# Patient Record
Sex: FEMALE | Race: WHITE | Employment: FULL TIME | URBAN - METROPOLITAN AREA
[De-identification: names, ages, dates, MRNs, and addresses within clinical notes are randomized per-mention and may not be internally consistent; named-entity substitution may affect disease eponyms.]

---

## 2017-02-02 PROBLEM — O99.112 THROMBOPHILIA AFFECTING PREGNANCY IN SECOND TRIMESTER, ANTEPARTUM (HCC): Status: ACTIVE | Noted: 2017-02-02

## 2017-02-02 PROBLEM — D68.59 THROMBOPHILIA AFFECTING PREGNANCY IN SECOND TRIMESTER, ANTEPARTUM (HCC): Status: ACTIVE | Noted: 2017-02-02

## 2017-03-14 PROBLEM — O12.13 PROTEINURIA AFFECTING PREGNANCY IN THIRD TRIMESTER: Status: ACTIVE | Noted: 2017-03-14

## 2017-03-28 PROBLEM — O36.8390 VARIABLE FETAL HEART RATE DECELERATIONS, ANTEPARTUM: Status: ACTIVE | Noted: 2017-03-28

## 2017-03-28 PROBLEM — O28.8 NON-REACTIVE NST (NON-STRESS TEST): Status: ACTIVE | Noted: 2017-03-28

## 2020-05-21 ENCOUNTER — INITIAL PRENATAL (OUTPATIENT)
Dept: OBGYN | Age: 30
End: 2020-05-21
Payer: COMMERCIAL

## 2020-05-21 ENCOUNTER — HOSPITAL ENCOUNTER (OUTPATIENT)
Age: 30
Discharge: HOME | End: 2020-05-23
Payer: COMMERCIAL

## 2020-05-21 VITALS
BODY MASS INDEX: 33.99 KG/M2 | HEART RATE: 121 BPM | DIASTOLIC BLOOD PRESSURE: 74 MMHG | WEIGHT: 198 LBS | SYSTOLIC BLOOD PRESSURE: 130 MMHG

## 2020-05-21 LAB
AMPHETAMINE SCREEN, URINE: NOT DETECTED
BARBITURATE SCREEN URINE: NOT DETECTED
BENZODIAZEPINE SCREEN, URINE: NOT DETECTED
CANNABINOID SCREEN URINE: NOT DETECTED
COCAINE METABOLITE SCREEN URINE: NOT DETECTED
CONTROL: NORMAL
FENTANYL SCREEN, URINE: NOT DETECTED
GLUCOSE TOLERANCE SCREEN 50G: 149 MG/DL (ref 70–140)
HCT VFR BLD CALC: 38.6 % (ref 34–48)
HEMOGLOBIN: 12.5 G/DL (ref 11.5–15.5)
Lab: NORMAL
MCH RBC QN AUTO: 28.3 PG (ref 26–35)
MCHC RBC AUTO-ENTMCNC: 32.4 % (ref 32–34.5)
MCV RBC AUTO: 87.5 FL (ref 80–99.9)
METHADONE SCREEN, URINE: NOT DETECTED
OPIATE SCREEN URINE: NOT DETECTED
OXYCODONE URINE: NOT DETECTED
PDW BLD-RTO: 13.1 FL (ref 11.5–15)
PHENCYCLIDINE SCREEN URINE: NOT DETECTED
PLATELET # BLD: 279 E9/L (ref 130–450)
PMV BLD AUTO: 10.3 FL (ref 7–12)
PREGNANCY TEST URINE, POC: POSITIVE
RBC # BLD: 4.41 E12/L (ref 3.5–5.5)
WBC # BLD: 6.2 E9/L (ref 4.5–11.5)

## 2020-05-21 PROCEDURE — 86850 RBC ANTIBODY SCREEN: CPT

## 2020-05-21 PROCEDURE — 86901 BLOOD TYPING SEROLOGIC RH(D): CPT

## 2020-05-21 PROCEDURE — 87088 URINE BACTERIA CULTURE: CPT

## 2020-05-21 PROCEDURE — 86592 SYPHILIS TEST NON-TREP QUAL: CPT

## 2020-05-21 PROCEDURE — 86787 VARICELLA-ZOSTER ANTIBODY: CPT

## 2020-05-21 PROCEDURE — 99203 OFFICE O/P NEW LOW 30 MIN: CPT | Performed by: OBSTETRICS & GYNECOLOGY

## 2020-05-21 PROCEDURE — 86803 HEPATITIS C AB TEST: CPT

## 2020-05-21 PROCEDURE — 85027 COMPLETE CBC AUTOMATED: CPT

## 2020-05-21 PROCEDURE — 87591 N.GONORRHOEAE DNA AMP PROB: CPT

## 2020-05-21 PROCEDURE — 87624 HPV HI-RISK TYP POOLED RSLT: CPT

## 2020-05-21 PROCEDURE — 83021 HEMOGLOBIN CHROMOTOGRAPHY: CPT

## 2020-05-21 PROCEDURE — 87340 HEPATITIS B SURFACE AG IA: CPT

## 2020-05-21 PROCEDURE — 36415 COLL VENOUS BLD VENIPUNCTURE: CPT

## 2020-05-21 PROCEDURE — 83020 HEMOGLOBIN ELECTROPHORESIS: CPT

## 2020-05-21 PROCEDURE — 80307 DRUG TEST PRSMV CHEM ANLYZR: CPT

## 2020-05-21 PROCEDURE — 82950 GLUCOSE TEST: CPT

## 2020-05-21 PROCEDURE — 86900 BLOOD TYPING SEROLOGIC ABO: CPT

## 2020-05-21 PROCEDURE — 86762 RUBELLA ANTIBODY: CPT

## 2020-05-21 PROCEDURE — 36415 COLL VENOUS BLD VENIPUNCTURE: CPT | Performed by: OBSTETRICS & GYNECOLOGY

## 2020-05-21 PROCEDURE — G0123 SCREEN CERV/VAG THIN LAYER: HCPCS

## 2020-05-21 PROCEDURE — 86703 HIV-1/HIV-2 1 RESULT ANTBDY: CPT

## 2020-05-21 PROCEDURE — 87491 CHLMYD TRACH DNA AMP PROBE: CPT

## 2020-05-21 PROCEDURE — 0500F INITIAL PRENATAL CARE VISIT: CPT | Performed by: OBSTETRICS & GYNECOLOGY

## 2020-05-21 PROCEDURE — 81025 URINE PREGNANCY TEST: CPT | Performed by: OBSTETRICS & GYNECOLOGY

## 2020-05-21 RX ORDER — ASPIRIN 81 MG/1
81 TABLET, CHEWABLE ORAL DAILY
COMMUNITY

## 2020-05-21 NOTE — PROGRESS NOTES
Mitzy Arboleda     Patient presents for new OB. LMP 3/26/20, placing her at 8 weeks. Patient with some nausea/ vomiting. Taking unisom, advised to add B6. Discussed foods, toxins, vaccines. Patient has a history of unexplained IUFD at 37 weeks with her first pregnancy. She went on to have a full term  after induction at [facility]. She then went on to have a fetus with cystic hygromas, [facility] showed Trisomy 13. (care was done at [facility]). Patient terminated and was told final karyotype was normal. She then had a MAB at 6weeks, followed by a chemical pregnancy. Patient would like cell free DNA. Referral to [facility] sent. Past Medical History:   Diagnosis Date    Bronchitis     Cholesteatoma     benign tumor behind ear    Pneumonia         Past Surgical History:   Procedure Laterality Date    MASTOIDECTOMY      TYMPANOSTOMY TUBE PLACEMENT          Family History   Problem Relation Age of Onset    Depression Maternal Grandmother     Cancer Maternal Grandmother 48        breast    Depression Mother     Cancer Mother         melanoma    Depression Maternal Aunt           Current Outpatient Medications:     aspirin 81 MG chewable tablet, Take 81 mg by mouth daily, Disp: , Rfl:     diphenhydrAMINE HCl, Sleep, (UNISOM SLEEPMELTS PO), Take by mouth, Disp: , Rfl:     Prenatal Vit-Fe Fumarate-FA (PRENATAL PO), Take by mouth, Disp: , Rfl:     ibuprofen (ADVIL;MOTRIN) 800 MG tablet, Take 1 tablet by mouth every 8 hours as needed for Pain (Patient not taking: Reported on 2020), Disp: 120 tablet, Rfl: 3    ibuprofen (IBU) 800 MG tablet, Take 1 tablet by mouth every 8 hours as needed for Pain for 7 days. , Disp: 21 tablet, Rfl: 0     No Known Allergies     Social History     Tobacco History     Smoking Status  Former Smoker Quit date  2014 Smoking Frequency  0.2 packs/day Smoking Tobacco Type  Cigarettes    Smokeless Tobacco Use  Never Used          Alcohol History     Alcohol Use Status  No

## 2020-05-21 NOTE — PROGRESS NOTES
New patient alert and pleasant with no complaints   Here today for initial prenatal visit with her  who sat in waiting area during visit. Pelvic exam, pap smear obtained, labeled Chiki Canales and hand delivered to lab. Urine for pregnancy obtained with positive results  Urine for culture obtained, labeled and sent to lab  Blood work obtained, labeled and sent to lab  Discharge instructions have been discussed with the patient. Patient advised to call our office with any questions or concerns. Voiced understanding.

## 2020-05-22 LAB
ABO/RH: NORMAL
ANTIBODY SCREEN: NORMAL
HEPATITIS B SURFACE ANTIGEN INTERPRETATION: NORMAL
HEPATITIS C ANTIBODY INTERPRETATION: NORMAL
HIV-1 AND HIV-2 ANTIBODIES: NORMAL
RPR: NORMAL

## 2020-05-23 LAB — URINE CULTURE, ROUTINE: NORMAL

## 2020-05-24 LAB
CHLAMYDIA BY THIN PREP: NEGATIVE
N. GONORRHOEAE DNA, THIN PREP: NEGATIVE
SOURCE: NORMAL

## 2020-05-26 ENCOUNTER — TELEPHONE (OUTPATIENT)
Dept: OBGYN | Age: 30
End: 2020-05-26

## 2020-05-26 LAB
RUBELLA ANTIBODY IGG: NORMAL
VARICELLA-ZOSTER VIRUS AB, IGG: NORMAL

## 2020-05-28 ENCOUNTER — HOSPITAL ENCOUNTER (OUTPATIENT)
Dept: ULTRASOUND IMAGING | Age: 30
Discharge: HOME | End: 2020-05-28
Payer: COMMERCIAL

## 2020-05-28 PROCEDURE — 76801 OB US < 14 WKS SINGLE FETUS: CPT

## 2020-06-01 LAB
Lab: NORMAL
REPORT: NORMAL
THIS TEST SENT TO: NORMAL

## 2020-06-02 LAB
HPV SAMPLE: NORMAL
HPV TYPE 16: NOT DETECTED
HPV TYPE 18: NOT DETECTED
HPV, HIGH RISK OTHER: NOT DETECTED
INTERPRETATION: NORMAL
SOURCE: NORMAL

## 2020-06-04 ENCOUNTER — HOSPITAL ENCOUNTER (OUTPATIENT)
Age: 30
Discharge: HOME | End: 2020-06-04
Payer: COMMERCIAL

## 2020-06-04 LAB
GLUCOSE TOLERANCE TEST 1 HOUR: 178 MG/DL
GLUCOSE TOLERANCE TEST 2 HOUR: 138 MG/DL
GLUCOSE TOLERANCE TEST FASTING: 103 MG/DL

## 2020-06-04 PROCEDURE — 36415 COLL VENOUS BLD VENIPUNCTURE: CPT

## 2020-06-04 PROCEDURE — 82951 GLUCOSE TOLERANCE TEST (GTT): CPT

## 2020-06-18 ENCOUNTER — ROUTINE PRENATAL (OUTPATIENT)
Dept: OBGYN CLINIC | Age: 30
End: 2020-06-18
Payer: COMMERCIAL

## 2020-06-18 ENCOUNTER — ROUTINE PRENATAL (OUTPATIENT)
Dept: OBGYN | Age: 30
End: 2020-06-18
Payer: COMMERCIAL

## 2020-06-18 VITALS
BODY MASS INDEX: 33.46 KG/M2 | HEART RATE: 87 BPM | SYSTOLIC BLOOD PRESSURE: 112 MMHG | HEIGHT: 64 IN | DIASTOLIC BLOOD PRESSURE: 74 MMHG | TEMPERATURE: 97.5 F | WEIGHT: 196 LBS

## 2020-06-18 VITALS
WEIGHT: 197 LBS | SYSTOLIC BLOOD PRESSURE: 122 MMHG | DIASTOLIC BLOOD PRESSURE: 83 MMHG | BODY MASS INDEX: 33.81 KG/M2 | HEART RATE: 101 BPM

## 2020-06-18 PROBLEM — O24.419 GESTATIONAL DIABETES MELLITUS (GDM) AFFECTING PREGNANCY, ANTEPARTUM: Status: ACTIVE | Noted: 2020-06-18

## 2020-06-18 PROBLEM — O26.21 RECURRENT PREGNANCY LOSS IN PREGNANT PATIENT IN FIRST TRIMESTER, ANTEPARTUM: Status: ACTIVE | Noted: 2020-06-18

## 2020-06-18 LAB
GLUCOSE URINE, POC: NEGATIVE
GLUCOSE URINE, POC: NORMAL
PROTEIN UA: NEGATIVE
PROTEIN UA: NEGATIVE

## 2020-06-18 PROCEDURE — 81002 URINALYSIS NONAUTO W/O SCOPE: CPT | Performed by: OBSTETRICS & GYNECOLOGY

## 2020-06-18 PROCEDURE — 36415 COLL VENOUS BLD VENIPUNCTURE: CPT | Performed by: OBSTETRICS & GYNECOLOGY

## 2020-06-18 PROCEDURE — 76801 OB US < 14 WKS SINGLE FETUS: CPT | Performed by: OBSTETRICS & GYNECOLOGY

## 2020-06-18 PROCEDURE — 99213 OFFICE O/P EST LOW 20 MIN: CPT | Performed by: OBSTETRICS & GYNECOLOGY

## 2020-06-18 PROCEDURE — 99203 OFFICE O/P NEW LOW 30 MIN: CPT | Performed by: OBSTETRICS & GYNECOLOGY

## 2020-06-18 PROCEDURE — 0502F SUBSEQUENT PRENATAL CARE: CPT | Performed by: OBSTETRICS & GYNECOLOGY

## 2020-06-18 RX ORDER — GLUCOSAMINE HCL/CHONDROITIN SU 500-400 MG
CAPSULE ORAL
Qty: 100 STRIP | Refills: 3 | Status: ON HOLD
Start: 2020-06-18 | End: 2020-12-19 | Stop reason: HOSPADM

## 2020-06-18 RX ORDER — LANOLIN ALCOHOL/MO/W.PET/CERES
50 CREAM (GRAM) TOPICAL DAILY
Status: ON HOLD | COMMUNITY
End: 2020-12-19 | Stop reason: HOSPADM

## 2020-06-18 RX ORDER — LANCETS 30 GAUGE
EACH MISCELLANEOUS
Qty: 100 EACH | Refills: 3 | Status: ON HOLD
Start: 2020-06-18 | End: 2020-12-19 | Stop reason: HOSPADM

## 2020-06-18 RX ORDER — BLOOD-GLUCOSE METER
KIT MISCELLANEOUS
Qty: 1 KIT | Refills: 0 | Status: ON HOLD
Start: 2020-06-18 | End: 2020-12-19 | Stop reason: HOSPADM

## 2020-06-18 NOTE — PATIENT INSTRUCTIONS
Please arrive for your scheduled appointment at least 15 minutes early with your actual insurance card+ a photo ID. Also if you need any refills ordered or have questions, it may take up 48 hours to reply. Please allow ample time for your refills. Call me when you use last refill. Thank you for your cooperation. Any questions contact Julisa at [phone]. If you are experiencing an emergency and need immediate help, call 911 or go to go emergency room or labor and delivery. if you are sick, not feeling well or have an infectious process going on please reschedule your appointment by calling [phone]. Also if any family members are not feeling well, please do not bring them to your appointment. We appreciate your cooperation. We are doing this in order to protect our pregnant mothers+ their babies. Call your primary obstetrician with bleeding, leaking of fluid, abdominal tenderness, headache, blurry vision, epigastric pain and increased urinary frequency. Patient Education        Weeks 10 to 14 of Your Pregnancy: Care Instructions  Your Care Instructions     By weeks 10 to 14 of your pregnancy, the placenta has formed inside your uterus. It is possible to hear your baby's heartbeat with a special ultrasound device. Your baby's eyes can and do move. The arms and legs can bend. This is a good time to think about testing for birth defects. There are two types of tests: screening and diagnostic. Screening tests show the chance that a baby has a certain birth defect. They can't tell you for sure that your baby has a problem. Diagnostic tests show if a baby has a certain birth defect. It's your choice whether to have these tests. You and your partner can talk to your doctor or midwife about birth defects tests. Follow-up care is a key part of your treatment and safety. Be sure to make and go to all appointments, and call your doctor if you are having problems.  It's also a good idea to know your test results and about what might be a problem during pregnancy. Although most pregnant women don't have any serious problems, it's important to know when to call your doctor if you have certain symptoms. These are general suggestions. Your doctor may give you some more information about when to call. When to call your doctor (up to 20 weeks)  [id number] anytime you think you may need emergency care. For example, call if:  · You passed out (lost consciousness). Call your doctor now or seek immediate medical care if:  · You have a fever. · You have vaginal bleeding. · You are dizzy or lightheaded, or you feel like you may faint. · You have symptoms of a urinary tract infection. These may include:  ? Pain or burning when you urinate. ? A frequent need to urinate without being able to pass much urine. ? Pain in the flank, which is just below the rib cage and above the waist on either side of the back. ? Blood in your urine. · You have belly pain. · You think you are having contractions. · You have a sudden release of fluid from your vagina. Watch closely for changes in your health, and be sure to contact your doctor if:  · You have vaginal discharge that smells bad. · You have other concerns about your pregnancy. Follow-up care is a key part of your treatment and safety. Be sure to make and go to all appointments, and call your doctor if you are having problems. It's also a good idea to know your test results and keep a list of the medicines you take. Where can you learn more? Go to [email or web address] org and sign in to your [employer] account. Enter L759 in the [facility] box to learn more about \"Learning About When to Call Your Doctor During Pregnancy (Up to 20 Weeks). \"     If you do not have an account, please click on the \"Sign Up Now\" link. Current as of: February 11, 2020               Content Version: 12.5  © 6378-8628 Healthwise, Incorporated.    Care instructions adapted under license other site, but make sure your hands are dry before the test.  · Insert a clean lancet into the lancet device. · Remove a test strip from the test strip bottle. Replace the lid right away to keep moisture away from the other strips. · Follow the instructions that came with your meter to get it ready. · Use the lancet device to stick the side of your fingertip with the lancet. Do not stick the tip of your finger. Some blood sugar meters use lancet devices that take the blood sample from other sites, such as the palm of the hand or the forearm. But the finger is usually the most accurate place to test blood sugar. · Put a drop of blood on the correct spot on the test strip. · Apply pressure with a clean cotton ball to stop the bleeding. · Follow the directions that came with the meter to get the results. · Write down the results and the time that you tested your blood. Some meters will store the results for you. How long does the test take? The blood glucose meter will show the results of the test in a minute or less. What are the possible results for the test?  The American Diabetes Association (ADA) recommends that you stay within the following blood glucose level ranges. But depending on your health, you and your doctor may set a different range for you. For nonpregnant adults with diabetes  · 80 milligrams per deciliter (mg/dL) to 130 mg/dL before a meal  · Less than 180 mg/dL 1 to 2 hours after a meal  For women who have diabetes related to pregnancy (gestational diabetes)  · 95 mg/dL or less before breakfast  · 120 to 140 mg/dL (or lower) 1 to 2 hours after a meal  Where can you learn more? Go to [email or web address] [email or web address] org and sign in to your [employer] account. Enter S609 in the [employer] box to learn more about \"Home Blood Glucose Test: About This Test.\"     If you do not have an account, please click on the \"Sign Up Now\" link.   Current as of: December 20, blood sugar target range before a meal is ___________________. Your blood sugar target range after a meal is _______________________. · Do this--___________________________________________________--to get your blood sugar back within your safe range if your blood sugar results are _________________________________________. (For example: Less than 70 or above 250 mg/dL.)  Call your doctor when your blood sugar results are ___________________________________. (For example: Less than 70 or above 250 mg/dL.)  What are the symptoms of low and high blood sugar? Common symptoms of low blood sugar are sweating and feeling shaky, weak, hungry, or confused. Symptoms can start quickly. Common symptoms of high blood sugar are feeling very thirsty or very hungry. You may also pass urine more often than usual. You may have blurry vision and may lose weight without trying. But some people may have high or low blood sugar without having any symptoms. That's a good reason to check your blood sugar on a regular schedule. What should you do if you have symptoms? Work with your doctor to fill in the blank spaces below that apply to you. Low blood sugar  If you have symptoms of low blood sugar, check your blood sugar. If it's below _____ ( for example, below 70), eat or drink a quick-sugar food that has about 15 grams of carbohydrate. Your goal is to get your level back to your safe range. Check your blood sugar again 15 minutes later. If it's still not in your target range, take another 15 grams of carbohydrate and check your blood sugar again in 15 minutes. Repeat this until you reach your target. Then go back to your regular testing schedule. Children usually need less than 15 grams of carbohydrate. Check with your doctor or diabetes educator for the amount that is right for your child.   When you have low blood sugar, it's best to stop or reduce any physical activity until your blood sugar is back in your target range and

## 2020-06-18 NOTE — LETTER
20    MD Trista [address], [address]     RE:  Bernadine Saldana  : 1990   AGE: 26 y.o. This report has been created using voice recognition software. It may contain errors which are inherent in voice recognition technology. Dear Dr. 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 female, who is G6(2,0,3,1). She has an Estimated Date of Delivery: 20 based on her LMP and previous ultrasound assessment. She is currently 12 weeks 0 days gestation based on that assessment. The patient had a 2-hour glucose tolerance test performed on 2020. For fasting blood sugar was 103 which is elevated. She had no prior history of diabetes. She has not yet started to monitor her blood sugars. She was advised not to consume foods containing free sugars, including but not limited too; candy, cake, pie cookies, pop and adding sugars to her foods and beverages. I advised the patient that optimizing her blood sugar management during her pregnancy can help to reduce her risk of  morbidity and mortality. I advised her to increase her activity after eating in order to her to help to normalize her post prandial blood sugars. I advised the patient that her fasting blood sugars should be less than 92. Her post prandial blood sugars should be less than 120. She is to call if her blood sugars are outside of these parameters. She is to fax her blood sugars to my office on a weekly basis. Neural Tube Defect NO   Blu-Sachs NO   Sickle Cell Disease NO   Sickle Cell Trait NO   Sickle C Disease or Trait NO   Hemophilia NO   Muscular Dystrophy NO   Cystic Fibrosis NO   Cr Disease NO   Autism NO   Mental Retardation NO   History of Fragile X NO   Maternal Diabetes NO   Other Genetic Disease or Syndrome NO   Previous Child With Congenital Abnormality Not Listed NO   Recreational Drugs NO                                               INFECTION HISTORY     HEPATITIS IMMUNIZED:  YES   HEPATITIS INFECTION:  NO   EXPOSURE TO TB NO   GENITAL HERPES    NO   PARVOVIRUS B-19 NO   CHICKEN POX  YES   MEASLES NO   STD NO   HIV NO   OTHER RASH OR VIRAL ILLNESS SINCE LMP NO   UTI RECURRENT NO   HPV NO     OB History    Para Term  AB Living   6 2 2   3 1   SAB TAB Ectopic Molar Multiple Live Births   2 1       1      # Outcome Date GA Lbr Herson/2nd Weight Sex Delivery Anes PTL Lv   6 Current            5 TAB 2019           4  2019           3  2019           2 Term 17 37w2d  6 lb 9 oz (2.977 kg) F Vag-Spont EPI N DARIUS   1 Term 16 38w5d  6 lb 10 oz (3.005 kg) F Vag-Spont EPI N FD     PAST GYNECOLOGICAL  HISTORY:  Positive for abnormal pap smears. Doesn't know the grade  Negative for cervical LEEP / conization /cryosurgery. Negative for uterine surgery. Negative for ovarian or tubal surgery.      Past Medical History:   Diagnosis Date    Abnormal Pap smear of cervix     will repeat after delivery    Gestational diabetes mellitus (GDM) affecting pregnancy, antepartum 2020       Past Surgical History:   Procedure Laterality Date    DILATION AND CURETTAGE  2019    X 2    MASTOIDECTOMY      TYMPANOSTOMY TUBE PLACEMENT         No Known Allergies    Current Outpatient Medications:     vitamin B-6 (PYRIDOXINE) 50 MG tablet, Take 50 mg by mouth daily    aspirin 81 MG chewable tablet, Take 81 mg by mouth daily 3.  History of a prior stillbirth at 37 weeks 5 days gestational age  3. History of 2 spontaneous first trimester pregnancy losses  5. 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 6.  First trimester NT measurement was at the 65th growth percentile  7. First trimester nasal bone visualization  8. Requesting NIPT understanding the limitations of the testing     PLAN:  The patient has elected to have a [place] screen. She is to call for the results in 2 weeks if she does not receive the information prior to that time. I advised the patient to do home glucose monitoring. She is to check her blood sugars fasting and 2 hours after each meal. Her fasting blood sugars should be less than 92 mg/dl and her 2 hour post prandial blood sugars less than 120 mg/dl. She is to call if her blood sugars are outside of these parameters. The patient was advised to call if she has any increased vaginal discharge, vaginal bleeding, contractions, abdominal pain, back pain or any new significant symptomatology prior to her next visit. I advised her that these are signs and symptoms of cervical change and require follow-up assessment when they occur. I requested the patient return for a follow-up assessment in 4 weeks unless there is a clinical reason for her to return prior to that time. She is to call if she has any problems or questions prior to her next visit. Further evaluation and management will be dependent on her clinical presentation and the results of her testing. The patient is to continue to follow with you in your office for ongoing obstetric care. I spent 31 minutes of direct contact time with the patient of which greater than 50% of the time was to discuss complications and problems related to her pregnancy.   I discussed the results of the 2-hour glucose tolerance test and my recommendations for management of her blood sugars during pregnancy. The frequency of testing will be dependent on the results of her initial evaluation. I reviewed with the patient noninvasive  testing discussing the limitations of the testing compared to diagnostic genetic testing. The patient understands that noninvasive  testing does not replace a diagnostic test.  We reviewed the results of her ultrasound assessment performed today. A complete medical, genetic, family and obstetric history was obtained. I discussed my recommendations for ongoing evaluation and management of her pregnancy. I answered all of her questions to her satisfaction. I asked her to call if she had any additional questions prior to her next visit. If you have any questions regarding her management, please contact me at your convenience and thank you for allowing me to participate in her care.     Sincerely,        Phu Pierce MD, MS, Jessica Najera, SHEREECS, RDMS, RVT  Director [address] [place] [address]  [phone]

## 2020-06-24 ENCOUNTER — TELEPHONE (OUTPATIENT)
Dept: OBGYN CLINIC | Age: 30
End: 2020-06-24

## 2020-06-25 ENCOUNTER — TELEPHONE (OUTPATIENT)
Dept: OBGYN CLINIC | Age: 30
End: 2020-06-25

## 2020-07-06 ENCOUNTER — TELEPHONE (OUTPATIENT)
Dept: OBGYN CLINIC | Age: 30
End: 2020-07-06

## 2020-07-16 ENCOUNTER — ROUTINE PRENATAL (OUTPATIENT)
Dept: OBGYN | Age: 30
End: 2020-07-16
Payer: COMMERCIAL

## 2020-07-16 VITALS
DIASTOLIC BLOOD PRESSURE: 75 MMHG | HEART RATE: 92 BPM | BODY MASS INDEX: 33.47 KG/M2 | WEIGHT: 195 LBS | SYSTOLIC BLOOD PRESSURE: 122 MMHG

## 2020-07-16 LAB
GLUCOSE URINE, POC: NORMAL
PROTEIN UA: NEGATIVE

## 2020-07-16 PROCEDURE — 99213 OFFICE O/P EST LOW 20 MIN: CPT | Performed by: OBSTETRICS & GYNECOLOGY

## 2020-07-16 PROCEDURE — 81002 URINALYSIS NONAUTO W/O SCOPE: CPT | Performed by: OBSTETRICS & GYNECOLOGY

## 2020-07-16 PROCEDURE — 0502F SUBSEQUENT PRENATAL CARE: CPT | Performed by: OBSTETRICS & GYNECOLOGY

## 2020-07-16 NOTE — PROGRESS NOTES
Patient alert and pleasant with no complaints  Here today for prenatal visit  Urine for glucose obtained with trace results  Urine for protein obtained with negative results  Fetal heart tones obtained without difficulty  Discharge instructions have been discussed with the patient. Patient advised to call our office with any questions or concerns. Voiced understanding.

## 2020-07-16 NOTE — PROGRESS NOTES
Chaz Moctezuma    Patient present for routine prenatal visit today at 16w0d. Reviewed genetic testing, low probability of trisomy, female gender. Saw [facility] 6/18/20. Patient is GDM, checking FS. Fastings have been elevated. Did not do finger stick this morning as she ate at 4:30am. Next appointment is next Thursday, 7/23/20. She has faxed her finger stick log to [facility]. Denies complaints  Denies VB, or cramping  Feeling good movement at this time. Reviewed above. Fetal Heart Rate: 155    All questions and concerns addressed at this time    Carl Starks was seen today for routine prenatal visit. 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     Robina Leyden, MD

## 2020-07-23 ENCOUNTER — ROUTINE PRENATAL (OUTPATIENT)
Dept: OBGYN CLINIC | Age: 30
End: 2020-07-23
Payer: COMMERCIAL

## 2020-07-23 VITALS
BODY MASS INDEX: 33.3 KG/M2 | WEIGHT: 194 LBS | HEART RATE: 83 BPM | DIASTOLIC BLOOD PRESSURE: 74 MMHG | TEMPERATURE: 97.8 F | SYSTOLIC BLOOD PRESSURE: 105 MMHG

## 2020-07-23 PROBLEM — O44.42 LOW LYING PLACENTA NOS OR WITHOUT HEMORRHAGE, SECOND TRIMESTER: Status: ACTIVE | Noted: 2020-07-23

## 2020-07-23 PROBLEM — O26.22 RECURRENT PREGNANCY LOSS IN PREGNANT PATIENT IN SECOND TRIMESTER, ANTEPARTUM: Status: ACTIVE | Noted: 2020-06-18

## 2020-07-23 PROCEDURE — 81002 URINALYSIS NONAUTO W/O SCOPE: CPT | Performed by: OBSTETRICS & GYNECOLOGY

## 2020-07-23 PROCEDURE — 76811 OB US DETAILED SNGL FETUS: CPT | Performed by: OBSTETRICS & GYNECOLOGY

## 2020-07-23 PROCEDURE — 99214 OFFICE O/P EST MOD 30 MIN: CPT | Performed by: OBSTETRICS & GYNECOLOGY

## 2020-07-23 PROCEDURE — 76817 TRANSVAGINAL US OBSTETRIC: CPT | Performed by: OBSTETRICS & GYNECOLOGY

## 2020-07-23 PROCEDURE — 99213 OFFICE O/P EST LOW 20 MIN: CPT | Performed by: OBSTETRICS & GYNECOLOGY

## 2020-07-23 NOTE — LETTER
20    MD Tr[address], [address]                RE:  Carlos Geiger  : 1990   AGE: 26 y.o.     This report has been created using voice recognition software. It may contain errors which are inherent in voice recognition technology.     Dear Dr. 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 female, who is G6(2,0,3,1). She has an Estimated Date of Delivery: 20 based on her LMP and previous ultrasound assessment. She is currently 17 weeks 0 days gestation based on that assessment. The patient denies having any new problems since her last visit. Her blood sugar control has generally been good, with intermittent elevations related to deviations from her carbohydrate controlled diet. She currently managed her as her gestational diabetes with dietary control.     The patient had a 2-hour glucose tolerance test performed on 2020. Her fasting blood sugar was 103 which is elevated. She had no prior history of diabetes.       I previously advised the patient that she has an increased risk for developing diabetes in her lifetime secondary to having gestational diabetes. This risk has been reported to be approximately 60%. Weight loss, exercise and appropriate dietary intake following delivery can be of help in modifying this risk. If the patient's blood sugars normalize without treatment following delivery a GTT should be performed at 6 weeks post partum.  She should be referred to her primary care physician for chromosomal changes in the mother. The test is validated for single and twin pregnancies with a gestational age of at least 9 weeks. Chromosomal mosaicism cannot be distinguished, and in some cases, not detected by this method. A negative test does not eliminate the possibility of fetal chromosome abnormalities in regions tested or and other areas of the genome. The tests cannot rule out other genetic diseases or birth defects, including open neural tube defects.     A fetal ultrasound evaluation was performed today and a detailed report included under the media tab from today's date. A living bae intrauterine fetus was identified in the breech presentation, with normal fetal heart motion and normal fetal motion noted. The placenta was on the posterior and right lateral aspects of the uterus. The amniotic fluid volume was within normal limits. The biometric measurements were consistent with the patient's established dating parameters, within the limits of error the test at the current gestational age. Visualization of the fetal anatomy was somewhat limited secondary to her early gestational age. No apparent gross fetal anatomic abnormalities were identified in the areas which were visualized.     The placenta was noted to be low-lying.                             GENETIC SCREENING/TERATOLOGY COUNSELING                          (Includes patient, FTB, and any affected family members)     Patient Age > 35 Years NO   Thalassemia ( MVC<80) NO   Congential Heart Defect NO   Neural Tube Defect NO   Blu-Sachs NO   Sickle Cell Disease NO   Sickle Cell Trait NO   Sickle C Disease or Trait NO   Hemophilia NO   Muscular Dystrophy NO   Cystic Fibrosis NO   [place] Disease NO   Autism NO   Mental Retardation NO   History of Fragile X NO   Maternal Diabetes NO   Other Genetic Disease or Syndrome NO   Previous Child With Congenital Abnormality Not Listed NO   Recreational Drugs NO     INFECTION HISTORY          HEPATITIS IMMUNIZED:  YES   HEPATITIS INFECTION:  NO   EXPOSURE TO TB NO   GENITAL HERPES    NO   PARVOVIRUS B-19 NO   CHICKEN POX  YES   MEASLES NO   STD NO   HIV NO   OTHER RASH OR VIRAL ILLNESS SINCE LMP NO   UTI RECURRENT NO   HPV NO      OB History    Para Term  AB Living   6 2 2   3 1   SAB TAB Ectopic Molar Multiple Live Births    2 1       1       # Outcome Date GA Lbr Herson/2nd Weight Sex Delivery Anes PTL Lv   6 Current                     5 TAB 2019                   4 2019                   3 2019                   2 Term 17 37w2d   6 lb 9 oz (2.977 kg) F Vag-Spont EPI N DARIUS   1 Term 16 38w5d   6 lb 10 oz (3.005 kg) F Vag-Spont EPI N FD      PAST GYNECOLOGICAL  HISTORY:  Positive for abnormal pap smears. Doesn't know the grade  Negative for cervical LEEP / conization /cryosurgery. Negative for uterine surgery. Negative for ovarian or tubal surgery.      Past Medical History:   Diagnosis Date    Abnormal Pap smear of cervix      will repeat after delivery    Gestational diabetes mellitus (GDM) affecting pregnancy, antepartum 2020         Past Surgical History:   Procedure Laterality Date    DILATION AND CURETTAGE        X 2    MASTOIDECTOMY        TYMPANOSTOMY TUBE PLACEMENT          No Known Allergies     Current Outpatient Medications:     vitamin B-6 (PYRIDOXINE) 50 MG tablet, Take 50 mg by mouth daily    aspirin 81 MG chewable tablet, Take 81 mg by mouth daily     diphenhydrAMINE HCl, Sleep, (UNISOM SLEEPMELTS PO), Take by mouth    Prenatal Vit-Fe Fumarate-FA (PRENATAL PO), Take by mouth     Social History      Tobacco Use    Smoking status: Former Smoker       Packs/day: 0.20       Types: Cigarettes       Last attempt to quit: 2014       Years since quittin.4    Smokeless tobacco: Never Used   Substance Use Topics    Alcohol use:  No      FAMILY MEDICAL HISTORY: Negative for congenital abnormalities, autism, genetic disease and mental retardation, not listed above.      Review of Systems :   CONSTITUTIONAL : No fever, no chills   HEENT : No headache, no visual changes, no rhinorrhea, no sore throat   CARDIOVASCULAR : No pain, no palpitations, no edema   RESPIRATORY : No pain, no shortness of breath   GASTROINTESTINAL : No N/V, no D/C, no abdominal pain   GENITOURINARY : No dysuria, hematuria and no incontinence   MUSCULOSKELETAL : No myalgia, No back pain  NEUROLOGICAL : No numbness, no tingling, no tremors. No history of seizures  ALL OTHER SYSTEMS WERE REPORTED AS NEGATIVE.     PERTINENT PHYSICAL EXAMINATION:   /74   Pulse 83   Temp 97.8 °F (36.6 °C)   Wt 194 lb (88 kg)   LMP 03/26/2020   BMI 33.30 kg/m²   Urine dipstick:   Negative for Glucose    Negative for Albumin     GENERAL:   The patient is a well developed, female who is alert cooperative and oriented times three in no acute distress.     HEENT:  Normo cephalic and atraumatic. No facial edema.      ABDOMEN:   Her uterus is gravid. She had no complaint of abdominal pain or tenderness. The fetal heart rate is 150 bpm.      EXTREMITIES:  No peripheral edema is noted.      A fetal ultrasound assessment was performed today. A report is enclosed for your review.     IMPRESSION:  1.  IUP at 17 weeks 0 days gestation based on her Estimated Date of Delivery: 12/31/20  2. Gestational diabetes versus type 2 diabetes. 3.  History of a prior stillbirth at 37 weeks 5 days gestational age  3. History of 2 spontaneous first trimester pregnancy losses  5. 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 6.  First trimester NT measurement was at the 65th growth percentile  7. First trimester nasal bone visualization  8. [place] screen showed no increased risk for fetal aneuploidy for the chromosomes assessed.   9.  Low-lying placenta July 23, 2020     PLAN: your convenience and thank you for allowing me to participate in her care.     Sincerely,           Ericka Meyers MD, [address], Stacie Jeter, [facility], [address], [facility]  Director [address] [place] [address]  [phone]

## 2020-07-23 NOTE — PATIENT INSTRUCTIONS
Please arrive for your scheduled appointment at least 15 minutes early with your actual insurance card+ a photo ID. Also if you need any refills ordered or have questions, it may take up 48 hours to reply. Please allow ample time for your refills. Call me when you use last refill. Thank you for your cooperation. Any questions contact Julisa at [phone]. If you are experiencing an emergency and need immediate help, call 911 or go to go emergency room or labor and delivery. if you are sick, not feeling well or have an infectious process going on please reschedule your appointment by calling [phone]. Also if any family members are not feeling well, please do not bring them to your appointment. We appreciate your cooperation. We are doing this in order to protect our pregnant mothers+ their babies. Call your primary obstetrician with bleeding, leaking of fluid, abdominal tenderness, headache, blurry vision, epigastric pain and increased urinary frequency. Patient Education        Weeks 14 to 25 of Your Pregnancy: Care Instructions  Your Care Instructions     During this time, you may start to \"show,\" so that you look pregnant to people around you. You may also notice some changes in your skin, such as itchy spots on your palms or acne on your face. Your baby is now able to pass urine, and your baby's first stool (meconium) is starting to collect in his or her intestines. Hair is also beginning to grow on your baby's head. At your next visit, between weeks 18 and 20, your doctor may do an ultrasound test. The test allows your doctor to check for certain problems. Your doctor can also tell the sex of your baby. This is a good time to think about whether you want to know whether your baby is a boy or a girl. Talk to your doctor about getting a flu shot to help keep you healthy during your pregnancy. As your pregnancy moves along, it is common to worry or feel anxious. Your body is changing a lot.  And you are thinking about giving birth, the health of your baby, and becoming a parent. You can learn to cope with any anxiety and stress you feel. Follow-up care is a key part of your treatment and safety. Be sure to make and go to all appointments, and call your doctor if you are having problems. It's also a good idea to know your test results and keep a list of the medicines you take. How can you care for yourself at home? Reduce stress  · Ask for help with cooking and housekeeping. · Figure out who or what causes your stress. Avoid these people or situations as much as possible. · Relax every day. Taking 10- to 15-minute breaks can make a big difference. Take a walk, listen to music, or take a warm bath. · Learn relaxation techniques at prenatal or yoga class. Or buy a relaxation tape. · List your fears about having a baby and becoming a parent. Share the list with someone you trust. Decide which worries are really small, and try to let them go. Exercise  · If you did not exercise much before pregnancy, start slowly. Walking is best. Laurey Clam yourself, and do a little more every day. · Brisk walking, easy jogging, low-impact aerobics, water aerobics, and yoga are good choices. Some sports, such as scuba diving, horseback riding, downhill skiing, gymnastics, and water skiing, are not a good idea. · Try to do at least 2½ hours a week of moderate exercise, such as a fast walk. One way to do this is to be active 30 minutes a day, at least 5 days a week. It's fine to be active in blocks of 10 minutes or more throughout your day and week. · Wear loose clothing. And wear shoes and a bra that provide good support. · Warm up and cool down to start and finish your exercise. · If you want to use weights, be sure to use light weights. They reduce stress on your joints. Stay at the best weight for you  · Experts recommend that you gain about 1 pound a month during the first 3 months of your pregnancy.   · Experts recommend This care sheet will teach you about the signs of high and low blood sugar. It will help you make an action plan with your doctor for when these signs occur. Low blood sugar is more likely to happen if you take certain medicines for diabetes. It can also happen if you skip a meal, drink alcohol, or exercise more than usual.  You may get high blood sugar if you eat differently than you normally do. One example is eating more carbohydrate than usual. Having a cold, the flu, or other sudden illness can also cause high blood sugar levels. Levels can also rise if you miss a dose of medicine. Any change in how you take your medicine may affect your blood sugar level. So it's important to work with your doctor before you make any changes. Check your blood sugar  Work with your doctor to fill in the blank spaces below that apply to you. Track your levels, know your target range, and write down ways you can get your blood sugar back in your target range. A log book can help you track your levels. Take the book to all of your medical appointments. · Check your blood sugar _____ times a day, at these times:________________________________________________. (For example: Before meals, at bedtime, before exercise, during exercise, other.)  · Your blood sugar target range before a meal is ___________________. Your blood sugar target range after a meal is _______________________. · Do this--___________________________________________________--to get your blood sugar back within your safe range if your blood sugar results are _________________________________________. (For example: Less than 70 or above 250 mg/dL.)  Call your doctor when your blood sugar results are ___________________________________. (For example: Less than 70 or above 250 mg/dL.)  What are the symptoms of low and high blood sugar? Common symptoms of low blood sugar are sweating and feeling shaky, weak, hungry, or confused.  Symptoms can start

## 2020-07-24 LAB
GLUCOSE URINE, POC: NEGATIVE
PROTEIN UA: NEGATIVE

## 2020-07-31 ENCOUNTER — TELEPHONE (OUTPATIENT)
Dept: OBGYN | Age: 30
End: 2020-07-31

## 2020-07-31 NOTE — TELEPHONE ENCOUNTER
I reached out to patient by way of text to enroll her into [employer]. Patient declined at this time due to not being interested in the [employer] or [employer] for herself and baby.  I thanked patient and will remove her from our [employer].

## 2020-08-13 ENCOUNTER — ROUTINE PRENATAL (OUTPATIENT)
Dept: OBGYN CLINIC | Age: 30
End: 2020-08-13
Payer: COMMERCIAL

## 2020-08-13 VITALS
TEMPERATURE: 97.5 F | DIASTOLIC BLOOD PRESSURE: 78 MMHG | SYSTOLIC BLOOD PRESSURE: 111 MMHG | WEIGHT: 195 LBS | HEART RATE: 109 BPM | BODY MASS INDEX: 33.47 KG/M2

## 2020-08-13 LAB
GLUCOSE URINE, POC: NEGATIVE
PROTEIN UA: NEGATIVE

## 2020-08-13 PROCEDURE — 81002 URINALYSIS NONAUTO W/O SCOPE: CPT | Performed by: OBSTETRICS & GYNECOLOGY

## 2020-08-13 PROCEDURE — 99213 OFFICE O/P EST LOW 20 MIN: CPT | Performed by: OBSTETRICS & GYNECOLOGY

## 2020-08-13 PROCEDURE — 99213 OFFICE O/P EST LOW 20 MIN: CPT

## 2020-08-13 PROCEDURE — 76817 TRANSVAGINAL US OBSTETRIC: CPT | Performed by: OBSTETRICS & GYNECOLOGY

## 2020-08-13 PROCEDURE — 76811 OB US DETAILED SNGL FETUS: CPT | Performed by: OBSTETRICS & GYNECOLOGY

## 2020-08-13 NOTE — LETTER
20    Sendy Louis, [address] [facility]  [place], [address]                RE: Teodoro Ivory  : 1990   AGE: 26 y.o.     This report has been created using voice recognition software. It may contain errors which are inherent in voice recognition technology.     Dear Dr. Lizet Crowley:  Milton Selvin an appointment today for the following indications:     Patient Active Problem List   Diagnosis    History of stillbirth in currently pregnant patient    [facility]ozygous MTHFR mutation [id number] ([facility]    Thrombophilia affecting pregnancy in second trimester, antepartum ([facility]    Recurrent pregnancy loss in pregnant patient in first trimester, antepartum    Gestational diabetes mellitus (GDM) affecting pregnancy, antepartum      Joe Lopez is a 26 y. o. female, who is G6(2,0,3,1). She has an Estimated Date of Delivery: 20 based on her LMP and previous ultrasound assessment. Rick Leroy is currently 20 weeks 0 days gestation based on that assessment.      The patient denies having any new problems since her last visit. Her blood sugar control has generally been good, with intermittent elevations related to deviations from her carbohydrate controlled diet. She currently managed her as her gestational diabetes with dietary control.     The patient had a 2-hour glucose tolerance test performed on 2020.  Her fasting blood sugar was 103 which is elevated.  She had no prior history of diabetes.       I previously advised the patient that she has an increased risk for developing diabetes in her lifetime secondary to having gestational diabetes. This risk has been reported to be approximately 60%. Weight loss, exercise and appropriate dietary intake following delivery can be of help in modifying this risk. If the patient's blood sugars normalize without treatment following delivery a GTT should be performed at 6 weeks post partum.  She should be referred to her primary care physician for chromosomal changes in the mother. The test is validated for single and twin pregnancies with a gestational age of at least 9 weeks. Chromosomal mosaicism cannot be distinguished, and in some cases, not detected by this method. A negative test does not eliminate the possibility of fetal chromosome abnormalities in regions tested or and other areas of the genome. The tests cannot rule out other genetic diseases or birth defects, including open neural tube defects.     A fetal ultrasound evaluation was performed today and a detailed report included under the media tab from today's date. A living bae intrauterine fetus was identified in the cephalic presentation, with normal fetal heart motion and normal fetal motion noted. The placenta was on the posterior and right lateral aspects of the uterus. The amniotic fluid volume was within normal limits. The biometric measurements were consistent with the patient's established dating parameters, within the limits of error the test at the current gestational age. Visualization of the fetal anatomy was somewhat limited secondary to poor acoustic transmission to the patient's anterior abdominal wall in the fetal position.   No apparent gross fetal anatomic abnormalities were identified in the areas which were visualized.     The placenta was noted to be low-lying.                             GENETIC SCREENING/TERATOLOGY COUNSELING                          (Includes patient, FTB, and any affected family members)     Patient Age > 34 Years NO   Thalassemia ( MVC<80) NO   Congential Heart Defect NO   Neural Tube Defect NO   Blu-Sachs NO   Sickle Cell Disease NO   Sickle Cell Trait NO   Sickle C Disease or Trait NO   Hemophilia NO   Muscular Dystrophy NO   Cystic Fibrosis NO   [place] Disease NO   Autism NO   Mental Retardation NO   History of Fragile X NO   Maternal Diabetes NO   Other Genetic Disease or Syndrome NO Previous Child With Congenital Abnormality Not Listed NO   Recreational Drugs NO                                                INFECTION HISTORY          HEPATITIS IMMUNIZED:  YES   HEPATITIS INFECTION:  NO   EXPOSURE TO TB NO   GENITAL HERPES    NO   PARVOVIRUS B-19 NO   CHICKEN POX  YES   MEASLES NO   STD NO   HIV NO   OTHER RASH OR VIRAL ILLNESS SINCE LMP NO   UTI RECURRENT NO   HPV NO      OB History    Para Term  AB Living   6 2 2   3 1   SAB TAB Ectopic Molar Multiple Live Births     2 1       1       # Outcome Date GA Lbr Herson/2nd Weight Sex Delivery Anes PTL Lv   6 Current                     5 TAB 2019                   4  2019                   3  2019                   2 Term 17 37w2d   6 lb 9 oz (2.977 kg) F Vag-Spont EPI N DARIUS   1 Term 16 38w5d   6 lb 10 oz (3.005 kg) F Vag-Spont EPI N FD      PAST GYNECOLOGICAL  HISTORY:  Positive for abnormal pap smears. Doesn't know the grade  Negative for cervical LEEP / conization /cryosurgery.    Negative for uterine surgery.    Negative for ovarian or tubal surgery.      Past Medical History:   Diagnosis Date    Abnormal Pap smear of cervix      will repeat after delivery    Gestational diabetes mellitus (GDM) affecting pregnancy, antepartum 2020         Past Surgical History:   Procedure Laterality Date    DILATION AND CURETTAGE   2019     X 2    MASTOIDECTOMY        TYMPANOSTOMY TUBE PLACEMENT          No Known Allergies     Current Outpatient Medications:     vitamin B-6 (PYRIDOXINE) 50 MG tablet, Take 50 mg by mouth daily    aspirin 81 MG chewable tablet, Take 81 mg by mouth daily     diphenhydrAMINE HCl, Sleep, (UNISOM SLEEPMELTS PO), Take by mouth    Prenatal Vit-Fe Fumarate-FA (PRENATAL PO), Take by mouth     Social History      Tobacco Use    Smoking status: Former Smoker       Packs/day: 0.20       Types: Cigarettes       Last attempt to quit:        Years since quittin.4 4.  History of 2 spontaneous first trimester pregnancy losses    5.  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 6.  First trimester NT measurement was at the 65th growth percentile    7.  First trimester nasal bone visualization    8. [place] screen showed no increased risk for fetal aneuploidy for the chromosomes assessed. 9.  Low-lying placenta August 13, 2020  10. Normal cervical length August 13, 2020    PLAN:  I advised the patient to do home glucose monitoring. She is to check her blood sugars fasting and 2 hours after each meal. Her fasting blood sugars should be less than 92 mg/dl and her 2 hour post prandial blood sugars less than 120 mg/dl. She is to call if her blood sugars are outside of these parameters.     The patient was advised to call if she has any increased vaginal discharge, vaginal bleeding, contractions, abdominal pain, back pain or any new significant symptomatology prior to her next visit. I advised her that these are signs and symptoms of cervical change and require follow-up assessment when they occur.     I requested the patient return for a follow-up assessment in 3-4 weeks unless there is a clinical reason for her to return prior to that time. She is to call if she has any problems or questions prior to her next visit. Further evaluation and management will be dependent on her clinical presentation and the results of her testing.      The patient is to continue to follow with you in your office for ongoing obstetric care.     I spent 16 minutes of direct contact time with the patient of which greater than 50% of the time was to discuss complications and problems related to her pregnancy.  I discussed my recommendations for management of her blood sugars during pregnancy.  We reviewed the results of her ultrasound assessment performed today.  I discussed my recommendations for ongoing evaluation and management of her pregnancy.  I answered all of her questions to her satisfaction.  I asked her to call if she had any additional questions prior to her next visit.       If you have any questions regarding her management, please contact me at your convenience and thank you for allowing me to participate in her care.     Sincerely,           Steven Lozoya MD, [address], [place], [address] [facility],  Tori Garcia [facility]  Director [address] [place] [address]  [phone]

## 2020-08-20 ENCOUNTER — ROUTINE PRENATAL (OUTPATIENT)
Dept: OBGYN | Age: 30
End: 2020-08-20
Payer: COMMERCIAL

## 2020-08-20 VITALS
DIASTOLIC BLOOD PRESSURE: 73 MMHG | SYSTOLIC BLOOD PRESSURE: 114 MMHG | BODY MASS INDEX: 33.99 KG/M2 | WEIGHT: 198 LBS | TEMPERATURE: 97.6 F | HEART RATE: 72 BPM

## 2020-08-20 LAB
GLUCOSE URINE, POC: NORMAL
PROTEIN UA: NEGATIVE

## 2020-08-20 PROCEDURE — 0502F SUBSEQUENT PRENATAL CARE: CPT | Performed by: OBSTETRICS & GYNECOLOGY

## 2020-08-20 PROCEDURE — 81002 URINALYSIS NONAUTO W/O SCOPE: CPT | Performed by: OBSTETRICS & GYNECOLOGY

## 2020-08-20 PROCEDURE — 99213 OFFICE O/P EST LOW 20 MIN: CPT | Performed by: OBSTETRICS & GYNECOLOGY

## 2020-08-20 RX ORDER — SERTRALINE HYDROCHLORIDE 25 MG/1
25 TABLET, FILM COATED ORAL DAILY
Qty: 30 TABLET | Refills: 6 | Status: SHIPPED
Start: 2020-08-20 | End: 2022-02-22 | Stop reason: SDUPTHER

## 2020-08-21 ENCOUNTER — TELEPHONE (OUTPATIENT)
Dept: FAMILY MEDICINE CLINIC | Age: 30
End: 2020-08-21

## 2020-08-21 NOTE — TELEPHONE ENCOUNTER
LSW attempted phone call to patient regarding referral from Dr. Cecy Conn. Left message to return call to LSW next business day 8/24/2020 as it was end of day.

## 2020-08-27 ENCOUNTER — ROUTINE PRENATAL (OUTPATIENT)
Dept: OBGYN CLINIC | Age: 30
End: 2020-08-27
Payer: COMMERCIAL

## 2020-08-27 VITALS
BODY MASS INDEX: 33.97 KG/M2 | SYSTOLIC BLOOD PRESSURE: 112 MMHG | HEART RATE: 112 BPM | TEMPERATURE: 97.5 F | DIASTOLIC BLOOD PRESSURE: 74 MMHG | HEIGHT: 64 IN | WEIGHT: 199 LBS

## 2020-08-27 PROCEDURE — 99213 OFFICE O/P EST LOW 20 MIN: CPT | Performed by: OBSTETRICS & GYNECOLOGY

## 2020-08-27 PROCEDURE — 81002 URINALYSIS NONAUTO W/O SCOPE: CPT | Performed by: OBSTETRICS & GYNECOLOGY

## 2020-08-27 PROCEDURE — 76816 OB US FOLLOW-UP PER FETUS: CPT | Performed by: OBSTETRICS & GYNECOLOGY

## 2020-08-27 NOTE — PROGRESS NOTES
Pt denies bleeding,lof,crampingPt states good fetal movement. Pt states blood sugars wnl. Did not bring blood sugar recordAll questions answered+ information confirmed by pt.

## 2020-08-27 NOTE — PATIENT INSTRUCTIONS
Please arrive for your scheduled appointment at least 15 minutes early with your actual insurance card+ a photo ID. Also if you need any refills ordered or have questions, it may take up 48 hours to reply. Please allow ample time for your refills. Call me when you use last refill. Thank you for your cooperation. Any questions contact Julisa at [phone]. If you are experiencing an emergency and need immediate help, call 911 or go to go emergency room or labor and delivery. if you are sick, not feeling well or have an infectious process going on please reschedule your appointment by calling [phone]. Also if any family members are not feeling well, please do not bring them to your appointment. We appreciate your cooperation. We are doing this in order to protect our pregnant mothers+ their babies. Call your primary obstetrician with bleeding, leaking of fluid, abdominal tenderness, headache, blurry vision, epigastric pain and increased urinary frequency. Patient Education        Weeks 22 to 26 of Your Pregnancy: Care Instructions  Your Care Instructions     As you enter your 7th month of pregnancy at week 26, your baby's lungs are growing stronger and getting ready to breathe. You may notice that your baby responds to the sound of your or your partner's voice. You may also notice that your baby does less turning and twisting and more squirming or jerking. Jerking often means that your baby has the hiccups. Hiccups are perfectly normal and are only temporary. You may want to think about attending a childbirth preparation class. This is also a good time to start thinking about whether you want to have pain medicine during labor. Most pregnant women are tested for gestational diabetes between weeks 25 and 28. Gestational diabetes occurs when your blood sugar level gets too high when you're pregnant. The test is important, because you can have gestational diabetes and not know it.  But the condition can cause problems for your baby. Follow-up care is a key part of your treatment and safety. Be sure to make and go to all appointments, and call your doctor if you are having problems. It's also a good idea to know your test results and keep a list of the medicines you take. How can you care for yourself at home? Ease discomfort from your baby's kicking  · Change your position. Sometimes this will cause your baby to change position too. · Take a deep breath while you raise your arm over your head. Then breathe out while you drop your arm. Do Kegel exercises to prevent urine from leaking  · You can do Kegel exercises while you stand or sit. ? Squeeze the same muscles you would use to stop your urine. Your belly and thighs should not move. ? Hold the squeeze for 3 seconds, and then relax for 3 seconds. ? Start with 3 seconds. Then add 1 second each week until you are able to squeeze for 10 seconds. ? Repeat the exercise 10 to 15 times for each session. Do three or more sessions each day. Ease or reduce swelling in your feet, ankles, hands, and fingers  · If your fingers are puffy, take off your rings. · Do not eat high-salt foods, such as potato chips. · Prop up your feet on a stool or couch as much as possible. Sleep with pillows under your feet. · Do not stand for long periods of time or wear tight shoes. · Wear support stockings. Where can you learn more? Go to https://Mill Creek Life SciencespeSatNav Technologies.TinyTap. org and sign in to your Emp[employer] account. Enter G264 in the [facility] box to learn more about \"Weeks 22 to 26 of Your Pregnancy: Care Instructions. \"     If you do not have an account, please click on the \"Sign Up Now\" link. Current as of: February 11, 2020               Content Version: 12.5  © 5894-5676 Healthwise, Incorporated. Care instructions adapted under license by [facility] ([facility]).  If you have questions about a medical condition or this instruction, always ask your healthcare professional. [place], [facility] disclaims any warranty or liability for your use of this information. Patient Education        Learning About When to Call Your Doctor During Pregnancy (After 20 Weeks)  Your Care Instructions  It's common to have concerns about what might be a problem during pregnancy. Although most pregnant women don't have any serious problems, it's important to know when to call your doctor if you have certain symptoms or signs of labor. These are general suggestions. Your doctor may give you some more information about when to call. When to call your doctor (after 20 weeks)  Call 911 anytime you think you may need emergency care. For example, call if:  · You have severe vaginal bleeding. · You have sudden, severe pain in your belly. · You passed out (lost consciousness). · You have a seizure. · You see or feel the umbilical cord. · You think you are about to deliver your baby and can't make it safely to the hospital.  Call your doctor now or seek immediate medical care if:  · You have vaginal bleeding. · You have belly pain. · You have a fever. · You have symptoms of preeclampsia, such as:  ? Sudden swelling of your face, hands, or feet. ? New vision problems (such as dimness, blurring, or seeing spots). ? A severe headache. · You have a sudden release of fluid from your vagina. (You think your water broke.)  · You think that you may be in labor. This means that you've had at least 6 contractions in an hour. · You notice that your baby has stopped moving or is moving much less than normal.  · You have symptoms of a urinary tract infection. These may include:  ? Pain or burning when you urinate. ? A frequent need to urinate without being able to pass much urine. ? Pain in the flank, which is just below the rib cage and above the waist on either side of the back. ? Blood in your urine.   Watch closely for changes in your health, and be sure to contact your doctor if:  · You have vaginal discharge that smells bad. · You have skin changes, such as:  ? A rash. ? Itching. ? Yellow color to your skin. · You have other concerns about your pregnancy. If you have labor signs at 37 weeks or more  If you have signs of labor at 37 weeks or more, your doctor may tell you to call when your labor becomes more active. Symptoms of active labor include:  · Contractions that are regular. · Contractions that are less than 5 minutes apart. · Contractions that are hard to talk through. Follow-up care is a key part of your treatment and safety. Be sure to make and go to all appointments, and call your doctor if you are having problems. It's also a good idea to know your test results and keep a list of the medicines you take. Where can you learn more? Go to https://Netbiscuitspepiceweb.Accenx Technologies. org and sign in to your ProBueno account. Enter  in the Rebls box to learn more about \"Learning About When to Call Your Doctor During Pregnancy (After 20 Weeks). \"     If you do not have an account, please click on the \"Sign Up Now\" link. Current as of: February 11, 2020               Content Version: 12.5  © 5231-4905 Healthwise, Rubysophic. Care instructions adapted under license by [facility] ([facility]). If you have questions about a medical condition or this instruction, always ask your healthcare professional. [address] any warranty or liability for your use of this information. Patient Education        Diabetes Blood Sugar Emergencies: Your Action Plan  How can you prevent a blood sugar emergency? An important part of living with diabetes is keeping your blood sugar in your target range. You'll need to know what to do if it's too high or too low. Managing your blood sugar levels helps you avoid emergencies. This care sheet will teach you about the signs of high and low blood sugar.  It will help you make an action plan with your doctor for when these signs occur.  Low blood sugar is more likely to happen if you take certain medicines for diabetes. It can also happen if you skip a meal, drink alcohol, or exercise more than usual.  You may get high blood sugar if you eat differently than you normally do. One example is eating more carbohydrate than usual. Having a cold, the flu, or other sudden illness can also cause high blood sugar levels. Levels can also rise if you miss a dose of medicine. Any change in how you take your medicine may affect your blood sugar level. So it's important to work with your doctor before you make any changes. Check your blood sugar  Work with your doctor to fill in the blank spaces below that apply to you. Track your levels, know your target range, and write down ways you can get your blood sugar back in your target range. A log book can help you track your levels. Take the book to all of your medical appointments. · Check your blood sugar _____ times a day, at these times:________________________________________________. (For example: Before meals, at bedtime, before exercise, during exercise, other.)  · Your blood sugar target range before a meal is ___________________. Your blood sugar target range after a meal is _______________________. · Do this--___________________________________________________--to get your blood sugar back within your safe range if your blood sugar results are _________________________________________. (For example: Less than 70 or above 250 mg/dL.)  Call your doctor when your blood sugar results are ___________________________________. (For example: Less than 70 or above 250 mg/dL.)  What are the symptoms of low and high blood sugar? Common symptoms of low blood sugar are sweating and feeling shaky, weak, hungry, or confused. Symptoms can start quickly. Common symptoms of high blood sugar are feeling very thirsty or very hungry.  You may also pass urine more often than usual. You may have blurry vision and may lose weight without trying. But some people may have high or low blood sugar without having any symptoms. That's a good reason to check your blood sugar on a regular schedule. What should you do if you have symptoms? Work with your doctor to fill in the blank spaces below that apply to you. Low blood sugar  If you have symptoms of low blood sugar, check your blood sugar. If it's below _____ ( for example, below 70), eat or drink a quick-sugar food that has about 15 grams of carbohydrate. Your goal is to get your level back to your safe range. Check your blood sugar again 15 minutes later. If it's still not in your target range, take another 15 grams of carbohydrate and check your blood sugar again in 15 minutes. Repeat this until you reach your target. Then go back to your regular testing schedule. Children usually need less than 15 grams of carbohydrate. Check with your doctor or diabetes educator for the amount that is right for your child. When you have low blood sugar, it's best to stop or reduce any physical activity until your blood sugar is back in your target range and is stable. If you must stay active, eat or drink 30 grams of carbohydrate. Then check your blood sugar again in 15 minutes. If it's not in your target range, take another 30 grams of carbohydrates. Check your blood sugar again in 15 minutes. Keep doing this until you reach your target. You can then go back to your regular testing schedule. If your symptoms or blood sugar levels are getting worse or have not improved after 15 minutes, seek medical care right away. Here are some examples of quick-sugar foods with 15 grams of carbohydrate:  · 3 or 4 glucose tablets  · 1 tablespoon (3 teaspoons) table sugar  · ½ cup to ¾ cup (4 to 6 ounces) of fruit juice or regular (not diet) soda  · Hard candy (such as 6 Life Savers)  High blood sugar  If you have symptoms of high blood sugar, check your blood sugar.  Your goal is to get your level back to your target range. If it's above ______ ( for example, above 250), follow these steps:  · If you missed a dose of your diabetes medicine, take it now. Take only the amount of medicine that you have been prescribed. Do not take more or less medicine. · Give yourself insulin if your doctor has prescribed it for high blood sugar. · Test for ketones, if the doctor told you to do so. If the results of the ketone test show a moderate-to-large amount of ketones, call the doctor for advice. · Wait 30 minutes after you take the extra insulin or the missed medicine. Check your blood sugar again. If your symptoms or blood sugar levels are getting worse or have not improved after taking these steps, seek medical care right away. Follow-up care is a key part of your treatment and safety. Be sure to make and go to all appointments, and call your doctor if you are having problems. It's also a good idea to know your test results and keep a list of the medicines you take. Where can you learn more? Go to https://Mama.FusionStorm. org and sign in to your Zyraz Technology account. Enter N704 in the Whatser box to learn more about \"Diabetes Blood Sugar Emergencies: Your Action Plan. \"     If you do not have an account, please click on the \"Sign Up Now\" link. Current as of: December 20, 2019               Content Version: 12.5  © 2100-0484 Healthwise, Incorporated. Care instructions adapted under license by [facility] ([facility]). If you have questions about a medical condition or this instruction, always ask your healthcare professional. [place] [address] any warranty or liability for your use of this information. Patient Education        Factor V Leiden: Care Instructions  Your Care Instructions     Factor V Leiden is the most common inherited condition that causes an increase in blood clotting.  It increases the chances that your blood will form abnormal blood clots that can be dangerous. Clots can form in the veins near your bones that carry a lot of blood (deep veins), a condition called deep vein thrombosis. A clot can travel through the blood to a lung and cause a serious problem called a pulmonary embolism. Many things can increase the chance of blood clots, including smoking and not being able to walk or move around for a long time. Your treatment may include a medicine (called a blood thinner) that prevents blood clots. A healthy lifestyle also can lower your risk of a blood clot. Factor V Leiden is caused by a faulty gene that you inherit from one or both parents. Talk with your doctor about whether other people in your family should be tested for the faulty gene. Follow-up care is a key part of your treatment and safety. Be sure to make and go to all appointments, and call your doctor if you are having problems. It's also a good idea to know your test results and keep a list of the medicines you take. How can you care for yourself at home? · Take your medicines exactly as prescribed. Call your doctor if you think you are having a problem with your medicine. · If you take a blood thinner, be sure you get instructions about how to take your medicine safely. Blood thinners can cause serious bleeding problems. · Stay at a healthy weight. · Eat a balanced diet that is low in fat. · Get at least 30 minutes of exercise on most days of the week. Walking is a good choice. You also may want to do other activities, such as running, swimming, cycling, or playing tennis or team sports. · Try not to sit or lie down for long periods. If you are in bed at home recovering from an injury or surgery, ask your doctor how often you should move around or do exercises. If you are on a long car trip, stop every hour or so. Get out and walk around for a few minutes. If you are traveling by bus, train, or plane, walk up and down the aisle every hour or so.   · Some medicines, such as birth control pills, hormones, antibiotics, celecoxib (Celebrex), and raloxifene (Evista), can increase your chances of a blood clot. Check with your doctor before taking any medicines. · Do not smoke. It can increase the risk of blood clots. If you need help quitting, talk to your doctor about stop-smoking programs and medicines. These can increase your chances of quitting for good. · Let doctors you see know that you have factor V Leiden. · Wear medical alert jewelry that lists your clotting problem. You can buy it online or at most Research Journalist. When should you call for help? Call  911 anytime you think you may need emergency care. For example, call if:  · You have sudden chest pain and shortness of breath. · You cough up blood. Call your doctor now or seek immediate medical care if:  · You have signs of a blood clot, such as:  ? Pain in your calf, back of the knee, thigh, or groin. ? Redness and swelling in your leg or groin. Watch closely for changes in your health, and be sure to contact your doctor if you have any problems. Where can you learn more? Go to https://Prelert.AllTrails. org and sign in to your Priva Security Corporation account. Enter X475 in the [employer] Materials box to learn more about \"Factor V Leiden: Care Instructions. \"     If you do not have an account, please click on the \"Sign Up Now\" link. Current as of: March 4, 2020               Content Version: 12.5  © 2006-2020 Healthwise, Storytree. Care instructions adapted under license by [facility] ([facility]). If you have questions about a medical condition or this instruction, always ask your healthcare professional. [place] [address] any warranty or liability for your use of this information.

## 2020-08-27 NOTE — LETTER
20    Caleb Kyle, [address] [facility], [address]                RE: Tc Gonzalez  : 1990   AGE: 26 y.o.     This report has been created using voice recognition software. It may contain errors which are inherent in voice recognition technology.     Dear Dr. Rupa Petit:  Allyssa Waller a fetal ultrasound assessment performed today for the following indications:     Patient Active Problem List   Diagnosis    History of stillbirth in currently pregnant patient    Heterozygous MTHFR mutation I4292I ([address]    Thrombophilia affecting pregnancy in second trimester, antepartum ([address]    Recurrent pregnancy loss in pregnant patient in first trimester, antepartum    Gestational diabetes mellitus (GDM) affecting pregnancy, antepartum      Maycol Lopez is a 26 y. o. female, who is G6(2,0,3,1). She has an Estimated Date of Delivery: 20 based on her LMP and previous ultrasound assessment. Yadira Young is currently 22 weeks 0 days gestation based on that assessment.        A fetal ultrasound evaluation was performed today and a detailed report included under the media tab from today's date.  A living bae intrauterine fetus was identified in the cephalic presentation, with normal fetal heart motion and normal fetal motion noted.  The placenta was on the posterior and right lateral aspects of the uterus.  The amniotic fluid volume was within normal limits.  The biometric measurements were consistent with the patient's established dating parameters, within the limits of error the test at the current gestational age. The estimated fetal weight was at the 44th growth percentile for gestational age. There is been normal interval fetal growth since previous ultrasound assessment.   Visualization of the fetal anatomy was somewhat limited secondary to poor acoustic transmission to the patient's anterior abdominal wall in the fetal position.  No apparent gross fetal anatomic abnormalities were identified in the areas which were visualized.      IMPRESSION:    1.  IUP at 22 weeks 0 days gestation based on her Estimated Date of Delivery: 12/31/20    2.  Gestational diabetes versus type 2 diabetes. 3.  History of a prior stillbirth at 37 weeks 5 days gestational age    3.  History of 2 spontaneous first trimester pregnancy losses    5.  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 6.  First trimester NT measurement was at the 65th growth percentile    7.  First trimester nasal bone visualization    8.  McCool screen showed no increased risk for fetal aneuploidy for the chromosomes assessed. 9.  Low-lying placenta, resolved  10. Normal cervical length August 13, 2020  11. Normal interval fetal growth    PLAN:  I advised the patient to do home glucose monitoring. She is to check her blood sugars fasting and 2 hours after each meal. Her fasting blood sugars should be less than 92 mg/dl and her 2 hour post prandial blood sugars less than 120 mg/dl. She is to call if her blood sugars are outside of these parameters.     The patient was advised to call if she has any increased vaginal discharge, vaginal bleeding, contractions, abdominal pain, back pain or any new significant symptomatology prior to her next visit. I advised her that these are signs and symptoms of cervical change and require follow-up assessment when they occur.     I requested the patient return for a follow-up assessment in 4 weeks unless there is a clinical reason for her to return prior to that time. She is to call if she has any problems or questions prior to her next visit.  Further evaluation and management will be dependent on her clinical presentation and the results of her testing.      The patient is to continue to follow with you in your office for ongoing obstetric care.     I spent 10 minutes of contact time with the patient of which greater than 50% of the time was to discuss complications and problems related to her pregnancy.  I discussed my recommendations for management of her blood sugars during pregnancy. I reviewed the results of her ultrasound assessment performed today.  I discussed my recommendations for ongoing evaluation and management of her pregnancy.  I answered all of her questions to her satisfaction.  I asked her to call if she had any additional questions prior to her next visit.       If you have any questions regarding her management, please contact me at your convenience and thank you for allowing me to participate in her care.     Sincerely,           Francisca Bunch MD, [address], Lorie Driscoll, [address] [facility],  Tori Garcia, [facility]  Director [address] [place] [address]  [phone]

## 2020-08-31 ENCOUNTER — TELEPHONE (OUTPATIENT)
Dept: OBGYN CLINIC | Age: 30
End: 2020-08-31

## 2020-08-31 LAB
GLUCOSE URINE, POC: NORMAL
PROTEIN UA: NEGATIVE

## 2020-09-02 NOTE — PROGRESS NOTES
Marjorie Simms    Patient present for routine prenatal visit today at 23w0d. Saw MFM 8/27/20, patient with GDM. Next appointment is 9/24/20. States fingersticks are mostly controlled, though so are right at cutoff. Reviewed yesterday's log, all within normal limits. Patient was started on zoloft at last visit. States she is feeling so much better. She is no longer anxious, crying. She denies thoughts of hurting herself. She is able to care for herself and her child. She did speak to social work, declines counseling services at this time. Denies complaints  Denies VB, or cramping  Feeling good movement at this time. Reviewed above. Fetal Heart Rate: 125      All questions and concerns addressed at this time    Mari Browning was seen today for routine prenatal visit. 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, antepartum ([address]    Recurrent pregnancy loss in pregnant patient in first trimester, antepartum    Gestational diabetes mellitus (GDM) affecting pregnancy, antepartum    Prenatal care in second trimester    History of stillbirth in pregnant patient in third trimester, antepartum    Anxiety and depression        Return in about 4 weeks (around 10/1/2020) for OB visit.     Donnette Denver, MD

## 2020-09-03 ENCOUNTER — ROUTINE PRENATAL (OUTPATIENT)
Dept: OBGYN | Age: 30
End: 2020-09-03
Payer: COMMERCIAL

## 2020-09-03 VITALS
DIASTOLIC BLOOD PRESSURE: 73 MMHG | HEART RATE: 116 BPM | WEIGHT: 198 LBS | BODY MASS INDEX: 33.99 KG/M2 | SYSTOLIC BLOOD PRESSURE: 121 MMHG

## 2020-09-03 LAB
GLUCOSE URINE, POC: NORMAL
PROTEIN UA: NEGATIVE

## 2020-09-03 PROCEDURE — 81002 URINALYSIS NONAUTO W/O SCOPE: CPT | Performed by: OBSTETRICS & GYNECOLOGY

## 2020-09-03 PROCEDURE — 0502F SUBSEQUENT PRENATAL CARE: CPT | Performed by: OBSTETRICS & GYNECOLOGY

## 2020-09-03 PROCEDURE — 99213 OFFICE O/P EST LOW 20 MIN: CPT | Performed by: OBSTETRICS & GYNECOLOGY

## 2020-09-24 ENCOUNTER — ROUTINE PRENATAL (OUTPATIENT)
Dept: OBGYN CLINIC | Age: 30
End: 2020-09-24
Payer: COMMERCIAL

## 2020-09-24 VITALS
HEIGHT: 64 IN | WEIGHT: 202 LBS | SYSTOLIC BLOOD PRESSURE: 108 MMHG | TEMPERATURE: 97.3 F | DIASTOLIC BLOOD PRESSURE: 74 MMHG | HEART RATE: 122 BPM | BODY MASS INDEX: 34.49 KG/M2

## 2020-09-24 PROBLEM — Z3A.26 26 WEEKS GESTATION OF PREGNANCY: Status: ACTIVE | Noted: 2020-09-24

## 2020-09-24 LAB
GLUCOSE URINE, POC: NEGATIVE
PROTEIN UA: NEGATIVE

## 2020-09-24 PROCEDURE — 99213 OFFICE O/P EST LOW 20 MIN: CPT | Performed by: OBSTETRICS & GYNECOLOGY

## 2020-09-24 PROCEDURE — 76805 OB US >/= 14 WKS SNGL FETUS: CPT | Performed by: OBSTETRICS & GYNECOLOGY

## 2020-09-24 PROCEDURE — 81002 URINALYSIS NONAUTO W/O SCOPE: CPT | Performed by: OBSTETRICS & GYNECOLOGY

## 2020-09-24 PROCEDURE — 76817 TRANSVAGINAL US OBSTETRIC: CPT | Performed by: OBSTETRICS & GYNECOLOGY

## 2020-10-01 ENCOUNTER — ROUTINE PRENATAL (OUTPATIENT)
Dept: OBGYN | Age: 30
End: 2020-10-01
Payer: COMMERCIAL

## 2020-10-01 ENCOUNTER — HOSPITAL ENCOUNTER (OUTPATIENT)
Age: 30
Discharge: HOME | End: 2020-10-03
Payer: COMMERCIAL

## 2020-10-01 VITALS
HEART RATE: 120 BPM | SYSTOLIC BLOOD PRESSURE: 130 MMHG | DIASTOLIC BLOOD PRESSURE: 70 MMHG | BODY MASS INDEX: 34.84 KG/M2 | WEIGHT: 203 LBS

## 2020-10-01 LAB
GLUCOSE URINE, POC: NEGATIVE
PROTEIN UA: ABNORMAL

## 2020-10-01 PROCEDURE — 0502F SUBSEQUENT PRENATAL CARE: CPT | Performed by: OBSTETRICS & GYNECOLOGY

## 2020-10-01 PROCEDURE — 81002 URINALYSIS NONAUTO W/O SCOPE: CPT | Performed by: OBSTETRICS & GYNECOLOGY

## 2020-10-01 PROCEDURE — 36415 COLL VENOUS BLD VENIPUNCTURE: CPT

## 2020-10-01 PROCEDURE — 99213 OFFICE O/P EST LOW 20 MIN: CPT | Performed by: OBSTETRICS & GYNECOLOGY

## 2020-10-01 PROCEDURE — 90715 TDAP VACCINE 7 YRS/> IM: CPT

## 2020-10-01 PROCEDURE — 36415 COLL VENOUS BLD VENIPUNCTURE: CPT | Performed by: OBSTETRICS & GYNECOLOGY

## 2020-10-01 PROCEDURE — 86850 RBC ANTIBODY SCREEN: CPT

## 2020-10-01 PROCEDURE — 90471 IMMUNIZATION ADMIN: CPT

## 2020-10-01 PROCEDURE — 6360000002 HC RX W HCPCS

## 2020-10-01 PROCEDURE — 85027 COMPLETE CBC AUTOMATED: CPT

## 2020-10-01 NOTE — PROGRESS NOTES
Patient alert and pleasant with no complaints   Here today for prenatal visit  Urine for protein obtained with  +1 results  Urine for glucose obtained with negative results  Fetal heart tones obtained without difficulty  TDAP IM given without difficulty  Blood work obtained, labeled and sent to lab  Discharge instructions have been discussed with the patient. Patient advised to call our office with any questions or concerns. Voiced understanding.

## 2020-10-01 NOTE — PROGRESS NOTES
Tammi Sultanamary kate    Patient present for routine prenatal visit today at 27w0d. Patient GDM, diet controlled. Patient saw MFM 9/24/20, next appointment is 10/8/20. Patient is still doing well on zoloft. Blood pressure mildly elevated today. Denies headache/ vision changes. Will monitor. Denies complaints  Denies VB, or cramping  Feeling good movement at this time. Reviewed above. Fetal Heart Rate: 135    Tdap reviewed with patient, including significance to self and close caretakers. All questions and concerns addressed at this time    Antoni Yin was seen today for routine prenatal visit. 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     Navdeep Hartley MD

## 2020-10-02 LAB
ANTIBODY SCREEN: NORMAL
HCT VFR BLD CALC: 36.4 % (ref 34–48)
HEMOGLOBIN: 11.4 G/DL (ref 11.5–15.5)
MCH RBC QN AUTO: 29 PG (ref 26–35)
MCHC RBC AUTO-ENTMCNC: 31.3 % (ref 32–34.5)
MCV RBC AUTO: 92.6 FL (ref 80–99.9)
PDW BLD-RTO: 13.6 FL (ref 11.5–15)
PLATELET # BLD: 258 E9/L (ref 130–450)
PMV BLD AUTO: 10.9 FL (ref 7–12)
RBC # BLD: 3.93 E12/L (ref 3.5–5.5)
WBC # BLD: 7 E9/L (ref 4.5–11.5)

## 2020-10-08 ENCOUNTER — ROUTINE PRENATAL (OUTPATIENT)
Dept: OBGYN CLINIC | Age: 30
End: 2020-10-08
Payer: COMMERCIAL

## 2020-10-08 VITALS
SYSTOLIC BLOOD PRESSURE: 111 MMHG | BODY MASS INDEX: 34.49 KG/M2 | HEART RATE: 123 BPM | TEMPERATURE: 98 F | DIASTOLIC BLOOD PRESSURE: 77 MMHG | HEIGHT: 64 IN | WEIGHT: 202 LBS

## 2020-10-08 PROBLEM — O26.23 RECURRENT PREGNANCY LOSS IN PREGNANT PATIENT IN THIRD TRIMESTER, ANTEPARTUM: Status: ACTIVE | Noted: 2020-06-18

## 2020-10-08 PROBLEM — O99.113 THROMBOPHILIA AFFECTING PREGNANCY IN THIRD TRIMESTER, ANTEPARTUM (HCC): Status: ACTIVE | Noted: 2017-02-02

## 2020-10-08 PROCEDURE — 99213 OFFICE O/P EST LOW 20 MIN: CPT | Performed by: OBSTETRICS & GYNECOLOGY

## 2020-10-08 PROCEDURE — 81002 URINALYSIS NONAUTO W/O SCOPE: CPT | Performed by: OBSTETRICS & GYNECOLOGY

## 2020-10-08 PROCEDURE — 76816 OB US FOLLOW-UP PER FETUS: CPT | Performed by: OBSTETRICS & GYNECOLOGY

## 2020-10-08 PROCEDURE — 76819 FETAL BIOPHYS PROFIL W/O NST: CPT | Performed by: OBSTETRICS & GYNECOLOGY

## 2020-10-08 PROCEDURE — 76820 UMBILICAL ARTERY ECHO: CPT | Performed by: OBSTETRICS & GYNECOLOGY

## 2020-10-08 PROCEDURE — 76817 TRANSVAGINAL US OBSTETRIC: CPT | Performed by: OBSTETRICS & GYNECOLOGY

## 2020-10-08 NOTE — PATIENT INSTRUCTIONS
Please arrive for your scheduled appointment at least 15 minutes early with your actual insurance card+ a photo ID. Also if you need any refills ordered or have questions, it may take up 48 hours to reply. Please allow ample time for your refills. Call me when you use last refill. Thank you for your cooperation. Any questions contact [place] at [phone]. If you are experiencing an emergency and need immediate help, call 911 or go to go emergency room or labor and delivery. if you are sick, not feeling well or have an infectious process going on please reschedule your appointment by calling [phone]. Also if any family members are not feeling well, please do not bring them to your appointment. We appreciate your cooperation. We are doing this in order to protect our pregnant mothers+ their babies. Call your primary obstetrician with bleeding, leaking of fluid, abdominal tenderness, headache, blurry vision, epigastric pain and increased urinary frequency. Do kick counts after dinner. Call your primary obstetrician if less than 10 kicks in 2 hours after dinner. Call your primary obstetrician with bleeding, leaking of fluid, abdominal tenderness, headache, blurry vision, epigastric pain and increased urinary frequency. Patient Education        Weeks 26 to 30 of Your Pregnancy: Care Instructions  Your Care Instructions     You are now in your last trimester of pregnancy. Your baby is growing rapidly. And you'll probably feel your baby moving around more often. Your doctor may ask you to count your baby's kicks. Your back may ache as your body gets used to your baby's size and length. If you haven't already had the Tdap shot during this pregnancy, talk to your doctor about getting it. It will help protect your  against pertussis infection. During this time, it's important to take care of yourself and pay attention to what your body needs.  If you feel sexual, explore ways to be close with your partner that match your comfort and desire. Use the tips provided in this care sheet to find ways to be sexual in your own way. Follow-up care is a key part of your treatment and safety. Be sure to make and go to all appointments, and call your doctor if you are having problems. It's also a good idea to know your test results and keep a list of the medicines you take. How can you care for yourself at home? Take it easy at work  · Take frequent breaks. If possible, stop working when you are tired, and rest during your lunch hour. · Take bathroom breaks every 2 hours. · Change positions often. If you sit for long periods, stand up and walk around. · When you stand for a long time, keep one foot on a low stool with your knee bent. After standing a lot, sit with your feet up. · Avoid fumes, chemicals, and tobacco smoke. Be sexual in your own way  · Having sex during pregnancy is okay, unless your doctor tells you not to. · You may be very interested in sex, or you may have no interest at all. · Your growing belly can make it hard to find a good position during intercourse. [place] and explore. · You may get cramps in your uterus when your partner touches your breasts. · A back rub may relieve the backache or cramps that sometimes follow orgasm. Learn about  labor  · Watch for signs of  labor. You may be going into labor if:  ? You have menstrual-like cramps, with or without nausea. ? You have about 6 or more contractions in 1 hour, even after you have had a glass of water and are resting. ? You have a low, dull backache that does not go away when you change your position. ? You have pain or pressure in your pelvis that comes and goes in a pattern. ? You have intestinal cramping or flu-like symptoms, with or without diarrhea.  ? You notice an increase or change in your vaginal discharge. Discharge may be heavy, mucus-like, watery, or streaked with blood. ? Your water breaks.   · If you think you have  labor:  ? Drink 2 or 3 glasses of water or juice. Not drinking enough fluids can cause contractions. ? Stop what you are doing, and empty your bladder. Then lie down on your left side for at least 1 hour. ? While lying on your side, find your breast bone. Put your fingers in the soft spot just below it. Move your fingers down toward your belly button to find the top of your uterus. Check to see if it is tight. ? Contractions can be weak or strong. Record your contractions for an hour. Time a contraction from the start of one contraction to the start of the next one.  ? Single or several strong contractions without a pattern are called Bucky-Mullins contractions. They are practice contractions but not the start of labor. They often stop if you change what you are doing. ? Call your doctor if you have regular contractions. Where can you learn more? Go to https://Copytelepepiceweb.POS on CLOUD. org and sign in to your [employer] account. Enter C392 in the Connect Media Interactive box to learn more about \"Weeks 26 to 30 of Your Pregnancy: Care Instructions. \"     If you do not have an account, please click on the \"Sign Up Now\" link. Current as of: 2020               Content Version: 12.6  © 8153-7826 [employer]. Care instructions adapted under license by [facility] ([facility]). If you have questions about a medical condition or this instruction, always ask your healthcare professional. [place] [address] any warranty or liability for your use of this information. Patient Education        Learning About When to Call Your Doctor During Pregnancy (After 20 Weeks)  Your Care Instructions  It's common to have concerns about what might be a problem during pregnancy. Although most pregnant women don't have any serious problems, it's important to know when to call your doctor if you have certain symptoms or signs of labor. These are general suggestions.  Your doctor may give you some more information about when to call. When to call your doctor (after 20 weeks)  Call 911 anytime you think you may need emergency care. For example, call if:  · You have severe vaginal bleeding. · You have sudden, severe pain in your belly. · You passed out (lost consciousness). · You have a seizure. · You see or feel the umbilical cord. · You think you are about to deliver your baby and can't make it safely to the hospital.  Call your doctor now or seek immediate medical care if:  · You have vaginal bleeding. · You have belly pain. · You have a fever. · You have symptoms of preeclampsia, such as:  ? Sudden swelling of your face, hands, or feet. ? New vision problems (such as dimness, blurring, or seeing spots). ? A severe headache. · You have a sudden release of fluid from your vagina. (You think your water broke.)  · You think that you may be in labor. This means that you've had at least 6 contractions in an hour. · You notice that your baby has stopped moving or is moving much less than normal.  · You have symptoms of a urinary tract infection. These may include:  ? Pain or burning when you urinate. ? A frequent need to urinate without being able to pass much urine. ? Pain in the flank, which is just below the rib cage and above the waist on either side of the back. ? Blood in your urine. Watch closely for changes in your health, and be sure to contact your doctor if:  · You have vaginal discharge that smells bad. · You have skin changes, such as:  ? A rash. ? Itching. ? Yellow color to your skin. · You have other concerns about your pregnancy. If you have labor signs at 37 weeks or more  If you have signs of labor at 37 weeks or more, your doctor may tell you to call when your labor becomes more active. Symptoms of active labor include:  · Contractions that are regular. · Contractions that are less than 5 minutes apart.   · Contractions that are hard to talk through. Follow-up care is a key part of your treatment and safety. Be sure to make and go to all appointments, and call your doctor if you are having problems. It's also a good idea to know your test results and keep a list of the medicines you take. Where can you learn more? Go to https://chpepiceweb.yourdelivery. org and sign in to your Bird[employer] account. Enter  in the PrestaShop box to learn more about \"Learning About When to Call Your Doctor During Pregnancy (After 20 Weeks). \"     If you do not have an account, please click on the \"Sign Up Now\" link. Current as of: February 11, 2020               Content Version: 12.6  © 2006-2020 [employer]. Care instructions adapted under license by [facility] ([facility]). If you have questions about a medical condition or this instruction, always ask your healthcare professional. [place] [address] any warranty or liability for your use of this information. Patient Education        Counting Your Baby's Kicks: Care Instructions  Your Care Instructions     Counting your baby's kicks is one way your doctor can tell that your baby is healthy. Most women--especially in a first pregnancy--feel their baby move for the first time between 16 and 22 weeks. The movement may feel like flutters rather than kicks. Your baby may move more at certain times of the day. When you are active, you may notice less kicking than when you are resting. At your prenatal visits, your doctor will ask whether the baby is active. In your last trimester, your doctor may ask you to count the number of times you feel your baby move. Follow-up care is a key part of your treatment and safety. Be sure to make and go to all appointments, and call your doctor if you are having problems. It's also a good idea to know your test results and keep a list of the medicines you take. How do you count fetal kicks?   · A common method of checking your baby's movement is to count the number of kicks or moves you feel in 1 hour. Ten movements (such as kicks, flutters, or rolls) in 1 hour are normal. Some doctors suggest that you count in the morning until you get to 10 movements. Then you can quit for that day and start again the next day. · Pick your baby's most active time of day to count. This may be any time from morning to evening. · If you do not feel 10 movements in an hour, your baby may be sleeping. Wait for the next hour and count again. When should you call for help? Call your doctor now or seek immediate medical care if:    · You noticed that your baby has stopped moving or is moving much less than normal.   Watch closely for changes in your health, and be sure to contact your doctor if you have any problems. Where can you learn more? Go to https://Silicon Cloudpepiceweb.1stGig.com. org and sign in to your [employer] account. Enter T893 in the ETHERA box to learn more about \"Counting Your Baby's Kicks: Care Instructions. \"     If you do not have an account, please click on the \"Sign Up Now\" link. Current as of: February 11, 2020               Content Version: 12.6  © 1236-1501 [employer]. Care instructions adapted under license by [facility] ([facility]). If you have questions about a medical condition or this instruction, always ask your healthcare professional. [place] any warranty or liability for your use of this information. Patient Education        Diabetes Blood Sugar Emergencies: Your Action Plan  How can you prevent a blood sugar emergency? An important part of living with diabetes is keeping your blood sugar in your target range. You'll need to know what to do if it's too high or too low. Managing your blood sugar levels helps you avoid emergencies. This care sheet will teach you about the signs of high and low blood sugar.  It will help you make an action plan with your doctor for when these signs occur.  Low blood sugar is more likely to happen if you take certain medicines for diabetes. It can also happen if you skip a meal, drink alcohol, or exercise more than usual.  You may get high blood sugar if you eat differently than you normally do. One example is eating more carbohydrate than usual. Having a cold, the flu, or other sudden illness can also cause high blood sugar levels. Levels can also rise if you miss a dose of medicine. Any change in how you take your medicine may affect your blood sugar level. So it's important to work with your doctor before you make any changes. Check your blood sugar  Work with your doctor to fill in the blank spaces below that apply to you. Track your levels, know your target range, and write down ways you can get your blood sugar back in your target range. A log book can help you track your levels. Take the book to all of your medical appointments. · Check your blood sugar _____ times a day, at these times:________________________________________________. (For example: Before meals, at bedtime, before exercise, during exercise, other.)  · Your blood sugar target range before a meal is ___________________. Your blood sugar target range after a meal is _______________________. · Do this--___________________________________________________--to get your blood sugar back within your safe range if your blood sugar results are _________________________________________. (For example: Less than 70 or above 250 mg/dL.)  Call your doctor when your blood sugar results are ___________________________________. (For example: Less than 70 or above 250 mg/dL.)  What are the symptoms of low and high blood sugar? Common symptoms of low blood sugar are sweating and feeling shaky, weak, hungry, or confused. Symptoms can start quickly. Common symptoms of high blood sugar are feeling very thirsty or very hungry.  You may also pass urine more often than usual. You may have blurry vision and may lose weight without trying. But some people may have high or low blood sugar without having any symptoms. That's a good reason to check your blood sugar on a regular schedule. What should you do if you have symptoms? Work with your doctor to fill in the blank spaces below that apply to you. Low blood sugar  If you have symptoms of low blood sugar, check your blood sugar. If it's below _____ ( for example, below 70), eat or drink a quick-sugar food that has about 15 grams of carbohydrate. Your goal is to get your level back to your safe range. Check your blood sugar again 15 minutes later. If it's still not in your target range, take another 15 grams of carbohydrate and check your blood sugar again in 15 minutes. Repeat this until you reach your target. Then go back to your regular testing schedule. Children usually need less than 15 grams of carbohydrate. Check with your doctor or diabetes educator for the amount that is right for your child. When you have low blood sugar, it's best to stop or reduce any physical activity until your blood sugar is back in your target range and is stable. If you must stay active, eat or drink 30 grams of carbohydrate. Then check your blood sugar again in 15 minutes. If it's not in your target range, take another 30 grams of carbohydrates. Check your blood sugar again in 15 minutes. Keep doing this until you reach your target. You can then go back to your regular testing schedule. If your symptoms or blood sugar levels are getting worse or have not improved after 15 minutes, seek medical care right away. Here are some examples of quick-sugar foods with 15 grams of carbohydrate:  · 3 or 4 glucose tablets  · 1 tablespoon (3 teaspoons) table sugar  · ½ cup to ¾ cup (4 to 6 ounces) of fruit juice or regular (not diet) soda  · Hard candy (such as 6 Life Savers)  High blood sugar  If you have symptoms of high blood sugar, check your blood sugar.  Your goal is to get your level back to your target range. If it's above ______ ( for example, above 250), follow these steps:  · If you missed a dose of your diabetes medicine, take it now. Take only the amount of medicine that you have been prescribed. Do not take more or less medicine. · Give yourself insulin if your doctor has prescribed it for high blood sugar. · Test for ketones, if the doctor told you to do so. If the results of the ketone test show a moderate-to-large amount of ketones, call the doctor for advice. · Wait 30 minutes after you take the extra insulin or the missed medicine. Check your blood sugar again. If your symptoms or blood sugar levels are getting worse or have not improved after taking these steps, seek medical care right away. Follow-up care is a key part of your treatment and safety. Be sure to make and go to all appointments, and call your doctor if you are having problems. It's also a good idea to know your test results and keep a list of the medicines you take. Where can you learn more? Go to https://OwnerIQpeBabybe.Good Seed. org and sign in to your [facility] account. Enter S898 in the [facility] box to learn more about \"Diabetes Blood Sugar Emergencies: Your Action Plan. \"     If you do not have an account, please click on the \"Sign Up Now\" link. Current as of: December 20, 2019               Content Version: 12.6  © 7759-6655 [employer]. Care instructions adapted under license by [facility] ([facility]). If you have questions about a medical condition or this instruction, always ask your healthcare professional. [place] [address] any warranty or liability for your use of this information.

## 2020-10-08 NOTE — LETTER
10/8/20    Christiano Tamayo, [address] [facility]  [place], [address]                RE: Komal Cole  : 1990   AGE: 26 y.o.     This report has been created using voice recognition software. It may contain errors which are inherent in voice recognition technology.     Dear Dr. 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 o. female, who is G6(2,0,3,1). She has an Estimated Date of Delivery: 20 based on her LMP and previous ultrasound assessment. Michael Bryan is currently 28 weeks 0 days gestation based on that assessment.      The patient denies having any new problems since her last visit. Her fetal movement has been good. She has had no increased vaginal discharge, vaginal bleeding, back pain, dysuria, hematuria, or leaking of amniotic fluid.     I reviewed the patient's home blood sugar monitoring results. Her blood sugar control has generally been good with the majority of her results within the parameters she has been given to follow.      I previously advised the patient that she has an increased risk for developing diabetes in her lifetime secondary to having gestational diabetes. This risk has been reported to be approximately 60%. Weight loss, exercise and appropriate dietary intake following delivery can be of help in modifying this risk. If the patient's blood sugars normalize without treatment following delivery a GTT should be performed at 6 weeks post partum.  She should be referred to her primary care physician for ongoing follow-up after delivery.     The patient had 2 prior spontaneous first trimester pregnancy losses.  One of the pregnancies had no yolk sac or embryo identified.  She had a positive pregnancy test which subsequently became negative.     The patient terminated a pregnancy in the first trimester as a result of suspected trisomy 13 based on a chorionic villous sampling.  When the full karyotype analysis was completed it was found that this fetus actually had a normal karyotype.     The patient had a stillbirth with her first pregnancy at 45 weeks 5 days gestational age. She stated that no specific cause was determined. She had no history of medical or surgical disease. She had no history of trauma. She stated that she had decreased fetal movement on the Sunday prior to the Monday on which the baby was confirmed to be demised. No autopsy was performed. The patient stated that the baby's karyotype was normal, however the fetal skin biopsy sample was reported as not able to have a karyotype completed.      The results of [place] screen were negative. I advised her that this a screening test not a diagnostic test. I advised her that the test screens only for trisomy 21, 18 and 13. We discussed the sensitivity of the test which I advised the patient is as follows:       99.99% for detection of trisomy 21 with a specificity of 99.9%     99.99% for trisomy 25 with a specificity of 99.6%     99.99% for trisomy 15 with a specificity of 99.7%      She understands that the [facility] testing does not replace diagnostic genetic testing. She understands the limitations of this testing, including, but not limited to, not detecting partial fetal karyotype abnormalities, and in some cases, limited information regarding unbalanced translocations.  The test is also limited in that the results may not reflect chromosomes of the fetus due to confined placental mosaicism or chromosomal changes in the mother.  The test is validated for single and twin pregnancies with a gestational age of at least 9 weeks.  Chromosomal mosaicism cannot be distinguished, and in some cases, not detected by this method.  A negative test does not eliminate the possibility of fetal chromosome abnormalities in regions tested or and other areas of the genome.  The tests cannot rule out other genetic diseases or birth defects, including open neural tube defects.     A fetal ultrasound evaluation was performed today and a detailed report included under the media tab from today's date.  A living bae intrauterine fetus was identified in the breech presentation, with normal fetal heart motion and normal fetal motion noted.  The placenta was on the posterior and right lateral aspects of the uterus.  The amniotic fluid index was 15.1 cm.  The estimated fetal weight was at the 55 growth percentile for gestational age. Visualization of the fetal anatomy was somewhat limited secondary to poor acoustic transmission to the patient's anterior abdominal wall in the fetal position.  No apparent gross fetal anatomic abnormalities were identified in the areas which were visualized. The biophysical profile and cord Doppler studies were both reassuring.   There was no evidence of absence, or reversal of end-diastolic flow.                              GENETIC SCREENING/TERATOLOGY COUNSELING                          (Includes patient, FTB, and any affected family members)     Patient Age > 35 Years NO   Thalassemia ( MVC<80) NO   Congential Heart Defect NO   Neural Tube Defect NO   Blu-Sachs NO   Sickle Cell Disease NO   Sickle Cell Trait NO   Sickle C Disease or Trait NO   Hemophilia NO   Muscular Dystrophy NO   Cystic Fibrosis NO   Cr Disease NO   Autism NO   Mental Retardation NO   History of Fragile X NO   Maternal Diabetes NO   Other Genetic Disease or Syndrome NO   Previous Child With Congenital Abnormality Not Listed NO   Recreational Drugs NO                                               [id number] HISTORY          HEPATITIS IMMUNIZED:  YES   HEPATITIS INFECTION:  NO   EXPOSURE TO TB NO   GENITAL HERPES    NO   PARVOVIRUS B-19 NO   CHICKEN POX  YES   MEASLES NO   STD NO   HIV NO   OTHER RASH OR VIRAL ILLNESS SINCE LMP NO   UTI RECURRENT NO   HPV NO      OB History    Para Term  AB Living   6 2 2   3 1   SAB TAB Ectopic Molar Multiple Live Births     2 1       1       # Outcome Date GA Lbr Herson/2nd Weight Sex Delivery Anes PTL Lv   6 Current                     5 TAB 2019                   4 2019                   3 2019                   2 Term 17 37w2d   6 lb 9 oz (2.977 kg) F Vag-Spont EPI N DARIUS   1 Term 16 38w5d   6 lb 10 oz (3.005 kg) F Vag-Spont EPI N FD      PAST GYNECOLOGICAL  HISTORY:  Positive for abnormal pap smears. Doesn't know the grade  Negative for cervical LEEP / conization /cryosurgery.    Negative for uterine surgery. Negative for ovarian or tubal surgery.      Past Medical History:   Diagnosis Date    Abnormal Pap smear of cervix      will repeat after delivery    Gestational diabetes mellitus (GDM) affecting pregnancy, antepartum 2020         Past Surgical History:   Procedure Laterality Date    DILATION AND CURETTAGE        X 2    MASTOIDECTOMY        TYMPANOSTOMY TUBE PLACEMENT          No Known Allergies     Current Outpatient Medications:     vitamin B-6 (PYRIDOXINE) 50 MG tablet, Take 50 mg by mouth daily    aspirin 81 MG chewable tablet, Take 81 mg by mouth daily     diphenhydrAMINE HCl, Sleep, (UNISOM SLEEPMELTS PO), Take by mouth    Prenatal Vit-Fe Fumarate-FA (PRENATAL PO), Take by mouth     Social History      Tobacco Use    Smoking status: Former Smoker       Packs/day: 0.20       Types: Cigarettes       Last attempt to quit: 2014       Years since quittin.4    Smokeless tobacco: Never Used   Substance Use Topics    Alcohol use:  No      FAMILY MEDICAL HISTORY: Negative for congenital abnormalities, autism, genetic disease and mental retardation, not listed above.      Review of Systems :   CONSTITUTIONAL : No fever, no chills   HEENT : No headache, no visual changes, no rhinorrhea, no sore throat   CARDIOVASCULAR : No pain, no palpitations, no edema   RESPIRATORY : No pain, no shortness of breath   GASTROINTESTINAL : No N/V, no D/C, no abdominal pain   GENITOURINARY : No dysuria, hematuria and no incontinence   MUSCULOSKELETAL : No myalgia, No back pain  NEUROLOGICAL : No numbness, no tingling, no tremors. No history of seizures  ALL OTHER SYSTEMS WERE REPORTED AS NEGATIVE.     PERTINENT PHYSICAL EXAMINATION:   /77   Pulse 123   Temp 98 °F (36.7 °C)   Ht 5' 4\" (1.626 m)   Wt 202 lb (91.6 kg)   LMP 03/26/2020   BMI 34.67 kg/m²   Urine dipstick:   Trace for Glucose    1+ for Albumin     GENERAL:   The patient is a well developed, female who is alert cooperative and oriented times three in no acute distress.     HEENT:  Normo cephalic and atraumatic. No facial edema.      ABDOMEN:   Her uterus is gravid. She had no complaint of abdominal pain or tenderness. The fetal heart rate is 151 bpm.      PELVIC EXAMINATION:  Transvaginal ultrasound assessment of the cervix was performed. The cervical length was 52.7 mm, without funneling of the amniotic membranes.       EXTREMITIES:  No peripheral edema is noted.      A fetal ultrasound assessment was performed today. A report is enclosed for your review.     IMPRESSION:    1.  IUP at 28 weeks 0 days gestation based on her Estimated Date of Delivery: 12/31/20    2.  Gestational diabetes versus type 2 diabetes. 3.  History of a prior stillbirth at 37 weeks 5 days gestational age    3.  History of 2 spontaneous first trimester pregnancy losses    5.  History of ETOP for suspected trisomy 13 ([facility] analysis), not confirmed on the final karyotype.     6.  First trimester NT measurement was at the 65th growth percentile 7.  First trimester nasal bone visualization    8.  [place] screen showed no increased risk for fetal aneuploidy for the chromosomes assessed. 9.  Subchorionic hematoma, watsajtp04/8/2020    10. Normal cervical length 10/8/2020     PLAN:  She is to count fetal movement and call if she notices any subjective decrease in fetal movement or has less than 10 major fetal movements in one hour.     /I advised the patient to continue to do home glucose monitoring. She is to check her blood sugars fasting and 2 hours after each meal. Her fasting blood sugars should be less than 92 mg/dl and her 2 hour post prandial blood sugars less than 120 mg/dl. She is to call if her blood sugars are outside of these parameters.     The patient was advised to call if she has any increased vaginal discharge, vaginal bleeding, contractions, abdominal pain, back pain or any new significant symptomatology prior to her next visit. I advised her that these are signs and symptoms of cervical change and require follow-up assessment when they occur.     I requested the patient return for a follow-up assessment in 2 weeks unless there is a clinical reason for her to return prior to that time. She is to call if she has any problems or questions prior to her next visit. Further evaluation and management will be dependent on her clinical presentation and the results of her testing.      The patient is to continue to follow with you in your office for ongoing obstetric care.     I spent 16 minutes of direct contact time with the patient of which greater than 50% of the time was to discuss complications and problems related to her pregnancy.  I discussed my recommendations for management of her blood sugars during pregnancy.  We reviewed the results of her ultrasound assessment performed today.  I discussed my recommendations for ongoing evaluation and management of her pregnancy.  I answered all of her questions to her satisfaction.  I asked her to call if she had any additional questions prior to her next visit.       If you have any questions regarding her management, please contact me at your convenience and thank you for allowing me to participate in her care.     Sincerely,           Cici Mchugh MD, [address], Juan José Bal, [address] [facility], [address] [place] [address] Kaylan, [facility]  Director [address] [place] [address]  [phone]

## 2020-10-09 LAB
GLUCOSE URINE, POC: NORMAL
PROTEIN UA: NEGATIVE

## 2020-10-15 ENCOUNTER — ROUTINE PRENATAL (OUTPATIENT)
Dept: OBGYN CLINIC | Age: 30
End: 2020-10-15
Payer: COMMERCIAL

## 2020-10-15 VITALS
BODY MASS INDEX: 35.19 KG/M2 | HEART RATE: 121 BPM | SYSTOLIC BLOOD PRESSURE: 106 MMHG | TEMPERATURE: 97.2 F | WEIGHT: 205 LBS | DIASTOLIC BLOOD PRESSURE: 72 MMHG

## 2020-10-15 PROCEDURE — 76815 OB US LIMITED FETUS(S): CPT | Performed by: OBSTETRICS & GYNECOLOGY

## 2020-10-15 PROCEDURE — 76820 UMBILICAL ARTERY ECHO: CPT | Performed by: OBSTETRICS & GYNECOLOGY

## 2020-10-15 PROCEDURE — 76819 FETAL BIOPHYS PROFIL W/O NST: CPT | Performed by: OBSTETRICS & GYNECOLOGY

## 2020-10-15 PROCEDURE — 99213 OFFICE O/P EST LOW 20 MIN: CPT | Performed by: OBSTETRICS & GYNECOLOGY

## 2020-10-15 NOTE — PROGRESS NOTES
Pt is 29w0d today. She states feeling pelvic pain but denies any leaking of fluid or vaginal bleeding. Perceives good fetal movement.

## 2020-10-20 ENCOUNTER — ROUTINE PRENATAL (OUTPATIENT)
Dept: OBGYN CLINIC | Age: 30
End: 2020-10-20
Payer: COMMERCIAL

## 2020-10-20 VITALS
WEIGHT: 203 LBS | SYSTOLIC BLOOD PRESSURE: 109 MMHG | HEART RATE: 106 BPM | BODY MASS INDEX: 34.84 KG/M2 | DIASTOLIC BLOOD PRESSURE: 75 MMHG

## 2020-10-20 PROCEDURE — 99999 PR OFFICE/OUTPT VISIT,PROCEDURE ONLY: CPT | Performed by: OBSTETRICS & GYNECOLOGY

## 2020-10-20 PROCEDURE — 81002 URINALYSIS NONAUTO W/O SCOPE: CPT | Performed by: OBSTETRICS & GYNECOLOGY

## 2020-10-20 PROCEDURE — 59025 FETAL NON-STRESS TEST: CPT | Performed by: OBSTETRICS & GYNECOLOGY

## 2020-10-20 PROCEDURE — 99213 OFFICE O/P EST LOW 20 MIN: CPT | Performed by: OBSTETRICS & GYNECOLOGY

## 2020-10-21 LAB
GLUCOSE URINE, POC: NEGATIVE
PROTEIN UA: NEGATIVE

## 2020-10-22 ENCOUNTER — ROUTINE PRENATAL (OUTPATIENT)
Dept: OBGYN | Age: 30
End: 2020-10-22
Payer: COMMERCIAL

## 2020-10-22 VITALS
WEIGHT: 204 LBS | HEART RATE: 115 BPM | DIASTOLIC BLOOD PRESSURE: 68 MMHG | SYSTOLIC BLOOD PRESSURE: 125 MMHG | BODY MASS INDEX: 35.02 KG/M2

## 2020-10-22 LAB
GLUCOSE URINE, POC: NEGATIVE
PROTEIN UA: ABNORMAL

## 2020-10-22 PROCEDURE — 99213 OFFICE O/P EST LOW 20 MIN: CPT | Performed by: OBSTETRICS & GYNECOLOGY

## 2020-10-22 PROCEDURE — 81002 URINALYSIS NONAUTO W/O SCOPE: CPT | Performed by: OBSTETRICS & GYNECOLOGY

## 2020-10-22 PROCEDURE — 0502F SUBSEQUENT PRENATAL CARE: CPT | Performed by: OBSTETRICS & GYNECOLOGY

## 2020-10-22 NOTE — PROGRESS NOTES
Patient alert and pleasant with no complaints. Here today for prenatal visit. Fetal heart tones obtained without difficulty. Urine for glucose obtained with negative results. Urine for protein obtained with +1 results. Discharge instructions have been discussed with the patient. Patient advised to call our office with any questions or concerns. Voiced understanding.

## 2020-10-22 NOTE — PROGRESS NOTES
Preet Delacruz    Patient presents for routine prenatal visit today at 30w0d. Patient GDM, diet controlled. Follows with MFM. Next appointment is tomorrow. Denies complaints  Denies VB, or cramping  Feeling movement at this time. Reviewed above. Fetal Heart Rate: 145    All questions and concerns addressed at this time      ICD-10-CM    1. Thrombophilia affecting pregnancy in third trimester, antepartum ([facility]  O99.113     D68.59    2. Recurrent pregnancy loss in pregnant patient in third trimester, antepartum  O26.23    3. Gestational diabetes mellitus (GDM) affecting pregnancy, antepartum  O24.419    4. Prenatal care in second trimester  Z34.92    5. History of stillbirth in pregnant patient in third trimester, antepartum  O09.293    6. Heterozygous MTHFR mutation P1552A ([facility]  E72.12         Return in about 2 weeks (around 11/5/2020) for OB visit with NST.     Hua Narayanan MD

## 2020-10-23 ENCOUNTER — ROUTINE PRENATAL (OUTPATIENT)
Dept: OBGYN CLINIC | Age: 30
End: 2020-10-23
Payer: COMMERCIAL

## 2020-10-23 VITALS
BODY MASS INDEX: 35 KG/M2 | WEIGHT: 205 LBS | HEART RATE: 101 BPM | SYSTOLIC BLOOD PRESSURE: 117 MMHG | HEIGHT: 64 IN | TEMPERATURE: 97.1 F | DIASTOLIC BLOOD PRESSURE: 75 MMHG

## 2020-10-23 PROBLEM — O47.00 PREMATURE UTERINE CONTRACTIONS CAUSING THREATENED PREMATURE LABOR: Status: ACTIVE | Noted: 2020-10-23

## 2020-10-23 PROBLEM — Z3A.30 30 WEEKS GESTATION OF PREGNANCY: Status: ACTIVE | Noted: 2020-10-23

## 2020-10-23 PROCEDURE — 76817 TRANSVAGINAL US OBSTETRIC: CPT | Performed by: OBSTETRICS & GYNECOLOGY

## 2020-10-23 PROCEDURE — 76805 OB US >/= 14 WKS SNGL FETUS: CPT | Performed by: OBSTETRICS & GYNECOLOGY

## 2020-10-23 PROCEDURE — 99213 OFFICE O/P EST LOW 20 MIN: CPT | Performed by: OBSTETRICS & GYNECOLOGY

## 2020-10-23 PROCEDURE — 76820 UMBILICAL ARTERY ECHO: CPT | Performed by: OBSTETRICS & GYNECOLOGY

## 2020-10-23 PROCEDURE — 81002 URINALYSIS NONAUTO W/O SCOPE: CPT | Performed by: OBSTETRICS & GYNECOLOGY

## 2020-10-23 PROCEDURE — 76818 FETAL BIOPHYS PROFILE W/NST: CPT | Performed by: OBSTETRICS & GYNECOLOGY

## 2020-10-23 NOTE — LETTER
The patient had a stillbirth with her first pregnancy at 45 weeks 5 days gestational age. She stated that no specific cause was determined. She had no history of medical or surgical disease. She had no history of trauma. She stated that she had decreased fetal movement on the Sunday prior to the Monday on which the baby was confirmed to be demised. No autopsy was performed. The patient stated that the baby's karyotype was normal, however the fetal skin biopsy sample was reported as not able to have a karyotype completed.      The results of [place] screen were negative. I advised her that this a screening test not a diagnostic test. I advised her that the test screens only for trisomy 21, 18 and 13. We discussed the sensitivity of the test which I advised the patient is as follows:       99.99% for detection of trisomy 21 with a specificity of 99.9%     99.99% for trisomy 25 with a specificity of 99.6%     99.99% for trisomy 15 with a specificity of 99.7%      She understands that the [facility] testing does not replace diagnostic genetic testing. She understands the limitations of this testing, including, but not limited to, not detecting partial fetal karyotype abnormalities, and in some cases, limited information regarding unbalanced translocations.  The test is also limited in that the results may not reflect chromosomes of the fetus due to confined placental mosaicism or chromosomal changes in the mother.  The test is validated for single and twin pregnancies with a gestational age of at least 9 weeks.  Chromosomal mosaicism cannot be distinguished, and in some cases, not detected by this method.  A negative test does not eliminate the possibility of fetal chromosome abnormalities in regions tested or and other areas of the genome.  The tests cannot rule out other genetic diseases or birth defects, including open neural tube defects.     A fetal ultrasound evaluation was performed today and a detailed report GENITOURINARY : No dysuria, hematuria and no incontinence   MUSCULOSKELETAL : No myalgia, No back pain  NEUROLOGICAL : No numbness, no tingling, no tremors. No history of seizures  ALL OTHER SYSTEMS WERE REPORTED AS NEGATIVE.     PERTINENT PHYSICAL EXAMINATION:   /75   Pulse 101   Temp 97.1 °F (36.2 °C) (Temporal)   Ht 5' 4\" (1.626 m)   Wt 205 lb (93 kg)   LMP 03/26/2020   BMI 35.19 kg/m²   Urine dipstick:   Negative for Glucose    Trace for Albumin     GENERAL:   The patient is a well developed, female who is alert cooperative and oriented times three in no acute distress.     HEENT:  Normo cephalic and atraumatic. No facial edema.      ABDOMEN:   Her uterus is gravid. She had no complaint of abdominal pain or tenderness. The fetal heart rate is 133 bpm.      PELVIC EXAMINATION:  Transvaginal ultrasound assessment of the cervix was performed. The cervical length was within normal limits, without funneling of the amniotic membranes.       EXTREMITIES:  No peripheral edema is noted.      A fetal ultrasound assessment was performed today. A report is enclosed for your review.     IMPRESSION:    1.  IUP at 30 weeks 1 days gestation based on her Estimated Date of Delivery: 12/31/20    2.  Gestational diabetes versus type 2 diabetes. 3.  History of a prior stillbirth at 37 weeks 5 days gestational age    3.  History of 2 spontaneous first trimester pregnancy losses    5.  History of ETOP for suspected trisomy 13 ([address] analysis), not confirmed on the final karyotype. 6.  First trimester NT measurement was at the 65th growth percentile    7.  First trimester nasal bone visualization    8.  [place] screen showed no increased risk for fetal aneuploidy for the chromosomes assessed. 9.  Subchorionic hematoma, [id number]/8/2020    10. Normal cervical length 10/23/2020  11.   Estimated fetal weight was at the 61st growth percentile October 23, 2020 12.  Reassuring biophysical profile and cord Doppler testing 2020  13. Uterine contractions without cervical change 2020  14. Reactive nonstress test 2020    PLAN:  I would recommend that the patient count fetal movements and call if she notices any subjective decrease in fetal movements, particularly if there are less than 10 major movements in an hour. Non-stress testing should be performed every 3 to 4 days through the balance of the pregnancy. Serial ultrasounds to assess fetal anatomy and growth should be performed. The patient is at increase risk for  morbidity and mortality secondary to her history. Weekly BPP and cord Doppler testing should be performed, unless there is a clinical indication to perform the testing more frequently.     /I advised the patient to continue to do home glucose monitoring. She is to check her blood sugars fasting and 2 hours after each meal. Her fasting blood sugars should be less than 92 mg/dl and her 2 hour post prandial blood sugars less than 120 mg/dl. She is to call if her blood sugars are outside of these parameters.     The patient was advised to call if she has any increased vaginal discharge, vaginal bleeding, contractions, abdominal pain, back pain or any new significant symptomatology prior to her next visit. I advised her that these are signs and symptoms of cervical change and require follow-up assessment when they occur.     I requested the patient return for a follow-up assessment in 3 days unless there is a clinical reason for her to return prior to that time. She is to call if she has any problems or questions prior to her next visit.  Further evaluation and management will be dependent on her clinical presentation and the results of her testing.      The patient is to continue to follow with you in your office for ongoing obstetric care.     The total time in minutes spent with the patient, reviewing medical records, reviewing imaging studies, performing ultrasonic imaging, reviewing laboratory testing, and documenting information was 16 minutes, of which, 50% of the time was spent in patient education, counseling, and coordinating care with the patient and/or her family. Darylene Silos discussed my recommendations for management of her blood sugars during pregnancy. We reviewed the results of her ultrasound assessment and fetal testing performed today.  I discussed my recommendations for ongoing evaluation and management of her pregnancy.  I answered all of her questions to her satisfaction.  I asked her to call if she had any additional questions prior to her next visit.       If you have any questions regarding her management, please contact me at your convenience and thank you for allowing me to participate in her care.     Sincerely,           Nella Melendez MD, [address], Dot Osorio, [address] [address] Tori Garcia, [facility]  Director [address] [place], [place] [phone]

## 2020-10-23 NOTE — PROGRESS NOTES
States baby is active Denies bleeding leakage of fluid or contractions. Grady browning  Kick count dailynst test started @7528  [id number]  Mom marking fetal movement  Moderate variability noted, accelerations noted  Reviewed ended @12:33  Call your obstetrician for:    Bleeding, leakage of fluid, abdominal tenderness, headaches, burred vision or  epigastric pain or decreased fetal movement or increased in urinary frequency.    Call if any questions or concerns

## 2020-10-25 NOTE — PROGRESS NOTES
Office Visit 10/15/20    [facility] MATERNAL FETAL MEDICINE  [address] [place] [address].  [facility] [address] [place], [place]. [address]  Ph: [phone] Fax: [phone]  October 15, 2020    RE: Michelle Shay  86  Dear Dr. Jorge Civil :       She was seen in our office today for  testing  REASON FOR CONSULTATION:   31yo [address] [id number] @ 29wEGA   Gestational Diabetes  o24.41 - Diet Controlled  High risk pregnancy o09.89  Obesity o99.21  Poor OB History o09.29 - term stillborn, multiple losses     She reports good fetal movement and no bleeding or fluid leaking. An ultrasound evaluation was done today. Please refer to the enclosed copy of the ultrasound report for further detailed information. ULTRASOUND IMPRESSION:    ? Within developmental and technical limits of ultrasound assessment,   Vertex fetus @  29w   0d. Active, responsive baby. The amniotic fluid volume appears normal.   The biophysical profile is reassuring with a score of 8/8. Umbilical artery Doppler studies are reassuring with good end-diastolic flow.  ~~Overall encouraging report today  RECOMMENDATIONS:  She is to call if she has any problems or questions prior to her next visit. Further evaluation and management will be dependent on her clinical presentation and the results of her testing. The patient is to continue to follow with you in your office for ongoing obstetric care. Sincerely,  Ceferino Dickens MD  The majority of time was spent on counseling and coordination of care with the patient and/or family members. The approximate physician interaction time 16 minutes.

## 2020-10-26 LAB
GLUCOSE URINE, POC: NEGATIVE
PROTEIN UA: NEGATIVE

## 2020-10-27 ENCOUNTER — ROUTINE PRENATAL (OUTPATIENT)
Dept: OBGYN CLINIC | Age: 30
End: 2020-10-27
Payer: COMMERCIAL

## 2020-10-27 VITALS
HEIGHT: 64 IN | SYSTOLIC BLOOD PRESSURE: 115 MMHG | HEART RATE: 93 BPM | TEMPERATURE: 97.9 F | DIASTOLIC BLOOD PRESSURE: 75 MMHG | BODY MASS INDEX: 35.34 KG/M2 | WEIGHT: 207 LBS

## 2020-10-27 PROCEDURE — 76818 FETAL BIOPHYS PROFILE W/NST: CPT | Performed by: OBSTETRICS & GYNECOLOGY

## 2020-10-27 PROCEDURE — 76820 UMBILICAL ARTERY ECHO: CPT | Performed by: OBSTETRICS & GYNECOLOGY

## 2020-10-27 PROCEDURE — 99212 OFFICE O/P EST SF 10 MIN: CPT | Performed by: OBSTETRICS & GYNECOLOGY

## 2020-10-27 PROCEDURE — 81002 URINALYSIS NONAUTO W/O SCOPE: CPT | Performed by: OBSTETRICS & GYNECOLOGY

## 2020-10-27 PROCEDURE — 99213 OFFICE O/P EST LOW 20 MIN: CPT | Performed by: OBSTETRICS & GYNECOLOGY

## 2020-10-27 PROCEDURE — 76817 TRANSVAGINAL US OBSTETRIC: CPT | Performed by: OBSTETRICS & GYNECOLOGY

## 2020-10-27 NOTE — PATIENT INSTRUCTIONS
have a tour of your hospital's labor and delivery unit to get a better idea of what to expect while you are in the hospital.  During these last months, it is very important to take good care of yourself and pay attention to what your body needs. If your doctor says it is okay for you to work, don't push yourself too hard. Use the tips provided in this care sheet to ease heartburn and care for varicose veins. If you haven't already had the Tdap shot during this pregnancy, talk to your doctor about getting it. It will help protect your  against pertussis infection. Follow-up care is a key part of your treatment and safety. Be sure to make and go to all appointments, and call your doctor if you are having problems. It's also a good idea to know your test results and keep a list of the medicines you take. How can you care for yourself at home? Pay attention to your baby's movements  · You should feel your baby move several times every day. · Your baby now turns less, and kicks and jabs more. · Your baby sleeps 20 to 45 minutes at a time and is more active at certain times of day. · If your doctor wants you to count your baby's kicks:  ? Empty your bladder, and lie on your side or relax in a comfortable chair. ? Write down your start time. ? Pay attention only to your baby's movements. Count any movement except hiccups. ? After you have counted 10 movements, write down your stop time. ? Write down how many minutes it took for your baby to move 10 times. ? If an hour goes by and you have not recorded 10 movements, have something to eat or drink and then count for another hour. If you do not record 10 movements in either hour, call your doctor. Ease heartburn  · Eat small, frequent meals. · Do not eat chocolate, peppermint, or very spicy foods. Avoid drinks with caffeine, such as coffee, tea, and sodas. · Avoid bending over or lying down after meals. · Talk a short walk after you eat.   · If heartburn is a problem at night, do not eat for 2 hours before bedtime. · Take antacids like Mylanta, Maalox, Rolaids, or Tums. Do not take antacids that have sodium bicarbonate. Care for varicose veins  · Varicose veins are blood vessels that stretch out with the extra blood during pregnancy. Your legs may ache or throb. Most varicose veins will go away after the birth. · Avoid standing for long periods of time. Sit with your legs crossed at the ankles, not the knees. · Sit with your feet propped up. · Avoid tight clothing or stockings. Wear support hose. · Exercise regularly. Try walking for at least 30 minutes a day. Where can you learn more? Go to https://The Health Wagon.BioElectronics. org and sign in to your remocean account. Enter P321 in the [employer] box to learn more about \"Weeks 30 to 32 of Your Pregnancy: Care Instructions. \"     If you do not have an account, please click on the \"Sign Up Now\" link. Current as of: February 11, 2020               Content Version: 12.6  © 9866-4254 [employer]. Care instructions adapted under license by [facility] ([facility]). If you have questions about a medical condition or this instruction, always ask your healthcare professional. [place] [address] any warranty or liability for your use of this information. Patient Education        Learning About When to Call Your Doctor During Pregnancy (After 20 Weeks)  Your Care Instructions  It's common to have concerns about what might be a problem during pregnancy. Although most pregnant women don't have any serious problems, it's important to know when to call your doctor if you have certain symptoms or signs of labor. These are general suggestions. Your doctor may give you some more information about when to call. When to call your doctor (after 20 weeks)  Call 911 anytime you think you may need emergency care. For example, call if:  · You have severe vaginal bleeding.   · You have sudden, severe pain in your belly. · You passed out (lost consciousness). · You have a seizure. · You see or feel the umbilical cord. · You think you are about to deliver your baby and can't make it safely to the hospital.  Call your doctor now or seek immediate medical care if:  · You have vaginal bleeding. · You have belly pain. · You have a fever. · You have symptoms of preeclampsia, such as:  ? Sudden swelling of your face, hands, or feet. ? New vision problems (such as dimness, blurring, or seeing spots). ? A severe headache. · You have a sudden release of fluid from your vagina. (You think your water broke.)  · You think that you may be in labor. This means that you've had at least 6 contractions in an hour. · You notice that your baby has stopped moving or is moving much less than normal.  · You have symptoms of a urinary tract infection. These may include:  ? Pain or burning when you urinate. ? A frequent need to urinate without being able to pass much urine. ? Pain in the flank, which is just below the rib cage and above the waist on either side of the back. ? Blood in your urine. Watch closely for changes in your health, and be sure to contact your doctor if:  · You have vaginal discharge that smells bad. · You have skin changes, such as:  ? A rash. ? Itching. ? Yellow color to your skin. · You have other concerns about your pregnancy. If you have labor signs at 37 weeks or more  If you have signs of labor at 37 weeks or more, your doctor may tell you to call when your labor becomes more active. Symptoms of active labor include:  · Contractions that are regular. · Contractions that are less than 5 minutes apart. · Contractions that are hard to talk through. Follow-up care is a key part of your treatment and safety. Be sure to make and go to all appointments, and call your doctor if you are having problems.  It's also a good idea to know your test results and keep a list of the medicines you take. Where can you learn more? Go to https://chpepiceweb.healthAeroDynEnergy. org and sign in to your [employer] account. Enter  in the [facility] box to learn more about \"Learning About When to Call Your Doctor During Pregnancy (After 20 Weeks). \"     If you do not have an account, please click on the \"Sign Up Now\" link. Current as of: February 11, 2020               Content Version: 12.6  © 2006-2020 [employer]. Care instructions adapted under license by [facility] ([facility]). If you have questions about a medical condition or this instruction, always ask your healthcare professional. [place] [address] any warranty or liability for your use of this information. Patient Education        Counting Your Baby's Kicks: Care Instructions  Your Care Instructions     Counting your baby's kicks is one way your doctor can tell that your baby is healthy. Most women--especially in a first pregnancy--feel their baby move for the first time between 16 and 22 weeks. The movement may feel like flutters rather than kicks. Your baby may move more at certain times of the day. When you are active, you may notice less kicking than when you are resting. At your prenatal visits, your doctor will ask whether the baby is active. In your last trimester, your doctor may ask you to count the number of times you feel your baby move. Follow-up care is a key part of your treatment and safety. Be sure to make and go to all appointments, and call your doctor if you are having problems. It's also a good idea to know your test results and keep a list of the medicines you take. How do you count fetal kicks? · A common method of checking your baby's movement is to count the number of kicks or moves you feel in 1 hour. Ten movements (such as kicks, flutters, or rolls) in 1 hour are normal. Some doctors suggest that you count in the morning until you get to 10 movements.  Then you can quit for that day and start again the next day. · Pick your baby's most active time of day to count. This may be any time from morning to evening. · If you do not feel 10 movements in an hour, your baby may be sleeping. Wait for the next hour and count again. When should you call for help? Call your doctor now or seek immediate medical care if:    · You noticed that your baby has stopped moving or is moving much less than normal.   Watch closely for changes in your health, and be sure to contact your doctor if you have any problems. Where can you learn more? Go to https://Cleverpepiceweb.PopUpsters. org and sign in to your [employer] account. Enter V280 in the [employer] box to learn more about \"Counting Your Baby's Kicks: Care Instructions. \"     If you do not have an account, please click on the \"Sign Up Now\" link. Current as of: February 11, 2020               Content Version: 12.6  © 2006-2020 [employer]. Care instructions adapted under license by [facility] ([facility]). If you have questions about a medical condition or this instruction, always ask your healthcare professional. [address] any warranty or liability for your use of this information. Patient Education        Diabetes Blood Sugar Emergencies: Your Action Plan  How can you prevent a blood sugar emergency? An important part of living with diabetes is keeping your blood sugar in your target range. You'll need to know what to do if it's too high or too low. Managing your blood sugar levels helps you avoid emergencies. This care sheet will teach you about the signs of high and low blood sugar. It will help you make an action plan with your doctor for when these signs occur. Low blood sugar is more likely to happen if you take certain medicines for diabetes.  It can also happen if you skip a meal, drink alcohol, or exercise more than usual.  You may get high blood sugar if you eat differently than you normally do. One example is eating more carbohydrate than usual. Having a cold, the flu, or other sudden illness can also cause high blood sugar levels. Levels can also rise if you miss a dose of medicine. Any change in how you take your medicine may affect your blood sugar level. So it's important to work with your doctor before you make any changes. Check your blood sugar  Work with your doctor to fill in the blank spaces below that apply to you. Track your levels, know your target range, and write down ways you can get your blood sugar back in your target range. A log book can help you track your levels. Take the book to all of your medical appointments. · Check your blood sugar _____ times a day, at these times:________________________________________________. (For example: Before meals, at bedtime, before exercise, during exercise, other.)  · Your blood sugar target range before a meal is ___________________. Your blood sugar target range after a meal is _______________________. · Do this--___________________________________________________--to get your blood sugar back within your safe range if your blood sugar results are _________________________________________. (For example: Less than 70 or above 250 mg/dL.)  Call your doctor when your blood sugar results are ___________________________________. (For example: Less than 70 or above 250 mg/dL.)  What are the symptoms of low and high blood sugar? Common symptoms of low blood sugar are sweating and feeling shaky, weak, hungry, or confused. Symptoms can start quickly. Common symptoms of high blood sugar are feeling very thirsty or very hungry. You may also pass urine more often than usual. You may have blurry vision and may lose weight without trying. But some people may have high or low blood sugar without having any symptoms. That's a good reason to check your blood sugar on a regular schedule. What should you do if you have symptoms?   Work with more or less medicine. · Give yourself insulin if your doctor has prescribed it for high blood sugar. · Test for ketones, if the doctor told you to do so. If the results of the ketone test show a moderate-to-large amount of ketones, call the doctor for advice. · Wait 30 minutes after you take the extra insulin or the missed medicine. Check your blood sugar again. If your symptoms or blood sugar levels are getting worse or have not improved after taking these steps, seek medical care right away. Follow-up care is a key part of your treatment and safety. Be sure to make and go to all appointments, and call your doctor if you are having problems. It's also a good idea to know your test results and keep a list of the medicines you take. Where can you learn more? Go to https://Acticut Internationalpefelibertoeb.Vtion Wireless Technology. org and sign in to your [employer] account. Enter N041 in the [employer] box to learn more about \"Diabetes Blood Sugar Emergencies: Your Action Plan. \"     If you do not have an account, please click on the \"Sign Up Now\" link. Current as of: December 20, 2019               Content Version: 12.6  © 0659-3484 [employer]. Care instructions adapted under license by [facility] ([facility]). If you have questions about a medical condition or this instruction, always ask your healthcare professional. [address] any warranty or liability for your use of this information.

## 2020-10-27 NOTE — PROGRESS NOTES
Pt denies bleeding,lof,ctxPt states blood sugars wnl. Pt states good fetal movement. Kick counts encouraged. All questions answered+ information confirmed by pt. Nst completed. Baseline 130  with moderate variabilty+ accelelrations. irreg ctx noted. Dr Anum Villanueva notified.  Cervical length done  Reactive per dr Antoinette Guevara

## 2020-10-27 NOTE — LETTER
10/27/20    Umang Merida, [address] [facility]  [place], [address]                RE: Negra Kwong  : 1990   AGE: 26 y.o.     This report has been created using voice recognition software. It may contain errors which are inherent in voice recognition technology.     Dear Dr. 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 o. female, who is G6(2,0,3,1). She has an Estimated Date of Delivery: 20 based on her LMP and previous ultrasound assessment. Angelia Coles is currently 30 weeks 5 days gestation based on that assessment.      The patient was having uterine contractions. Her fetal movement has been good. She has had no increased vaginal discharge, vaginal bleeding, back pain, dysuria, hematuria, or leaking of amniotic fluid.     I reviewed the patient's home blood sugar monitoring results. Her fasting blood sugars have been intermittently elevated.   Her postprandial blood sugars are generally less than 120 unless she deviates from her carbohydrate controlled diet.      The patient had 2 prior spontaneous first trimester pregnancy losses.  One of the pregnancies had no yolk sac or embryo identified.  She had a positive pregnancy test which subsequently became negative.     The patient terminated a pregnancy in the first trimester as a result of suspected trisomy 13 based on a chorionic villous sampling.  When the full karyotype analysis was completed it was found that this fetus actually had a normal karyotype.    The patient had a stillbirth with her first pregnancy at 45 weeks 5 days gestational age. She stated that no specific cause was determined. She had no history of medical or surgical disease. She had no history of trauma. She stated that she had decreased fetal movement on the Sunday prior to the Monday on which the baby was confirmed to be demised. No autopsy was performed. The patient stated that the baby's karyotype was normal, however the fetal skin biopsy sample was reported as not able to have a karyotype completed.      The results of [place] screen were negative. I advised her that this a screening test not a diagnostic test. I advised her that the test screens only for trisomy 21, 18 and 13. We discussed the sensitivity of the test which I advised the patient is as follows:       99.99% for detection of trisomy 21 with a specificity of 99.9%     99.99% for trisomy 25 with a specificity of 99.6%     99.99% for trisomy 15 with a specificity of 99.7%      She understands that the [facility] testing does not replace diagnostic genetic testing. She understands the limitations of this testing, including, but not limited to, not detecting partial fetal karyotype abnormalities, and in some cases, limited information regarding unbalanced translocations.  The test is also limited in that the results may not reflect chromosomes of the fetus due to confined placental mosaicism or chromosomal changes in the mother. The test is validated for single and twin pregnancies with a gestational age of at least 9 weeks.  Chromosomal mosaicism cannot be distinguished, and in some cases, not detected by this method.  A negative test does not eliminate the possibility of fetal chromosome abnormalities in regions tested or and other areas of the genome.  The tests cannot rule out other genetic diseases or birth defects, including open neural tube defects.     A fetal ultrasound evaluation was performed on October 23, 2020.   A detailed report included under the media tab from today's date.  A living bae intrauterine fetus was identified in the cephalic presentation, with normal fetal heart motion and normal fetal motion noted.  The placenta was on the posterior and right lateral aspects of the uterus.  The amniotic fluid index was 14.7 cm.  The estimated fetal weight was at the 61st growth percentile for gestational age. Visualization of the fetal anatomy was somewhat limited secondary to poor acoustic transmission to the patient's anterior abdominal wall in the fetal position.  No apparent gross fetal anatomic abnormalities were identified in the areas which were visualized. The biophysical profile and cord Doppler studies were both reassuring. There was no evidence of absence, or reversal of end-diastolic flow. The amniotic fluid index today was 17.5 cm. Fetus was in the cephalic presentation. The placenta was on the right lateral aspect of the uterus. The biophysical profile and cord Doppler studies were both reassuring.   There was no evidence of absence, or reversal of end-diastolic flow.                              GENETIC SCREENING/TERATOLOGY COUNSELING                          (Includes patient, FTB, and any affected family members)     Patient Age > 35 Years NO   Thalassemia ( MVC<80) NO   Congential Heart Defect NO   Neural Tube Defect NO   Blu-Sachs NO   Sickle Cell Disease NO   Sickle Cell Trait NO   Sickle C Disease or Trait NO   Hemophilia NO   Muscular Dystrophy NO   Cystic Fibrosis NO   Anson Disease NO   Autism NO   Mental Retardation NO   History of Fragile X NO   Maternal Diabetes NO   Other Genetic Disease or Syndrome NO   Previous Child With Congenital Abnormality Not Listed NO   Recreational Drugs NO                                                INFECTION HISTORY          HEPATITIS IMMUNIZED:  YES   HEPATITIS INFECTION:  NO   EXPOSURE TO TB NO   GENITAL HERPES    NO   PARVOVIRUS B-19 NO CHICKEN POX  YES   MEASLES NO   STD NO   HIV NO   OTHER RASH OR VIRAL ILLNESS SINCE LMP NO   UTI RECURRENT NO   HPV NO      OB History    Para Term  AB Living   6 2 2   3 1   SAB TAB Ectopic Molar Multiple Live Births     2 1       1       # Outcome Date GA Lbr Herson/2nd Weight Sex Delivery Anes PTL Lv   6 Current                     5 TAB 2019                   4 2019                   3 2019                   2 Term 17 37w2d   6 lb 9 oz (2.977 kg) F Vag-Spont EPI N DARIUS   1 Term 16 38w5d   6 lb 10 oz (3.005 kg) F Vag-Spont EPI N FD      PAST GYNECOLOGICAL  HISTORY:  Positive for abnormal pap smears. Doesn't know the grade  Negative for cervical LEEP / conization /cryosurgery.    Negative for uterine surgery. Negative for ovarian or tubal surgery.      Past Medical History:   Diagnosis Date    Abnormal Pap smear of cervix      will repeat after delivery    Gestational diabetes mellitus (GDM) affecting pregnancy, antepartum 2020         Past Surgical History:   Procedure Laterality Date    DILATION AND CURETTAGE   2019     X 2    MASTOIDECTOMY        TYMPANOSTOMY TUBE PLACEMENT          No Known Allergies     Current Outpatient Medications:     vitamin B-6 (PYRIDOXINE) 50 MG tablet, Take 50 mg by mouth daily    aspirin 81 MG chewable tablet, Take 81 mg by mouth daily     diphenhydrAMINE HCl, Sleep, (UNISOM SLEEPMELTS PO), Take by mouth    Prenatal Vit-Fe Fumarate-FA (PRENATAL PO), Take by mouth     Social History      Tobacco Use    Smoking status: Former Smoker       Packs/day: 0.20       Types: Cigarettes       Last attempt to quit: 2014       Years since quittin.4    Smokeless tobacco: Never Used   Substance Use Topics    Alcohol use:  No      FAMILY MEDICAL HISTORY:   Negative for congenital abnormalities, autism, genetic disease and mental retardation, not listed above.      Review of Systems :   CONSTITUTIONAL : No fever, no chills HEENT : No headache, no visual changes, no rhinorrhea, no sore throat   CARDIOVASCULAR : No pain, no palpitations, no edema   RESPIRATORY : No pain, no shortness of breath   GASTROINTESTINAL : No N/V, no D/C, no abdominal pain   GENITOURINARY : No dysuria, hematuria and no incontinence   MUSCULOSKELETAL : No myalgia, No back pain  NEUROLOGICAL : No numbness, no tingling, no tremors. No history of seizures  ALL OTHER SYSTEMS WERE REPORTED AS NEGATIVE.     PERTINENT PHYSICAL EXAMINATION:   /75   Pulse 93   Temp 97.9 °F (36.6 °C)   Ht 5' 4\" (1.626 m)   Wt 207 lb (93.9 kg)   LMP 03/26/2020   BMI 35.53 kg/m²   Urine dipstick:   Negative Glucose  Negative Protein     A fetal ultrasound assessment was performed today. A report is enclosed for your review.     IMPRESSION:    1.  IUP at 30 weeks 5 days gestation based on her Estimated Date of Delivery: 12/31/20    2.  Gestational diabetes versus type 2 diabetes.    3.  History of a prior stillbirth at 37 weeks 5 days gestational age    2.  History of 2 spontaneous first trimester pregnancy losses    5.  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    6.  First trimester NT measurement was at the 65th growth percentile    7.  First trimester nasal bone visualization    8.  [place] screen showed no increased risk for fetal aneuploidy for the chromosomes assessed.    9.  Subchorionic hematoma, [id number]/8/2020    10.  Normal cervical length 10/27/2020  11. Estimated fetal weight was at the 61st growth percentile October 23, 2020  12. Reassuring biophysical profile and cord Doppler testing October 27, 2020  13. Uterine contractions without cervical change October 27, 2020  14.   Reactive nonstress test October 27, 2020     PLAN:  I would recommend that the patient count fetal movements and call if she notices any subjective decrease in fetal movements, particularly if there are less than 10 major movements in an hour. Non-stress testing should be performed every 3 to 4 days through the balance of the pregnancy. Serial ultrasounds to assess fetal anatomy and growth should be performed. The patient is at increase risk for  morbidity and mortality secondary to her history. Weekly BPP and cord Doppler testing should be performed, unless there is a clinical indication to perform the testing more frequently.     /I advised the patient to continue to do home glucose monitoring. She is to check her blood sugars fasting and 2 hours after each meal. Her fasting blood sugars should be less than 92 mg/dl and her 2 hour post prandial blood sugars less than 120 mg/dl. She is to call if her blood sugars are outside of these parameters.     The patient was advised to call if she has any increased vaginal discharge, vaginal bleeding, contractions, abdominal pain, back pain or any new significant symptomatology prior to her next visit. I advised her that these are signs and symptoms of cervical change and require follow-up assessment when they occur.     I requested the patient return for a follow-up assessment in 3 days unless there is a clinical reason for her to return prior to that time. She is to call if she has any problems or questions prior to her next visit. Further evaluation and management will be dependent on her clinical presentation and the results of her testing.      The patient is to continue to follow with you in your office for ongoing obstetric care.     The total time in minutes spent with the patient, reviewing medical records, reviewing imaging studies, performing ultrasonic imaging, reviewing laboratory testing, and documenting information was 10 minutes, of which, 50% of the time was spent in patient education, counseling, and coordinating care with the patient and/or her family. Carlos Pritchett discussed my recommendations for management of her blood sugars during pregnancy.  We

## 2020-10-28 LAB
GLUCOSE URINE, POC: NORMAL
PROTEIN UA: NEGATIVE

## 2020-10-29 ENCOUNTER — ROUTINE PRENATAL (OUTPATIENT)
Dept: OBGYN CLINIC | Age: 30
End: 2020-10-29
Payer: COMMERCIAL

## 2020-10-29 VITALS
BODY MASS INDEX: 35 KG/M2 | SYSTOLIC BLOOD PRESSURE: 112 MMHG | HEIGHT: 64 IN | DIASTOLIC BLOOD PRESSURE: 72 MMHG | HEART RATE: 90 BPM | WEIGHT: 205 LBS | TEMPERATURE: 98.1 F

## 2020-10-29 PROBLEM — O36.8390 FETAL HEART RATE DECELERATIONS AFFECTING MANAGEMENT OF MOTHER: Status: ACTIVE | Noted: 2020-10-29

## 2020-10-29 PROCEDURE — 76818 FETAL BIOPHYS PROFILE W/NST: CPT | Performed by: OBSTETRICS & GYNECOLOGY

## 2020-10-29 PROCEDURE — 76817 TRANSVAGINAL US OBSTETRIC: CPT | Performed by: OBSTETRICS & GYNECOLOGY

## 2020-10-29 PROCEDURE — 81002 URINALYSIS NONAUTO W/O SCOPE: CPT | Performed by: OBSTETRICS & GYNECOLOGY

## 2020-10-29 PROCEDURE — 76820 UMBILICAL ARTERY ECHO: CPT | Performed by: OBSTETRICS & GYNECOLOGY

## 2020-10-29 PROCEDURE — 99213 OFFICE O/P EST LOW 20 MIN: CPT | Performed by: OBSTETRICS & GYNECOLOGY

## 2020-10-29 PROCEDURE — 99212 OFFICE O/P EST SF 10 MIN: CPT | Performed by: OBSTETRICS & GYNECOLOGY

## 2020-10-29 NOTE — PROGRESS NOTES
C/o occ ctxPt denies bleeding,lof. Pt states good fetal movement. Kick counts encouraged. Pt states blood sugars wnl. Blood sugar record scanned to pt chart. All questions answered+ information confirmed by pt. Nst completed. Baseline 135  with moderate variabilty+ accelelrations.  Reactive per dr Todd Hernandez

## 2020-10-29 NOTE — PATIENT INSTRUCTIONS
Please arrive for your scheduled appointment at least 15 minutes early with your actual insurance card+ a photo ID. Also if you need any refills ordered or have questions, it may take up 48 hours to reply. Please allow ample time for your refills. Call me when you use last refill. Thank you for your cooperation. Any questions contact Julisa at [phone]. If you are experiencing an emergency and need immediate help, call 911 or go to go emergency room or labor and delivery. if you are sick, not feeling well or have an infectious process going on please reschedule your appointment by calling [phone]. Also if any family members are not feeling well, please do not bring them to your appointment. We appreciate your cooperation. We are doing this in order to protect our pregnant mothers+ their babies. Call your primary obstetrician with bleeding, leaking of fluid, abdominal tenderness, headache, blurry vision, epigastric pain and increased urinary frequency. Do kick counts after dinner. Call your primary obstetrician if less than 10 kicks in 2 hours after dinner. Call your primary obstetrician with bleeding, leaking of fluid, abdominal tenderness, headache, blurry vision, epigastric pain and increased urinary frequency. You might be having an NST at your next appt. Please eat a large snack or breakfast before coming to office. Thank you  Patient Education        Weeks 30 to 28 of Your Pregnancy: Care Instructions  Your Care Instructions     You have made it to the final months of your pregnancy. By now, your baby is really starting to look like a baby, with hair and plump skin. As you enter the final weeks of pregnancy, the reality of having a baby may start to set in. This is the time to settle on a name, get your household in order, set up a safe nursery, and find quality  if needed. Doing these things in advance will allow you to focus on caring for and enjoying your new baby.  You may also want to is a problem at night, do not eat for 2 hours before bedtime. · Take antacids like Mylanta, Maalox, Rolaids, or Tums. Do not take antacids that have sodium bicarbonate. Care for varicose veins  · Varicose veins are blood vessels that stretch out with the extra blood during pregnancy. Your legs may ache or throb. Most varicose veins will go away after the birth. · Avoid standing for long periods of time. Sit with your legs crossed at the ankles, not the knees. · Sit with your feet propped up. · Avoid tight clothing or stockings. Wear support hose. · Exercise regularly. Try walking for at least 30 minutes a day. Where can you learn more? Go to https://Reffpedia.[employer]. org and sign in to your Nagual Sounds account. Enter N314 in the Asteres box to learn more about \"Weeks 30 to 32 of Your Pregnancy: Care Instructions. \"     If you do not have an account, please click on the \"Sign Up Now\" link. Current as of: February 11, 2020               Content Version: 12.6  © 2434-3868 fflick. Care instructions adapted under license by [facility] ([facility]). If you have questions about a medical condition or this instruction, always ask your healthcare professional. [place] [address] any warranty or liability for your use of this information. Patient Education        Learning About When to Call Your Doctor During Pregnancy (After 20 Weeks)  Your Care Instructions  It's common to have concerns about what might be a problem during pregnancy. Although most pregnant women don't have any serious problems, it's important to know when to call your doctor if you have certain symptoms or signs of labor. These are general suggestions. Your doctor may give you some more information about when to call. When to call your doctor (after 20 weeks)  Call 911 anytime you think you may need emergency care. For example, call if:  · You have severe vaginal bleeding.   · You have sudden, severe pain in your belly. · You passed out (lost consciousness). · You have a seizure. · You see or feel the umbilical cord. · You think you are about to deliver your baby and can't make it safely to the hospital.  Call your doctor now or seek immediate medical care if:  · You have vaginal bleeding. · You have belly pain. · You have a fever. · You have symptoms of preeclampsia, such as:  ? Sudden swelling of your face, hands, or feet. ? New vision problems (such as dimness, blurring, or seeing spots). ? A severe headache. · You have a sudden release of fluid from your vagina. (You think your water broke.)  · You think that you may be in labor. This means that you've had at least 6 contractions in an hour. · You notice that your baby has stopped moving or is moving much less than normal.  · You have symptoms of a urinary tract infection. These may include:  ? Pain or burning when you urinate. ? A frequent need to urinate without being able to pass much urine. ? Pain in the flank, which is just below the rib cage and above the waist on either side of the back. ? Blood in your urine. Watch closely for changes in your health, and be sure to contact your doctor if:  · You have vaginal discharge that smells bad. · You have skin changes, such as:  ? A rash. ? Itching. ? Yellow color to your skin. · You have other concerns about your pregnancy. If you have labor signs at 37 weeks or more  If you have signs of labor at 37 weeks or more, your doctor may tell you to call when your labor becomes more active. Symptoms of active labor include:  · Contractions that are regular. · Contractions that are less than 5 minutes apart. · Contractions that are hard to talk through. Follow-up care is a key part of your treatment and safety. Be sure to make and go to all appointments, and call your doctor if you are having problems.  It's also a good idea to know your test results and keep a list of the medicines you take. Where can you learn more? Go to https://chpepiceweb.healthKeypr. org and sign in to your GeeYuu account. Enter  in the [facility] box to learn more about \"Learning About When to Call Your Doctor During Pregnancy (After 20 Weeks). \"     If you do not have an account, please click on the \"Sign Up Now\" link. Current as of: February 11, 2020               Content Version: 12.6  © 2006-2020 MAINtag, [employer]. Care instructions adapted under license by [facility] [employer] [facility] ([facility]). If you have questions about a medical condition or this instruction, always ask your healthcare professional. [place] [address] any warranty or liability for your use of this information. Patient Education        Counting Your Baby's Kicks: Care Instructions  Your Care Instructions     Counting your baby's kicks is one way your doctor can tell that your baby is healthy. Most women--especially in a first pregnancy--feel their baby move for the first time between 16 and 22 weeks. The movement may feel like flutters rather than kicks. Your baby may move more at certain times of the day. When you are active, you may notice less kicking than when you are resting. At your prenatal visits, your doctor will ask whether the baby is active. In your last trimester, your doctor may ask you to count the number of times you feel your baby move. Follow-up care is a key part of your treatment and safety. Be sure to make and go to all appointments, and call your doctor if you are having problems. It's also a good idea to know your test results and keep a list of the medicines you take. How do you count fetal kicks? · A common method of checking your baby's movement is to count the number of kicks or moves you feel in 1 hour. Ten movements (such as kicks, flutters, or rolls) in 1 hour are normal. Some doctors suggest that you count in the morning until you get to 10 movements.  Then you can quit for that day and start again the next day. · Pick your baby's most active time of day to count. This may be any time from morning to evening. · If you do not feel 10 movements in an hour, your baby may be sleeping. Wait for the next hour and count again. When should you call for help? Call your doctor now or seek immediate medical care if:    · You noticed that your baby has stopped moving or is moving much less than normal.   Watch closely for changes in your health, and be sure to contact your doctor if you have any problems. Where can you learn more? Go to https://chpepiceweb.[email or web address] org and sign in to your Sqeeqee account. Enter J696 in the Breadcrumbtracking box to learn more about \"Counting Your Baby's Kicks: Care Instructions. \"     If you do not have an account, please click on the \"Sign Up Now\" link. Current as of: February 11, 2020               Content Version: 12.6  © 2006-2020 PEMRED. Care instructions adapted under license by [facility] ([facility]). If you have questions about a medical condition or this instruction, always ask your healthcare professional. [address] any warranty or liability for your use of this information. Patient Education        Diabetes Blood Sugar Emergencies: Your Action Plan  How can you prevent a blood sugar emergency? An important part of living with diabetes is keeping your blood sugar in your target range. You'll need to know what to do if it's too high or too low. Managing your blood sugar levels helps you avoid emergencies. This care sheet will teach you about the signs of high and low blood sugar. It will help you make an action plan with your doctor for when these signs occur. Low blood sugar is more likely to happen if you take certain medicines for diabetes.  It can also happen if you skip a meal, drink alcohol, or exercise more than usual.  You may get high blood sugar if you eat differently than you your doctor to fill in the blank spaces below that apply to you. Low blood sugar  If you have symptoms of low blood sugar, check your blood sugar. If it's below _____ ( for example, below 70), eat or drink a quick-sugar food that has about 15 grams of carbohydrate. Your goal is to get your level back to your safe range. Check your blood sugar again 15 minutes later. If it's still not in your target range, take another 15 grams of carbohydrate and check your blood sugar again in 15 minutes. Repeat this until you reach your target. Then go back to your regular testing schedule. Children usually need less than 15 grams of carbohydrate. Check with your doctor or diabetes educator for the amount that is right for your child. When you have low blood sugar, it's best to stop or reduce any physical activity until your blood sugar is back in your target range and is stable. If you must stay active, eat or drink 30 grams of carbohydrate. Then check your blood sugar again in 15 minutes. If it's not in your target range, take another 30 grams of carbohydrates. Check your blood sugar again in 15 minutes. Keep doing this until you reach your target. You can then go back to your regular testing schedule. If your symptoms or blood sugar levels are getting worse or have not improved after 15 minutes, seek medical care right away. Here are some examples of quick-sugar foods with 15 grams of carbohydrate:  · 3 or 4 glucose tablets  · 1 tablespoon (3 teaspoons) table sugar  · ½ cup to ¾ cup (4 to 6 ounces) of fruit juice or regular (not diet) soda  · Hard candy (such as 6 Life Savers)  High blood sugar  If you have symptoms of high blood sugar, check your blood sugar. Your goal is to get your level back to your target range. If it's above ______ ( for example, above 250), follow these steps:  · If you missed a dose of your diabetes medicine, take it now. Take only the amount of medicine that you have been prescribed.  Do not take more or less medicine. · Give yourself insulin if your doctor has prescribed it for high blood sugar. · Test for ketones, if the doctor told you to do so. If the results of the ketone test show a moderate-to-large amount of ketones, call the doctor for advice. · Wait 30 minutes after you take the extra insulin or the missed medicine. Check your blood sugar again. If your symptoms or blood sugar levels are getting worse or have not improved after taking these steps, seek medical care right away. Follow-up care is a key part of your treatment and safety. Be sure to make and go to all appointments, and call your doctor if you are having problems. It's also a good idea to know your test results and keep a list of the medicines you take. Where can you learn more? Go to https://bluepulsepefelibertoeb.UWI Technology. org and sign in to your Scientific Digital Imaging (SDI) account. Enter I183 in the Open Range Communications box to learn more about \"Diabetes Blood Sugar Emergencies: Your Action Plan. \"     If you do not have an account, please click on the \"Sign Up Now\" link. Current as of: December 20, 2019               Content Version: 12.6  © 8321-8349 GenoLogics, Incorporated. Care instructions adapted under license by [facility] ([facility]). If you have questions about a medical condition or this instruction, always ask your healthcare professional. [address] any warranty or liability for your use of this information.

## 2020-10-29 NOTE — LETTER
detailed report included under the media tab from today's date.  A living bae intrauterine fetus was identified in the cephalic presentation, with normal fetal heart motion and normal fetal motion noted.  The placenta was on the posterior and right lateral aspects of the uterus.  The amniotic fluid index was 14.7 cm.  The estimated fetal weight was at the 61st growth percentile for gestational age.  Visualization of the fetal anatomy was somewhat limited secondary to poor acoustic transmission to the patient's anterior abdominal wall in the fetal position.  No apparent gross fetal anatomic abnormalities were identified in the areas which were visualized. The biophysical profile and cord Doppler studies were both reassuring. [place] was no evidence of absence, or reversal of end-diastolic flow.     The amniotic fluid index today was 19.1 cm. The fetus was in the cephalic presentation. The placenta was on the right lateral aspect of the uterus. The biophysical profile and cord Doppler studies were both reassuring.   There was no evidence of absence, or reversal of end-diastolic flow.                              GENETIC SCREENING/TERATOLOGY COUNSELING                          (Includes patient, FTB, and any affected family members)     Patient Age > 35 Years NO   Thalassemia ( MVC<80) NO   Congential Heart Defect NO   Neural Tube Defect NO   Blu-Sachs NO   Sickle Cell Disease NO   Sickle Cell Trait NO   Sickle C Disease or Trait NO   Hemophilia NO   Muscular Dystrophy NO   Cystic Fibrosis NO   [place] Disease NO   Autism NO   Mental Retardation NO   History of Fragile X NO   Maternal Diabetes NO   Other Genetic Disease or Syndrome NO   Previous Child With Congenital Abnormality Not Listed NO   Recreational Drugs NO                                                INFECTION HISTORY          HEPATITIS IMMUNIZED:  YES   HEPATITIS INFECTION:  NO   EXPOSURE TO TB NO   GENITAL HERPES    NO   PARVOVIRUS B-19 NO CHICKEN POX  YES   MEASLES NO   STD NO   HIV NO   OTHER RASH OR VIRAL ILLNESS SINCE LMP NO   UTI RECURRENT NO   HPV NO      OB History    Para Term  AB Living   6 2 2   3 1   SAB TAB Ectopic Molar Multiple Live Births     2 1       1       # Outcome Date GA Lbr Herson/2nd Weight Sex Delivery Anes PTL Lv   6 Current                     5 TAB 2019                   4 2019                   3 2019                   2 Term 17 37w2d   6 lb 9 oz (2.977 kg) F Vag-Spont EPI N DARIUS   1 Term 16 38w5d   6 lb 10 oz (3.005 kg) F Vag-Spont EPI N FD      PAST GYNECOLOGICAL  HISTORY:  Positive for abnormal pap smears. Doesn't know the grade  Negative for cervical LEEP / conization /cryosurgery.    Negative for uterine surgery. Negative for ovarian or tubal surgery.      Past Medical History:   Diagnosis Date    Abnormal Pap smear of cervix      will repeat after delivery    Gestational diabetes mellitus (GDM) affecting pregnancy, antepartum 2020         Past Surgical History:   Procedure Laterality Date    DILATION AND CURETTAGE   2019     X 2    MASTOIDECTOMY        TYMPANOSTOMY TUBE PLACEMENT          No Known Allergies     Current Outpatient Medications:     vitamin B-6 (PYRIDOXINE) 50 MG tablet, Take 50 mg by mouth daily    aspirin 81 MG chewable tablet, Take 81 mg by mouth daily     diphenhydrAMINE HCl, Sleep, (UNISOM SLEEPMELTS PO), Take by mouth    Prenatal Vit-Fe Fumarate-FA (PRENATAL PO), Take by mouth     Social History      Tobacco Use    Smoking status: Former Smoker       Packs/day: 0.20       Types: Cigarettes       Last attempt to quit: 2014       Years since quittin.4    Smokeless tobacco: Never Used   Substance Use Topics    Alcohol use:  No      FAMILY MEDICAL HISTORY:   Negative for congenital abnormalities, autism, genetic disease and mental retardation, not listed above.      Review of Systems :   CONSTITUTIONAL : No fever, no chills HEENT : No headache, no visual changes, no rhinorrhea, no sore throat   CARDIOVASCULAR : No pain, no palpitations, no edema   RESPIRATORY : No pain, no shortness of breath   GASTROINTESTINAL : No N/V, no D/C, no abdominal pain   GENITOURINARY : No dysuria, hematuria and no incontinence   MUSCULOSKELETAL : No myalgia, No back pain  NEUROLOGICAL : No numbness, no tingling, no tremors. No history of seizures  ALL OTHER SYSTEMS WERE REPORTED AS NEGATIVE.     PERTINENT PHYSICAL EXAMINATION:   /72   Pulse 90   Temp 98.1 °F (36.7 °C)   Ht 5' 4\" (1.626 m)   Wt 205 lb (93 kg)   LMP 03/26/2020   BMI 35.19 kg/m²   Urine dipstick:   Negative Glucose  1+ Protein     A fetal ultrasound assessment was performed today.  A report is enclosed for your review.     IMPRESSION:    1.  IUP at 31 weeks 0 days gestation based on her Estimated Date of Delivery: 12/31/20    2.  Gestational diabetes versus type 2 diabetes.    3.  History of a prior stillbirth at 37 weeks 5 days gestational age    2.  History of 2 spontaneous first trimester pregnancy losses    5.  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    6.  First trimester NT measurement was at the 65th growth percentile    7.  First trimester nasal bone visualization    8.  Shannon screen showed no increased risk for fetal aneuploidy for the chromosomes assessed.    9.  Subchorionic hematoma, uegvlidr18/8/2020    10.  Normal cervical length 10/29/2020  11.  Estimated fetal weight was at the 61st growth percentile October 23, 2020  12.  Reassuring biophysical profile and cord Doppler testing October 29, 2020  13.  Uterine contractions without cervical change October 29, 2020  14.  Reactive nonstress test October 29, 2020     PLAN:  I would recommend that the patient count fetal movements and call if she notices any subjective decrease in fetal movements, particularly if there are less than 10 major movements in an hour. Non-stress testing should be performed every 3 to 4 days through the balance of the pregnancy. Serial ultrasounds to assess fetal anatomy and growth should be performed. The patient is at increase risk for  morbidity and mortality secondary to her history. Weekly BPP and cord Doppler testing should be performed, unless there is a clinical indication to perform the testing more frequently.     I advised the patient to continue to do home glucose monitoring. She is to check her blood sugars fasting and 2 hours after each meal. Her fasting blood sugars should be less than 92 mg/dl and her 2 hour post prandial blood sugars less than 120 mg/dl. She is to call if her blood sugars are outside of these parameters.     The patient was advised to call if she has any increased vaginal discharge, vaginal bleeding, contractions, abdominal pain, back pain or any new significant symptomatology prior to her next visit. I advised her that these are signs and symptoms of cervical change and require follow-up assessment when they occur.     I requested the patient return for a follow-up assessment in 4 days unless there is a clinical reason for her to return prior to that time. She is to call if she has any problems or questions prior to her next visit. Further evaluation and management will be dependent on her clinical presentation and the results of her testing.      The patient is to continue to follow with you in your office for ongoing obstetric care.     The total time in minutes spent with the patient, reviewing medical records, reviewing imaging studies, performing ultrasonic imaging, reviewing laboratory testing, and documenting information was 10 minutes, of which, 50% of the time was spent in patient education, counseling, and coordinating care with the patient and/or her family. Corry Herndon discussed my recommendations for management of her blood sugars during pregnancy.  We reviewed the results of her ultrasound assessment and fetal testing performed today.  I discussed my recommendations for ongoing evaluation and management of her pregnancy.  I answered all of her questions to her satisfaction.  I asked her to call if she had any additional questions prior to her next visit.       If you have any questions regarding her management, please contact me at your convenience and thank you for allowing me to participate in her care.     Sincerely,           Shanta Fowler MD, [address], Eugenie Hashimoto, [facility], [facility] Christo May, [facility]  Director [address] [place] [address]  [phone]

## 2020-10-30 LAB
GLUCOSE URINE, POC: NEGATIVE
PROTEIN UA: ABNORMAL

## 2020-11-03 ENCOUNTER — ROUTINE PRENATAL (OUTPATIENT)
Dept: OBGYN | Age: 30
End: 2020-11-03
Payer: COMMERCIAL

## 2020-11-03 VITALS
HEART RATE: 111 BPM | WEIGHT: 204 LBS | DIASTOLIC BLOOD PRESSURE: 72 MMHG | SYSTOLIC BLOOD PRESSURE: 124 MMHG | BODY MASS INDEX: 35.02 KG/M2

## 2020-11-03 LAB
GLUCOSE URINE, POC: NEGATIVE
PROTEIN UA: POSITIVE

## 2020-11-03 PROCEDURE — 81002 URINALYSIS NONAUTO W/O SCOPE: CPT | Performed by: OBSTETRICS & GYNECOLOGY

## 2020-11-03 PROCEDURE — 59025 FETAL NON-STRESS TEST: CPT | Performed by: OBSTETRICS & GYNECOLOGY

## 2020-11-03 PROCEDURE — 6360000002 HC RX W HCPCS

## 2020-11-03 PROCEDURE — 90686 IIV4 VACC NO PRSV 0.5 ML IM: CPT

## 2020-11-03 PROCEDURE — G0008 ADMIN INFLUENZA VIRUS VAC: HCPCS

## 2020-11-03 PROCEDURE — 99213 OFFICE O/P EST LOW 20 MIN: CPT | Performed by: OBSTETRICS & GYNECOLOGY

## 2020-11-03 PROCEDURE — 0502F SUBSEQUENT PRENATAL CARE: CPT | Performed by: OBSTETRICS & GYNECOLOGY

## 2020-11-03 NOTE — PROGRESS NOTES
NON STRESS TEST INTERPRETATION    11/3/20    RE:  Sarika Michele   : 1990   AGE: 26 y.o.     GESTATIONAL AGE:  31w5d      INDICATION:  Insulin controlled diabetes, thombophilia     TIME ON:  911      TIME OFF:  932      RESULT:   REACTIVE      FHR Baseline Rate:   145 bpm    PERIODIC CHANGES:    · Accelerations present, variability moderate, no decelerations noted    Aimee Fine MD

## 2020-11-03 NOTE — PROGRESS NOTES
Flu vaccine given without difficulty  Discharge instructions have been discussed with the patient. Patient advised to call our office with any questions or concerns. Voiced understanding.

## 2020-11-03 NOTE — PROGRESS NOTES
Sofiya Ribera    Patient presents for routine prenatal visit today at 31w5d. Follows with MFM, next appointment 11/5/20. She is getting twice weekly NSTs. Denies complaints  Denies VB, or cramping  Feeling movement at this time. Reviewed above. Fetal Heart Rate: 145    Counseled on importance of flu vaccine for PT, patient will receive this today. All questions and concerns addressed at this time      ICD-10-CM    1. Thrombophilia affecting pregnancy in third trimester, antepartum ([facility]  O99.113     D68.59    2. Gestational diabetes mellitus (GDM) affecting pregnancy, antepartum  O24.419 Fetal nonstress test   3. Recurrent pregnancy loss in pregnant patient in third trimester, antepartum  O26.23    4. Prenatal care in third trimester  Z34.93    5. History of stillbirth in pregnant patient in third trimester, antepartum  O09.293         Return in about 1 week (around 11/10/2020) for NST.     Maru Starkey MD

## 2020-11-03 NOTE — PROGRESS NOTES
NST completed. FHR baseline 145 with moderate variability + accelerations. Rare contraction noted. NST reactive per Dr. Peyman Carpenter. See her note.

## 2020-11-05 ENCOUNTER — ROUTINE PRENATAL (OUTPATIENT)
Dept: OBGYN CLINIC | Age: 30
End: 2020-11-05
Payer: COMMERCIAL

## 2020-11-05 VITALS
HEART RATE: 91 BPM | TEMPERATURE: 97.4 F | BODY MASS INDEX: 35.51 KG/M2 | DIASTOLIC BLOOD PRESSURE: 69 MMHG | WEIGHT: 208 LBS | SYSTOLIC BLOOD PRESSURE: 106 MMHG | HEIGHT: 64 IN

## 2020-11-05 LAB
GLUCOSE URINE, POC: NEGATIVE
PROTEIN UA: NEGATIVE

## 2020-11-05 PROCEDURE — 76818 FETAL BIOPHYS PROFILE W/NST: CPT | Performed by: OBSTETRICS & GYNECOLOGY

## 2020-11-05 PROCEDURE — 81002 URINALYSIS NONAUTO W/O SCOPE: CPT | Performed by: OBSTETRICS & GYNECOLOGY

## 2020-11-05 PROCEDURE — 76816 OB US FOLLOW-UP PER FETUS: CPT | Performed by: OBSTETRICS & GYNECOLOGY

## 2020-11-05 PROCEDURE — 76820 UMBILICAL ARTERY ECHO: CPT | Performed by: OBSTETRICS & GYNECOLOGY

## 2020-11-05 PROCEDURE — 99213 OFFICE O/P EST LOW 20 MIN: CPT | Performed by: OBSTETRICS & GYNECOLOGY

## 2020-11-05 NOTE — PATIENT INSTRUCTIONS
Please arrive for your scheduled appointment at least 15 minutes early with your actual insurance card+ a photo ID. Also if you need any refills ordered or have questions, it may take up 48 hours to reply. Please allow ample time for your refills. Call me when you use last refill. Thank you for your cooperation. Any questions contact Julisa at [phone]. If you are experiencing an emergency and need immediate help, call 911 or go to go emergency room or labor and delivery. if you are sick, not feeling well or have an infectious process going on please reschedule your appointment by calling [phone]. Also if any family members are not feeling well, please do not bring them to your appointment. We appreciate your cooperation. We are doing this in order to protect our pregnant mothers+ their babies. Call your primary obstetrician with bleeding, leaking of fluid, abdominal tenderness, headache, blurry vision, epigastric pain and increased urinary frequency. Do kick counts after dinner. Call your primary obstetrician if less than 10 kicks in 2 hours after dinner. Call your primary obstetrician with bleeding, leaking of fluid, abdominal tenderness, headache, blurry vision, epigastric pain and increased urinary frequency. You might be having an NST at your next appt. Please eat a large snack or breakfast before coming to office. Thank you  Patient Education        Weeks 32 to 29 of Your Pregnancy: Care Instructions  Your Care Instructions     During the last few weeks of your pregnancy, you may have more aches and pains. It's important to rest when you can. Your growing baby is putting more pressure on your bladder. So you may need to urinate more often. Hemorrhoids are also common. These are painful, itchy veins in the rectal area. In the 36th week, most women have a test for group B streptococcus (GBS). GBS is a common bacteria that can live in the vagina and rectum. It can make your baby sick after birth.  If you test positive, you will get antibiotics during labor. These will keep your baby from getting the bacteria. You may want to talk with your doctor about banking your baby's umbilical cord blood. This is the blood left in the cord after birth. If you want to save this blood, you must arrange it ahead of time. You can't decide at the last minute. If you haven't already had the Tdap shot during this pregnancy, talk to your doctor about getting it. It will help protect your  against pertussis infection. Follow-up care is a key part of your treatment and safety. Be sure to make and go to all appointments, and call your doctor if you are having problems. It's also a good idea to know your test results and keep a list of the medicines you take. How can you care for yourself at home? Ease hemorrhoids  · Get more liquids, fruits, vegetables, and fiber in your diet. This will help keep your stools soft. · Avoid sitting for too long. Lie on your left side several times a day. · Clean yourself with soft, moist toilet paper. Or you can use witch hazel pads or personal hygiene pads. · If you are uncomfortable, try ice packs. Or you can sit in a warm sitz bath. Do these for 20 minutes at a time, as needed. · Use hydrocortisone cream for pain and itching. Two examples are Anusol and Preparation H Hydrocortisone. · Ask your doctor about taking an over-the-counter stool softener. Consider breastfeeding  · Experts recommend that women breastfeed for 1 year or longer. · Breast milk may help protect your child from some health problems.  babies are less likely than formula-fed babies to:  ? Get ear infections, colds, diarrhea, and pneumonia. ? Be obese or get diabetes later in life. · Women who breastfeed have less bleeding after the birth. Their uteruses also shrink back faster. · Some women who breastfeed lose weight faster. Making milk burns calories.   · Breastfeeding can lower your risk of breast cancer, ovarian cancer, and osteoporosis. Decide about circumcision for boys  · As you make this decision, it may help to think about your personal, [employer]ism, and family traditions. You get to decide if you will keep your son's penis natural or if he will be circumcised. · If you decide that you would like to have your baby circumcised, talk with your doctor. You can share your concerns about pain. And you can discuss your preferences for anesthesia. Where can you learn more? Go to https://WeibupeCrystalsol.QlikTech. org and sign in to your [employer] account. Enter T556 in the [employer] box to learn more about \"Weeks 32 to 34 of Your Pregnancy: Care Instructions. \"     If you do not have an account, please click on the \"Sign Up Now\" link. Current as of: February 11, 2020               Content Version: 12.6  © 4177-1879 [employer]. Care instructions adapted under license by [facility] ([facility]). If you have questions about a medical condition or this instruction, always ask your healthcare professional. [place] [address] any warranty or liability for your use of this information. Patient Education        Learning About When to Call Your Doctor During Pregnancy (After 20 Weeks)  Your Care Instructions  It's common to have concerns about what might be a problem during pregnancy. Although most pregnant women don't have any serious problems, it's important to know when to call your doctor if you have certain symptoms or signs of labor. These are general suggestions. Your doctor may give you some more information about when to call. When to call your doctor (after 20 weeks)  Call 911 anytime you think you may need emergency care. For example, call if:  · You have severe vaginal bleeding. · You have sudden, severe pain in your belly. · You passed out (lost consciousness). · You have a seizure. · You see or feel the umbilical cord.   · You think you are about to deliver your baby and can't make it safely to the hospital.  Call your doctor now or seek immediate medical care if:  · You have vaginal bleeding. · You have belly pain. · You have a fever. · You have symptoms of preeclampsia, such as:  ? Sudden swelling of your face, hands, or feet. ? New vision problems (such as dimness, blurring, or seeing spots). ? A severe headache. · You have a sudden release of fluid from your vagina. (You think your water broke.)  · You think that you may be in labor. This means that you've had at least 6 contractions in an hour. · You notice that your baby has stopped moving or is moving much less than normal.  · You have symptoms of a urinary tract infection. These may include:  ? Pain or burning when you urinate. ? A frequent need to urinate without being able to pass much urine. ? Pain in the flank, which is just below the rib cage and above the waist on either side of the back. ? Blood in your urine. Watch closely for changes in your health, and be sure to contact your doctor if:  · You have vaginal discharge that smells bad. · You have skin changes, such as:  ? A rash. ? Itching. ? Yellow color to your skin. · You have other concerns about your pregnancy. If you have labor signs at 37 weeks or more  If you have signs of labor at 37 weeks or more, your doctor may tell you to call when your labor becomes more active. Symptoms of active labor include:  · Contractions that are regular. · Contractions that are less than 5 minutes apart. · Contractions that are hard to talk through. Follow-up care is a key part of your treatment and safety. Be sure to make and go to all appointments, and call your doctor if you are having problems. It's also a good idea to know your test results and keep a list of the medicines you take. Where can you learn more? Go to https://chmary kay.[email or web address]. org and sign in to your [employer] account.  Enter  in the [facility] box to learn more about \"Learning About When to Call Your Doctor During Pregnancy (After 20 Weeks). \"     If you do not have an account, please click on the \"Sign Up Now\" link. Current as of: February 11, 2020               Content Version: 12.6  © 6018-2657 [employer]. Care instructions adapted under license by [facility] ([facility]). If you have questions about a medical condition or this instruction, always ask your healthcare professional. [address] any warranty or liability for your use of this information. Patient Education        Counting Your Baby's Kicks: Care Instructions  Your Care Instructions     Counting your baby's kicks is one way your doctor can tell that your baby is healthy. Most women--especially in a first pregnancy--feel their baby move for the first time between 16 and 22 weeks. The movement may feel like flutters rather than kicks. Your baby may move more at certain times of the day. When you are active, you may notice less kicking than when you are resting. At your prenatal visits, your doctor will ask whether the baby is active. In your last trimester, your doctor may ask you to count the number of times you feel your baby move. Follow-up care is a key part of your treatment and safety. Be sure to make and go to all appointments, and call your doctor if you are having problems. It's also a good idea to know your test results and keep a list of the medicines you take. How do you count fetal kicks? · A common method of checking your baby's movement is to count the number of kicks or moves you feel in 1 hour. Ten movements (such as kicks, flutters, or rolls) in 1 hour are normal. Some doctors suggest that you count in the morning until you get to 10 movements. Then you can quit for that day and start again the next day. · Pick your baby's most active time of day to count. This may be any time from morning to evening.   · If you do not feel 10 movements in an hour, your baby may be sleeping. Wait for the next hour and count again. When should you call for help? Call your doctor now or seek immediate medical care if:    · You noticed that your baby has stopped moving or is moving much less than normal.   Watch closely for changes in your health, and be sure to contact your doctor if you have any problems. Where can you learn more? Go to https://[email or web address] SFS Digital Mediapeethology.[email or web address] org and sign in to your [employer] account. Enter I828 in the [employer] box to learn more about \"Counting Your Baby's Kicks: Care Instructions. \"     If you do not have an account, please click on the \"Sign Up Now\" link. Current as of: February 11, 2020               Content Version: 12.6  © 3785-2547 [employer]. Care instructions adapted under license by [facility] ([facility]). If you have questions about a medical condition or this instruction, always ask your healthcare professional. [address] any warranty or liability for your use of this information. Patient Education        Diabetes Blood Sugar Emergencies: Your Action Plan  How can you prevent a blood sugar emergency? An important part of living with diabetes is keeping your blood sugar in your target range. You'll need to know what to do if it's too high or too low. Managing your blood sugar levels helps you avoid emergencies. This care sheet will teach you about the signs of high and low blood sugar. It will help you make an action plan with your doctor for when these signs occur. Low blood sugar is more likely to happen if you take certain medicines for diabetes. It can also happen if you skip a meal, drink alcohol, or exercise more than usual.  You may get high blood sugar if you eat differently than you normally do. One example is eating more carbohydrate than usual. Having a cold, the flu, or other sudden illness can also cause high blood sugar levels.  Levels can also rise if you miss a dose of medicine. Any change in how you take your medicine may affect your blood sugar level. So it's important to work with your doctor before you make any changes. Check your blood sugar  Work with your doctor to fill in the blank spaces below that apply to you. Track your levels, know your target range, and write down ways you can get your blood sugar back in your target range. A log book can help you track your levels. Take the book to all of your medical appointments. · Check your blood sugar _____ times a day, at these times:________________________________________________. (For example: Before meals, at bedtime, before exercise, during exercise, other.)  · Your blood sugar target range before a meal is ___________________. Your blood sugar target range after a meal is _______________________. · Do this--___________________________________________________--to get your blood sugar back within your safe range if your blood sugar results are _________________________________________. (For example: Less than 70 or above 250 mg/dL.)  Call your doctor when your blood sugar results are ___________________________________. (For example: Less than 70 or above 250 mg/dL.)  What are the symptoms of low and high blood sugar? Common symptoms of low blood sugar are sweating and feeling shaky, weak, hungry, or confused. Symptoms can start quickly. Common symptoms of high blood sugar are feeling very thirsty or very hungry. You may also pass urine more often than usual. You may have blurry vision and may lose weight without trying. But some people may have high or low blood sugar without having any symptoms. That's a good reason to check your blood sugar on a regular schedule. What should you do if you have symptoms? Work with your doctor to fill in the blank spaces below that apply to you. Low blood sugar  If you have symptoms of low blood sugar, check your blood sugar.  If it's below _____ ( for example, below 70), eat or drink a quick-sugar food that has about 15 grams of carbohydrate. Your goal is to get your level back to your safe range. Check your blood sugar again 15 minutes later. If it's still not in your target range, take another 15 grams of carbohydrate and check your blood sugar again in 15 minutes. Repeat this until you reach your target. Then go back to your regular testing schedule. Children usually need less than 15 grams of carbohydrate. Check with your doctor or diabetes educator for the amount that is right for your child. When you have low blood sugar, it's best to stop or reduce any physical activity until your blood sugar is back in your target range and is stable. If you must stay active, eat or drink 30 grams of carbohydrate. Then check your blood sugar again in 15 minutes. If it's not in your target range, take another 30 grams of carbohydrates. Check your blood sugar again in 15 minutes. Keep doing this until you reach your target. You can then go back to your regular testing schedule. If your symptoms or blood sugar levels are getting worse or have not improved after 15 minutes, seek medical care right away. Here are some examples of quick-sugar foods with 15 grams of carbohydrate:  · 3 or 4 glucose tablets  · 1 tablespoon (3 teaspoons) table sugar  · ½ cup to ¾ cup (4 to 6 ounces) of fruit juice or regular (not diet) soda  · Hard candy (such as 6 Life Savers)  High blood sugar  If you have symptoms of high blood sugar, check your blood sugar. Your goal is to get your level back to your target range. If it's above ______ ( for example, above 250), follow these steps:  · If you missed a dose of your diabetes medicine, take it now. Take only the amount of medicine that you have been prescribed. Do not take more or less medicine. · Give yourself insulin if your doctor has prescribed it for high blood sugar. · Test for ketones, if the doctor told you to do so.  If the results of the ketone test show a moderate-to-large amount of ketones, call the doctor for advice. · Wait 30 minutes after you take the extra insulin or the missed medicine. Check your blood sugar again. If your symptoms or blood sugar levels are getting worse or have not improved after taking these steps, seek medical care right away. Follow-up care is a key part of your treatment and safety. Be sure to make and go to all appointments, and call your doctor if you are having problems. It's also a good idea to know your test results and keep a list of the medicines you take. Where can you learn more? Go to https://Dockerpepiceweb.EarthLink. org and sign in to your [employer] account. Enter D185 in the [employer] box to learn more about \"Diabetes Blood Sugar Emergencies: Your Action Plan. \"     If you do not have an account, please click on the \"Sign Up Now\" link. Current as of: December 20, 2019               Content Version: 12.6  © 6803-2919 [employer]. Care instructions adapted under license by [facility] ([facility]). If you have questions about a medical condition or this instruction, always ask your healthcare professional. [place] [address] any warranty or liability for your use of this information.

## 2020-11-05 NOTE — LETTER
20    Rosalio Oro, [address] [facility], [address] [place]                RE: Kallie Crenshaw  : 1990   AGE: 26 y.o.     This report has been created using voice recognition software. It may contain errors which are inherent in voice recognition technology.     Dear Dr. 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 o. female, who is G6(2,0,3,1). She has an Estimated Date of Delivery: 20 based on her LMP and previous ultrasound assessment. She is currently 32 weeks 0 days gestation based on that assessment. The patient has had no major problems since her last visit. She states that her fetal movement has been good.    She has had no increased vaginal discharge, vaginal bleeding, back pain, dysuria, hematuria, or leaking of amniotic fluid.     I reviewed the patient's home blood sugar monitoring results.  Her fasting blood sugars have been intermittently elevated.  Her postprandial blood sugars are generally less than 120 unless she deviates from her carbohydrate controlled diet.      The patient had 2 prior spontaneous first trimester pregnancy losses.  One of the pregnancies had no yolk sac or embryo identified.  She had a positive pregnancy test which subsequently became negative.     The patient terminated a pregnancy in the first trimester as a result of suspected trisomy 13 based on a chorionic villous sampling.  When the full karyotype analysis was completed it was found that this fetus actually had a normal karyotype.    The patient had a stillbirth with her first pregnancy at 45 weeks 5 days gestational age. She stated that no specific cause was determined. She had no history of medical or surgical disease. She had no history of trauma. She stated that she had decreased fetal movement on the Sunday prior to the Monday on which the baby was confirmed to be demised. No autopsy was performed. The patient stated that the baby's karyotype was normal, however the fetal skin biopsy sample was reported as not able to have a karyotype completed.      The results of [place] screen were negative. I advised her that this a screening test not a diagnostic test. I advised her that the test screens only for trisomy 21, 18 and 13. We discussed the sensitivity of the test which I advised the patient is as follows:       99.99% for detection of trisomy 21 with a specificity of 99.9%     99.99% for trisomy 25 with a specificity of 99.6%     99.99% for trisomy 15 with a specificity of 99.7%      She understands that the [facility] testing does not replace diagnostic genetic testing. She understands the limitations of this testing, including, but not limited to, not detecting partial fetal karyotype abnormalities, and in some cases, limited information regarding unbalanced translocations.  The test is also limited in that the results may not reflect chromosomes of the fetus due to confined placental mosaicism or chromosomal changes in the mother.  The test is validated for single and twin pregnancies with a gestational age of at least 9 weeks.  Chromosomal mosaicism cannot be distinguished, and in some cases, not detected by this method.  A negative test does not eliminate the possibility of fetal chromosome abnormalities in regions tested or and other areas of the genome.  The tests cannot rule out other genetic diseases or birth defects, including open neural tube defects.     A fetal ultrasound evaluation was performed on November 5, 2020.  A 3 SAB                    2 Term 17 37w2d   6 lb 9 oz (2.977 kg) F Vag-Spont EPI N DARIUS   1 Term 16 38w5d   6 lb 10 oz (3.005 kg) F Vag-Spont EPI N FD      PAST GYNECOLOGICAL  HISTORY:  Positive for abnormal pap smears. Doesn't know the grade  Negative for cervical LEEP / conization /cryosurgery.    Negative for uterine surgery. Negative for ovarian or tubal surgery.           Past Medical History:   Diagnosis Date    Abnormal Pap smear of cervix      will repeat after delivery    Gestational diabetes mellitus (GDM) affecting pregnancy, antepartum 2020         Past Surgical History:   Procedure Laterality Date    DILATION AND CURETTAGE   2019     X 2    MASTOIDECTOMY        TYMPANOSTOMY TUBE PLACEMENT          No Known Allergies     Current Outpatient Medications:     vitamin B-6 (PYRIDOXINE) 50 MG tablet, Take 50 mg by mouth daily    aspirin 81 MG chewable tablet, Take 81 mg by mouth daily     diphenhydrAMINE HCl, Sleep, (UNISOM SLEEPMELTS PO), Take by mouth    Prenatal Vit-Fe Fumarate-FA (PRENATAL PO), Take by mouth     Social History      Tobacco Use    Smoking status: Former Smoker       Packs/day: 0.20       Types: Cigarettes       Last attempt to quit:        Years since quittin.4    Smokeless tobacco: Never Used   Substance Use Topics    Alcohol use: No      FAMILY MEDICAL HISTORY:   Negative for congenital abnormalities, autism, genetic disease and mental retardation, not listed above.      Review of Systems :   CONSTITUTIONAL : No fever, no chills   HEENT : No headache, no visual changes, no rhinorrhea, no sore throat   CARDIOVASCULAR : No pain, no palpitations, no edema   RESPIRATORY : No pain, no shortness of breath   GASTROINTESTINAL : No N/V, no D/C, no abdominal pain   GENITOURINARY : No dysuria, hematuria and no incontinence   MUSCULOSKELETAL : No myalgia, No back pain  NEUROLOGICAL : No numbness, no tingling, no tremors.  No history of seizures ALL OTHER SYSTEMS WERE REPORTED AS NEGATIVE.     PERTINENT PHYSICAL EXAMINATION:   /69   Pulse 91   Temp 97.4 °F (36.3 °C)   Ht 5' 4\" (1.626 m)   Wt 208 lb (94.3 kg)   LMP 03/26/2020   BMI 35.70 kg/m²   Urine dipstick:   Negative Glucose  Negative Protein    GENERAL:   The patient is a well developed, female who is alert cooperative and oriented times three in no acute distress. HEENT:  Normo cephalic and atraumatic. No facial edema. ABDOMEN:   Her uterus is gravid. She had no complaint of abdominal pain or tenderness. The fetal heart rate is 143 bpm. The fetus is in the cephalic presentation which was confirmed by the ultrasound assessment. EXTREMITIES:  No peripheral edema is noted.      A fetal ultrasound assessment was performed today.  A report is enclosed for your review.     IMPRESSION:    1.  IUP at 32 weeks 0 days gestation based on her Estimated Date of Delivery: 12/31/20    2.  Gestational diabetes versus type 2 diabetes.    3.  History of a prior stillbirth at 37 weeks 5 days gestational age    2.  History of 2 spontaneous first trimester pregnancy losses    5.  History of ETOP for suspected trisomy 15 (FISH analysis), not confirmed on the final karyotype.    6.  First trimester NT measurement was at the 65th growth percentile    7.  First trimester nasal bone visualization    8.  [place] screen showed no increased risk for fetal aneuploidy for the chromosomes assessed.    9.  Subchorionic hematoma, [id number]/8/2020    10.  Normal cervical length 10/29/2020  11.  Estimated fetal weight was at the 65th growth percentile November 5, 2020  12.  Reassuring biophysical profile and cord Doppler testing November 5, 2020  13.  Uterine contractions without cervical change October 29, 2020  14.  Reactive nonstress test November 5, 2020      PLAN:  I would recommend that the patient count fetal movements and call if she notices any subjective decrease in fetal movements, particularly if there are less than 10 major movements in an hour. Non-stress testing should be performed every 3 to 4 days through the balance of the pregnancy. Serial ultrasounds to assess fetal anatomy and growth should be performed. The patient is at increase risk for  morbidity and mortality secondary to her history. Weekly BPP and cord Doppler testing should be performed, unless there is a clinical indication to perform the testing more frequently.     I advised the patient to continue to do home glucose monitoring. She is to check her blood sugars fasting and 2 hours after each meal. Her fasting blood sugars should be less than 92 mg/dl and her 2 hour post prandial blood sugars less than 120 mg/dl. She is to call if her blood sugars are outside of these parameters.     The patient was advised to call if she has any increased vaginal discharge, vaginal bleeding, contractions, abdominal pain, back pain or any new significant symptomatology prior to her next visit. I advised her that these are signs and symptoms of cervical change and require follow-up assessment when they occur.     I requested the patient return for a follow-up assessment in 4 days unless there is a clinical reason for her to return prior to that time. She is to call if she has any problems or questions prior to her next visit. Further evaluation and management will be dependent on her clinical presentation and the results of her testing.      The patient is to continue to follow with you in your office for ongoing obstetric care.     The total time in minutes spent with the patient, reviewing medical records, reviewing imaging studies, performing ultrasonic imaging, reviewing laboratory testing, and documenting information was 15 minutes, of which, 50% of the time was spent in patient education, counseling, and coordinating care with the patient and/or her family. Ritu Price discussed my recommendations for management of her blood sugars during pregnancy.  We reviewed the results of her ultrasound assessment and fetal testing performed today.  I discussed my recommendations for ongoing evaluation and management of her pregnancy.  I answered all of her questions to her satisfaction.  I asked her to call if she had any additional questions prior to her next visit.       If you have any questions regarding her management, please contact me at your convenience and thank you for allowing me to participate in her care.     Sincerely,           Subhash Samuels MD, [address], Maribeljean Jerry, [address] [facility],  Tori Garcia, [facility]  Director [address] [place] [address]  [phone]

## 2020-11-05 NOTE — PROGRESS NOTES
Pt denies bleeding,lof,ctxPt states blood sugars wnl. Did not bring blood sugar recordPt states good fetal movement. Kick counts encouraged. All questions answered+ information confirmed by pt. Nst completed. Baseline 130 with moderate variabilty+ accelelrations.  Reactive per dr Vinny Love

## 2020-11-10 ENCOUNTER — ROUTINE PRENATAL (OUTPATIENT)
Dept: OBGYN CLINIC | Age: 30
End: 2020-11-10
Payer: COMMERCIAL

## 2020-11-10 VITALS
SYSTOLIC BLOOD PRESSURE: 104 MMHG | HEART RATE: 109 BPM | WEIGHT: 206 LBS | DIASTOLIC BLOOD PRESSURE: 68 MMHG | HEIGHT: 64 IN | BODY MASS INDEX: 35.17 KG/M2 | TEMPERATURE: 97.7 F

## 2020-11-10 PROCEDURE — 76817 TRANSVAGINAL US OBSTETRIC: CPT | Performed by: OBSTETRICS & GYNECOLOGY

## 2020-11-10 PROCEDURE — 99213 OFFICE O/P EST LOW 20 MIN: CPT | Performed by: OBSTETRICS & GYNECOLOGY

## 2020-11-10 PROCEDURE — 99212 OFFICE O/P EST SF 10 MIN: CPT | Performed by: OBSTETRICS & GYNECOLOGY

## 2020-11-10 PROCEDURE — 81002 URINALYSIS NONAUTO W/O SCOPE: CPT | Performed by: OBSTETRICS & GYNECOLOGY

## 2020-11-10 PROCEDURE — 76820 UMBILICAL ARTERY ECHO: CPT | Performed by: OBSTETRICS & GYNECOLOGY

## 2020-11-10 PROCEDURE — 76818 FETAL BIOPHYS PROFILE W/NST: CPT | Performed by: OBSTETRICS & GYNECOLOGY

## 2020-11-10 NOTE — LETTER
11/10/20    [facility], [address] [facility] [address] [facility] [address], [address] [place]                RE: Lulú Lei  : 1990   AGE: 26 y.o.     This report has been created using voice recognition software. It may contain errors which are inherent in voice recognition technology.     Dear Dr. Vito Candelario:  Montreat Stacks an appointment today for the following indications:     Patient Active Problem List   Diagnosis    History of stillbirth in currently pregnant patient    [facility]ozygous M[facility]FR [facility] [id number] [facility] [address] [facility]    Thrombophilia affecting pregnancy in second trimester, antepartum ([facility] [address]    Recurrent pregnancy loss in pregnant patient in first trimester, antepartum    Gestational diabetes mellitus (GDM) affecting pregnancy, antepartum      Latasha Lopez is a 26 y. o. female, who is G6(2,0,3,1). She has an Estimated Date of Delivery: 20 based on her LMP and previous ultrasound assessment. She is currently 32 weeks 5 days gestation based on that assessment.      The patient has had no major problems since her last visit. She states that her fetal movement has been good.    She has had no increased vaginal discharge, vaginal bleeding, back pain, dysuria, hematuria, or leaking of amniotic fluid.     I reviewed the patient's home blood sugar monitoring results.  Her fasting blood sugars have been intermittently elevated.  Her postprandial blood sugars are generally less than 120 unless she deviates from her carbohydrate controlled diet.      The patient had 2 prior spontaneous first trimester pregnancy losses.  One of the pregnancies had no yolk sac or embryo identified.  She had a positive pregnancy test which subsequently became negative.     The patient terminated a pregnancy in the first trimester as a result of suspected trisomy 13 based on a chorionic villous sampling.  When the full karyotype analysis was completed it was found that this fetus actually had a normal karyotype.    The patient had a stillbirth with her first pregnancy at 45 weeks 5 days gestational age. She stated that no specific cause was determined. She had no history of medical or surgical disease. She had no history of trauma. She stated that she had decreased fetal movement on the Sunday prior to the Monday on which the baby was confirmed to be demised. No autopsy was performed. The patient stated that the baby's karyotype was normal, however the fetal skin biopsy sample was reported as not able to have a karyotype completed.      The results of [place] screen were negative. I advised her that this a screening test not a diagnostic test. I advised her that the test screens only for trisomy 21, 18 and 13. We discussed the sensitivity of the test which I advised the patient is as follows:       99.99% for detection of trisomy 21 with a specificity of 99.9%     99.99% for trisomy 25 with a specificity of 99.6%     99.99% for trisomy 15 with a specificity of 99.7%      She understands that the [facility] testing does not replace diagnostic genetic testing. She understands the limitations of this testing, including, but not limited to, not detecting partial fetal karyotype abnormalities, and in some cases, limited information regarding unbalanced translocations.  The test is also limited in that the results may not reflect chromosomes of the fetus due to confined placental mosaicism or chromosomal changes in the mother.  The test is validated for single and twin pregnancies with a gestational age of at least 9 weeks.  Chromosomal mosaicism cannot be distinguished, and in some cases, not detected by this method.  A negative test does not eliminate the possibility of fetal chromosome abnormalities in regions tested or and other areas of the genome.  The tests cannot rule out other genetic diseases or birth defects, including open neural tube defects.     A fetal ultrasound evaluation was performed on November 5, 2020.  A detailed report included under the media tab from today's date.  A living bae intrauterine fetus was identified in the cephalic presentation, with normal fetal heart motion and normal fetal motion noted.  The placenta was on the posterior and right lateral aspects of the uterus.  The amniotic fluid index was 15.5 cm.  The estimated fetal weight was at the 65th growth percentile for gestational age.  Visualization of the fetal anatomy was somewhat limited secondary to poor acoustic transmission to the patient's anterior abdominal wall in the fetal position. The biophysical profile and cord Doppler studies were both reassuring. There was no evidence of absence, or reversal of end-diastolic flow. The nonstress test today was reactive. Symptomatic, low amplitude frequent uterine contractions were present. The amniotic fluid index today was 13.5 cm. The fetus was in the cephalic presentation. The biophysical profile cord Doppler studies were both reassuring.   There was no evidence of absence, or reversal of end-diastolic flow.                          GENETIC SCREENING/TERATOLOGY COUNSELING                      (Includes patient, FTB, and any affected family members)     Patient Age > 35 Years NO   Thalassemia ( MVC<80) NO   Congential Heart Defect NO   Neural Tube Defect NO   Blu-Sachs NO   Sickle Cell Disease NO   Sickle Cell Trait NO   Sickle C Disease or Trait NO   Hemophilia NO   Muscular Dystrophy NO   Cystic Fibrosis NO   Campbell Disease NO   Autism NO   Mental Retardation NO   History of Fragile X NO   Maternal Diabetes NO   Other Genetic Disease or Syndrome NO   Previous Child With Congenital Abnormality Not Listed NO   Recreational Drugs NO                                                INFECTION HISTORY          HEPATITIS IMMUNIZED:  YES   HEPATITIS INFECTION:  NO   EXPOSURE TO TB NO   GENITAL HERPES    NO   PARVOVIRUS B-19 NO   CHICKEN POX  YES   MEASLES NO   STD NO   HIV NO OTHER RASH OR VIRAL ILLNESS SINCE LMP NO   UTI RECURRENT NO   HPV NO      OB History    Para Term  AB Living   6 2 2   3 1   SAB TAB Ectopic Molar Multiple Live Births     2 1       1       # Outcome Date GA Lbr Herson/2nd Weight Sex Delivery Anes PTL Lv   6 Current                     5 TAB 2019                   4 2019                   3 2019                   2 Term 17 37w2d   6 lb 9 oz (2.977 kg) F Vag-Spont EPI N DARIUS   1 Term 16 38w5d   6 lb 10 oz (3.005 kg) F Vag-Spont EPI N FD      PAST GYNECOLOGICAL  HISTORY:  Positive for abnormal pap smears. Doesn't know the grade  Negative for cervical LEEP / conization /cryosurgery.    Negative for uterine surgery. Negative for ovarian or tubal surgery.      Past Medical History:   Diagnosis Date    Abnormal Pap smear of cervix      will repeat after delivery    Gestational diabetes mellitus (GDM) affecting pregnancy, antepartum 2020         Past Surgical History:   Procedure Laterality Date    DILATION AND CURETTAGE        X 2    MASTOIDECTOMY        TYMPANOSTOMY TUBE PLACEMENT          No Known Allergies     Current Outpatient Medications:     vitamin B-6 (PYRIDOXINE) 50 MG tablet, Take 50 mg by mouth daily    aspirin 81 MG chewable tablet, Take 81 mg by mouth daily     diphenhydrAMINE HCl, Sleep, (UNISOM SLEEPMELTS PO), Take by mouth    Prenatal Vit-Fe Fumarate-FA (PRENATAL PO), Take by mouth     Social History      Tobacco Use    Smoking status: Former Smoker       Packs/day: 0.20       Types: Cigarettes       Last attempt to quit:        Years since quittin.4    Smokeless tobacco: Never Used   Substance Use Topics    Alcohol use:  No      FAMILY MEDICAL HISTORY:   Negative for congenital abnormalities, autism, genetic disease and mental retardation, not listed above.      Review of Systems :   CONSTITUTIONAL : No fever, no chills HEENT : No headache, no visual changes, no rhinorrhea, no sore throat   CARDIOVASCULAR : No pain, no palpitations, no edema   RESPIRATORY : No pain, no shortness of breath   GASTROINTESTINAL : No N/V, no D/C, no abdominal pain   GENITOURINARY : No dysuria, hematuria and no incontinence   MUSCULOSKELETAL : No myalgia, No back pain  NEUROLOGICAL : No numbness, no tingling, no tremors. No history of seizures  ALL OTHER SYSTEMS WERE REPORTED AS NEGATIVE.     PERTINENT PHYSICAL EXAMINATION:   /68   Pulse 109   Temp 97.7 °F (36.5 °C)   Ht 5' 4\" (1.626 m)   Wt 206 lb (93.4 kg)   LMP 03/26/2020   BMI 35.36 kg/m²   Urine dipstick:   Negative Glucose  1+ protein     A fetal ultrasound assessment was performed today.  A report is enclosed for your review.     IMPRESSION:    1.  IUP at 32 weeks 5 days gestation based on her Estimated Date of Delivery: 12/31/20    2.  Gestational diabetes versus type 2 diabetes.    3.  History of a prior stillbirth at 37 weeks 5 days gestational age    2.  History of 2 spontaneous first trimester pregnancy losses    5.  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    6.  First trimester NT measurement was at the 65th growth percentile    7.  First trimester nasal bone visualization    8.  [place] screen showed no increased risk for fetal aneuploidy for the chromosomes assessed.    9.  Subchorionic hematoma, [id number]/8/2020    10.  Normal cervical length 11/10/2020  11.  Estimated fetal weight was at the 65th growth percentile 11/5/2020   12.  Reassuring biophysical profile and cord Doppler testing 11/10/2020  13.  Uterine contractions without cervical change 11/10/2020   14.  Reactive nonstress test 11/10/2020     PLAN:  I would recommend that the patient count fetal movements and call if she notices any subjective decrease in fetal movements, particularly if there are less than 10 major movements in an hour. Non-stress testing should be performed every 3 to 4 days through the balance of the pregnancy. Serial ultrasounds to assess fetal anatomy and growth should be performed. The patient is at increase risk for  morbidity and mortality secondary to her history. Weekly BPP and cord Doppler testing should be performed, unless there is a clinical indication to perform the testing more frequently.     I advised the patient to continue to do home glucose monitoring. She is to check her blood sugars fasting and 2 hours after each meal. Her fasting blood sugars should be less than 92 mg/dl and her 2 hour post prandial blood sugars less than 120 mg/dl. She is to call if her blood sugars are outside of these parameters.     The patient was advised to call if she has any increased vaginal discharge, vaginal bleeding, contractions, abdominal pain, back pain or any new significant symptomatology prior to her next visit. I advised her that these are signs and symptoms of cervical change and require follow-up assessment when they occur.     I requested the patient return for a follow-up assessment in 4 days unless there is a clinical reason for her to return prior to that time. She is to call if she has any problems or questions prior to her next visit. Further evaluation and management will be dependent on her clinical presentation and the results of her testing.      The patient is to continue to follow with you in your office for ongoing obstetric care.     The total time in minutes spent with the patient, reviewing medical records, reviewing imaging studies, performing ultrasonic imaging, reviewing laboratory testing, and documenting information was 10 minutes, of which, 50% of the time was spent in patient education, counseling, and coordinating care with the patient and/or her family. Lindsey Babb discussed my recommendations for management of her blood sugars during pregnancy.  We reviewed the results of her ultrasound assessment and fetal testing performed today.  I discussed my recommendations for ongoing evaluation and management of her pregnancy.  I answered all of her questions to her satisfaction.  I asked her to call if she had any additional questions prior to her next visit.       If you have any questions regarding her management, please contact me at your convenience and thank you for allowing me to participate in her care.     Sincerely,           Tamara Prather MD, [address], Lety Hurtado, [address] [facility],  Tori Garcia, BREANNA  Director [address] [place] [address]  [phone]

## 2020-11-10 NOTE — PATIENT INSTRUCTIONS
Please arrive for your scheduled appointment at least 15 minutes early with your actual insurance card+ a photo ID. Also if you need any refills ordered or have questions, it may take up 48 hours to reply. Please allow ample time for your refills. Call me when you use last refill. Thank you for your cooperation. Any questions contact Julisa at [phone]. If you are experiencing an emergency and need immediate help, call 911 or go to go emergency room or labor and delivery. if you are sick, not feeling well or have an infectious process going on please reschedule your appointment by calling [phone]. Also if any family members are not feeling well, please do not bring them to your appointment. We appreciate your cooperation. We are doing this in order to protect our pregnant mothers+ their babies. Call your primary obstetrician with bleeding, leaking of fluid, abdominal tenderness, headache, blurry vision, epigastric pain and increased urinary frequency. Do kick counts after dinner. Call your primary obstetrician if less than 10 kicks in 2 hours after dinner. Call your primary obstetrician with bleeding, leaking of fluid, abdominal tenderness, headache, blurry vision, epigastric pain and increased urinary frequency. You might be having an NST at your next appt. Please eat a large snack or breakfast before coming to office. Thank you  Patient Education        Weeks 32 to 29 of Your Pregnancy: Care Instructions  Your Care Instructions     During the last few weeks of your pregnancy, you may have more aches and pains. It's important to rest when you can. Your growing baby is putting more pressure on your bladder. So you may need to urinate more often. Hemorrhoids are also common. These are painful, itchy veins in the rectal area. In the 36th week, most women have a test for group B streptococcus (GBS). GBS is a common bacteria that can live in the vagina and rectum. It can make your baby sick after birth.  If you test positive, you will get antibiotics during labor. These will keep your baby from getting the bacteria. You may want to talk with your doctor about banking your baby's umbilical cord blood. This is the blood left in the cord after birth. If you want to save this blood, you must arrange it ahead of time. You can't decide at the last minute. If you haven't already had the Tdap shot during this pregnancy, talk to your doctor about getting it. It will help protect your  against pertussis infection. Follow-up care is a key part of your treatment and safety. Be sure to make and go to all appointments, and call your doctor if you are having problems. It's also a good idea to know your test results and keep a list of the medicines you take. How can you care for yourself at home? Ease hemorrhoids  · Get more liquids, fruits, vegetables, and fiber in your diet. This will help keep your stools soft. · Avoid sitting for too long. Lie on your left side several times a day. · Clean yourself with soft, moist toilet paper. Or you can use witch hazel pads or personal hygiene pads. · If you are uncomfortable, try ice packs. Or you can sit in a warm sitz bath. Do these for 20 minutes at a time, as needed. · Use hydrocortisone cream for pain and itching. Two examples are Anusol and Preparation H Hydrocortisone. · Ask your doctor about taking an over-the-counter stool softener. Consider breastfeeding  · Experts recommend that women breastfeed for 1 year or longer. · Breast milk may help protect your child from some health problems.  babies are less likely than formula-fed babies to:  ? Get ear infections, colds, diarrhea, and pneumonia. ? Be obese or get diabetes later in life. · Women who breastfeed have less bleeding after the birth. Their uteruses also shrink back faster. · Some women who breastfeed lose weight faster. Making milk burns calories.   · Breastfeeding can lower your risk of breast deliver your baby and can't make it safely to the hospital.  Call your doctor now or seek immediate medical care if:  · You have vaginal bleeding. · You have belly pain. · You have a fever. · You have symptoms of preeclampsia, such as:  ? Sudden swelling of your face, hands, or feet. ? New vision problems (such as dimness, blurring, or seeing spots). ? A severe headache. · You have a sudden release of fluid from your vagina. (You think your water broke.)  · You think that you may be in labor. This means that you've had at least 6 contractions in an hour. · You notice that your baby has stopped moving or is moving much less than normal.  · You have symptoms of a urinary tract infection. These may include:  ? Pain or burning when you urinate. ? A frequent need to urinate without being able to pass much urine. ? Pain in the flank, which is just below the rib cage and above the waist on either side of the back. ? Blood in your urine. Watch closely for changes in your health, and be sure to contact your doctor if:  · You have vaginal discharge that smells bad. · You have skin changes, such as:  ? A rash. ? Itching. ? Yellow color to your skin. · You have other concerns about your pregnancy. If you have labor signs at 37 weeks or more  If you have signs of labor at 37 weeks or more, your doctor may tell you to call when your labor becomes more active. Symptoms of active labor include:  · Contractions that are regular. · Contractions that are less than 5 minutes apart. · Contractions that are hard to talk through. Follow-up care is a key part of your treatment and safety. Be sure to make and go to all appointments, and call your doctor if you are having problems. It's also a good idea to know your test results and keep a list of the medicines you take. Where can you learn more? Go to https://chmary kay.[employer] People. org and sign in to your [employer] account.  Enter  in the [facility] movements in an hour, your baby may be sleeping. Wait for the next hour and count again. When should you call for help? Call your doctor now or seek immediate medical care if:    · You noticed that your baby has stopped moving or is moving much less than normal.   Watch closely for changes in your health, and be sure to contact your doctor if you have any problems. Where can you learn more? Go to https://FSP InstrumentspeBioStable.Plexiso[employer]. org and sign in to your [employer] account. Enter Q597 in the [employer] box to learn more about \"Counting Your Baby's Kicks: Care Instructions. \"     If you do not have an account, please click on the \"Sign Up Now\" link. Current as of: February 11, 2020               Content Version: 12.6  © 5083-5059 [employer]. Care instructions adapted under license by [facility] ([facility]). If you have questions about a medical condition or this instruction, always ask your healthcare professional. [address] any warranty or liability for your use of this information. Patient Education        Diabetes Blood Sugar Emergencies: Your Action Plan  How can you prevent a blood sugar emergency? An important part of living with diabetes is keeping your blood sugar in your target range. You'll need to know what to do if it's too high or too low. Managing your blood sugar levels helps you avoid emergencies. This care sheet will teach you about the signs of high and low blood sugar. It will help you make an action plan with your doctor for when these signs occur. Low blood sugar is more likely to happen if you take certain medicines for diabetes. It can also happen if you skip a meal, drink alcohol, or exercise more than usual.  You may get high blood sugar if you eat differently than you normally do. One example is eating more carbohydrate than usual. Having a cold, the flu, or other sudden illness can also cause high blood sugar levels.  Levels can also for example, below 70), eat or drink a quick-sugar food that has about 15 grams of carbohydrate. Your goal is to get your level back to your safe range. Check your blood sugar again 15 minutes later. If it's still not in your target range, take another 15 grams of carbohydrate and check your blood sugar again in 15 minutes. Repeat this until you reach your target. Then go back to your regular testing schedule. Children usually need less than 15 grams of carbohydrate. Check with your doctor or diabetes educator for the amount that is right for your child. When you have low blood sugar, it's best to stop or reduce any physical activity until your blood sugar is back in your target range and is stable. If you must stay active, eat or drink 30 grams of carbohydrate. Then check your blood sugar again in 15 minutes. If it's not in your target range, take another 30 grams of carbohydrates. Check your blood sugar again in 15 minutes. Keep doing this until you reach your target. You can then go back to your regular testing schedule. If your symptoms or blood sugar levels are getting worse or have not improved after 15 minutes, seek medical care right away. Here are some examples of quick-sugar foods with 15 grams of carbohydrate:  · 3 or 4 glucose tablets  · 1 tablespoon (3 teaspoons) table sugar  · ½ cup to ¾ cup (4 to 6 ounces) of fruit juice or regular (not diet) soda  · Hard candy (such as 6 Life Savers)  High blood sugar  If you have symptoms of high blood sugar, check your blood sugar. Your goal is to get your level back to your target range. If it's above ______ ( for example, above 250), follow these steps:  · If you missed a dose of your diabetes medicine, take it now. Take only the amount of medicine that you have been prescribed. Do not take more or less medicine. · Give yourself insulin if your doctor has prescribed it for high blood sugar. · Test for ketones, if the doctor told you to do so.  If the

## 2020-11-10 NOTE — PROGRESS NOTES
Pt denies bleeding,lof,ctxPt states blood sugars wnl. Blood sugar record scanned to pt chart. Pt states good fetal movement. Kick counts encouraged. Denies headaches,visual issues,epigastric discomfortAll questions answered+ information confirmed by pt. Nst completed. Baseline 130  with moderate variabilty+ accelelrations. irreg ctx noted. Discussed NST + ctx with dr Fallon Cordon. Cervical length to be performed.   Reactive per dr Fallon Cordon

## 2020-11-11 LAB
GLUCOSE URINE, POC: NEGATIVE
PROTEIN UA: ABNORMAL

## 2020-11-12 ENCOUNTER — ROUTINE PRENATAL (OUTPATIENT)
Dept: OBGYN CLINIC | Age: 30
End: 2020-11-12
Payer: COMMERCIAL

## 2020-11-12 VITALS
SYSTOLIC BLOOD PRESSURE: 112 MMHG | BODY MASS INDEX: 35.63 KG/M2 | DIASTOLIC BLOOD PRESSURE: 72 MMHG | TEMPERATURE: 97.4 F | HEART RATE: 119 BPM | WEIGHT: 207.6 LBS

## 2020-11-12 PROBLEM — Z3A.33 33 WEEKS GESTATION OF PREGNANCY: Status: ACTIVE | Noted: 2020-11-12

## 2020-11-12 PROCEDURE — 81002 URINALYSIS NONAUTO W/O SCOPE: CPT | Performed by: OBSTETRICS & GYNECOLOGY

## 2020-11-12 PROCEDURE — 99212 OFFICE O/P EST SF 10 MIN: CPT | Performed by: OBSTETRICS & GYNECOLOGY

## 2020-11-12 PROCEDURE — 99213 OFFICE O/P EST LOW 20 MIN: CPT | Performed by: OBSTETRICS & GYNECOLOGY

## 2020-11-12 PROCEDURE — 76818 FETAL BIOPHYS PROFILE W/NST: CPT | Performed by: OBSTETRICS & GYNECOLOGY

## 2020-11-12 PROCEDURE — 76820 UMBILICAL ARTERY ECHO: CPT | Performed by: OBSTETRICS & GYNECOLOGY

## 2020-11-12 NOTE — LETTER
20    Landon Becker, [address]                RE: Emma Course  : 1990   AGE: 26 y.o.     This report has been created using voice recognition software. It may contain errors which are inherent in voice recognition technology.     Dear Dr. 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 o. female, who is G6(2,0,3,1). She has an Estimated Date of Delivery: 20 based on her LMP and previous ultrasound assessment. She is currently 33 weeks 0 days gestation based on that assessment.      The patient has had no major problems since her last visit. She states that her fetal movement has been good.    She has had no increased vaginal discharge, vaginal bleeding, back pain, dysuria, hematuria, or leaking of amniotic fluid.     I reviewed the patient's home blood sugar monitoring results.  Her fasting blood sugars have been intermittently elevated.  Her postprandial blood sugars are generally less than 120 unless she deviates from her carbohydrate controlled diet.      The patient had 2 prior spontaneous first trimester pregnancy losses.  One of the pregnancies had no yolk sac or embryo identified.  She had a positive pregnancy test which subsequently became negative.     The patient terminated a pregnancy in the first trimester as a result of suspected trisomy 13 based on a chorionic villous sampling.  When the full karyotype analysis was completed it was found that this fetus actually had a normal karyotype.    The patient had a stillbirth with her first pregnancy at 45 weeks 5 days gestational age. She stated that no specific cause was determined. She had no history of medical or surgical disease. She had no history of trauma. She stated that she had decreased fetal movement on the Sunday prior to the Monday on which the baby was confirmed to be demised. No autopsy was performed. The patient stated that the baby's karyotype was normal, however the fetal skin biopsy sample was reported as not able to have a karyotype completed.      The results of [place] screen were negative. I advised her that this a screening test not a diagnostic test. I advised her that the test screens only for trisomy 21, 18 and 13. We discussed the sensitivity of the test which I advised the patient is as follows:       99.99% for detection of trisomy 21 with a specificity of 99.9%     99.99% for trisomy 25 with a specificity of 99.6%     99.99% for trisomy 15 with a specificity of 99.7%      She understands that the [facility] testing does not replace diagnostic genetic testing. She understands the limitations of this testing, including, but not limited to, not detecting partial fetal karyotype abnormalities, and in some cases, limited information regarding unbalanced translocations.  The test is also limited in that the results may not reflect chromosomes of the fetus due to confined placental mosaicism or chromosomal changes in the mother.  The test is validated for single and twin pregnancies with a gestational age of at least 9 weeks.  Chromosomal mosaicism cannot be distinguished, and in some cases, not detected by this method.  A negative test does not eliminate the possibility of fetal chromosome abnormalities in regions tested or and other areas of the genome.  The tests cannot rule out other genetic diseases or birth defects, including open neural tube defects.     A fetal ultrasound evaluation was performed on November 5, 2020.  A MEASLES NO   STD NO   HIV NO   OTHER RASH OR VIRAL ILLNESS SINCE LMP NO   UTI RECURRENT NO   HPV NO      OB History    Para Term  AB Living   6 2 2   3 1   SAB TAB Ectopic Molar Multiple Live Births     2 1       1       # Outcome Date GA Lbr Herson/2nd Weight Sex Delivery Anes PTL Lv   6 Current                     5 TAB 2019                   4 2019                   3 2019                   2 Term 17 37w2d   6 lb 9 oz (2.977 kg) F Vag-Spont EPI N DARIUS   1 Term 16 38w5d   6 lb 10 oz (3.005 kg) F Vag-Spont EPI N FD      PAST GYNECOLOGICAL  HISTORY:  Positive for abnormal pap smears. Doesn't know the grade  Negative for cervical LEEP / conization /cryosurgery.    Negative for uterine surgery. Negative for ovarian or tubal surgery.      Past Medical History:   Diagnosis Date    Abnormal Pap smear of cervix      will repeat after delivery    Gestational diabetes mellitus (GDM) affecting pregnancy, antepartum 2020         Past Surgical History:   Procedure Laterality Date    DILATION AND CURETTAGE        X 2    MASTOIDECTOMY        TYMPANOSTOMY TUBE PLACEMENT          No Known Allergies     Current Outpatient Medications:     vitamin B-6 (PYRIDOXINE) 50 MG tablet, Take 50 mg by mouth daily    aspirin 81 MG chewable tablet, Take 81 mg by mouth daily     diphenhydrAMINE HCl, Sleep, (UNISOM SLEEPMELTS PO), Take by mouth    Prenatal Vit-Fe Fumarate-FA (PRENATAL PO), Take by mouth     Social History      Tobacco Use    Smoking status: Former Smoker       Packs/day: 0.20       Types: Cigarettes       Last attempt to quit:        Years since quittin.4    Smokeless tobacco: Never Used   Substance Use Topics    Alcohol use:  No      FAMILY MEDICAL HISTORY:   Negative for congenital abnormalities, autism, genetic disease and mental retardation, not listed above.      Review of Systems :   CONSTITUTIONAL : No fever, no chills Shira [facility] Or screen showed no increased risk for fetal aneuploidy for the chromosomes assessed.    9.  Subchorionic hematoma, [id number]    10.  Normal cervical length 2020  11.  Estimated fetal weight was at the 65th growth percentile 2020   12.  Reassuring biophysical profile and cord Doppler testing 2020  13.  Uterine contractions without cervical change 2020   14.  Reactive nonstress test 2020     PLAN:  I would recommend that the patient count fetal movements and call if she notices any subjective decrease in fetal movements, particularly if there are less than 10 major movements in an hour. Non-stress testing should be performed every 3 to 4 days through the balance of the pregnancy. Serial ultrasounds to assess fetal anatomy and growth should be performed. The patient is at increase risk for  morbidity and mortality secondary to her history. Weekly BPP and cord Doppler testing should be performed, unless there is a clinical indication to perform the testing more frequently.     I advised the patient to continue to do home glucose monitoring. She is to check her blood sugars fasting and 2 hours after each meal. Her fasting blood sugars should be less than 92 mg/dl and her 2 hour post prandial blood sugars less than 120 mg/dl. She is to call if her blood sugars are outside of these parameters.     The patient was advised to call if she has any increased vaginal discharge, vaginal bleeding, contractions, abdominal pain, back pain or any new significant symptomatology prior to her next visit. I advised her that these are signs and symptoms of cervical change and require follow-up assessment when they occur.     I requested the patient return for a follow-up assessment in 4 days unless there is a clinical reason for her to return prior to that time. She is to call if she has any problems or questions prior to her next visit.  Further evaluation and management will be dependent on her clinical presentation and the results of her testing.      The patient is to continue to follow with you in your office for ongoing obstetric care.     The total time in minutes spent with the patient, reviewing medical records, reviewing imaging studies, performing ultrasonic imaging, reviewing laboratory testing, and documenting information was 10 minutes, of which, 50% of the time was spent in patient education, counseling, and coordinating care with the patient and/or her family. Frank Gómez discussed my recommendations for management of her blood sugars during pregnancy. We reviewed the results of her ultrasound assessment and fetal testing performed today.  I discussed my recommendations for ongoing evaluation and management of her pregnancy.  I answered all of her questions to her satisfaction.  I asked her to call if she had any additional questions prior to her next visit.       If you have any questions regarding her management, please contact me at your convenience and thank you for allowing me to participate in her care.     Sincerely,           Cici Mchugh MD, [address], [place] [address], [address] [facility],  Tori Garcia, [facility]  Director [address] [place] [address]  [phone]

## 2020-11-12 NOTE — PATIENT INSTRUCTIONS
taking an over-the-counter stool softener. Consider breastfeeding  · Experts recommend that women breastfeed for 1 year or longer. · Breast milk may help protect your child from some health problems.  babies are less likely than formula-fed babies to:  ? Get ear infections, colds, diarrhea, and pneumonia. ? Be obese or get diabetes later in life. · Women who breastfeed have less bleeding after the birth. Their uteruses also shrink back faster. · Some women who breastfeed lose weight faster. Making milk burns calories. · Breastfeeding can lower your risk of breast cancer, ovarian cancer, and osteoporosis. Decide about circumcision for boys  · As you make this decision, it may help to think about your personal, Christian, and family traditions. You get to decide if you will keep your son's penis natural or if he will be circumcised. · If you decide that you would like to have your baby circumcised, talk with your doctor. You can share your concerns about pain. And you can discuss your preferences for anesthesia. Where can you learn more? Go to https://[email or web address]-A-Vox.[email or web address] org and sign in to your [employer] account. Enter J259 in the Svbtle box to learn more about \"Weeks 32 to 34 of Your Pregnancy: Care Instructions. \"     If you do not have an account, please click on the \"Sign Up Now\" link. Current as of: February 11, 2020               Content Version: 12.6  © 7382-2907 [employer]. Care instructions adapted under license by [facility] ([facility]). If you have questions about a medical condition or this instruction, always ask your healthcare professional. [place] [address] any warranty or liability for your use of this information. Patient Education        Learning About When to Call Your Doctor During Pregnancy (After 20 Weeks)  Your Care Instructions  It's common to have concerns about what might be a problem during pregnancy.  Although most pregnant women don't have any serious problems, it's important to know when to call your doctor if you have certain symptoms or signs of labor. These are general suggestions. Your doctor may give you some more information about when to call. When to call your doctor (after 20 weeks)  Call 911 anytime you think you may need emergency care. For example, call if:  · You have severe vaginal bleeding. · You have sudden, severe pain in your belly. · You passed out (lost consciousness). · You have a seizure. · You see or feel the umbilical cord. · You think you are about to deliver your baby and can't make it safely to the hospital.  Call your doctor now or seek immediate medical care if:  · You have vaginal bleeding. · You have belly pain. · You have a fever. · You have symptoms of preeclampsia, such as:  ? Sudden swelling of your face, hands, or feet. ? New vision problems (such as dimness, blurring, or seeing spots). ? A severe headache. · You have a sudden release of fluid from your vagina. (You think your water broke.)  · You think that you may be in labor. This means that you've had at least 6 contractions in an hour. · You notice that your baby has stopped moving or is moving much less than normal.  · You have symptoms of a urinary tract infection. These may include:  ? Pain or burning when you urinate. ? A frequent need to urinate without being able to pass much urine. ? Pain in the flank, which is just below the rib cage and above the waist on either side of the back. ? Blood in your urine. Watch closely for changes in your health, and be sure to contact your doctor if:  · You have vaginal discharge that smells bad. · You have skin changes, such as:  ? A rash. ? Itching. ? Yellow color to your skin. · You have other concerns about your pregnancy.   If you have labor signs at 37 weeks or more  If you have signs of labor at 37 weeks or more, your doctor may tell you to call when your labor becomes more active. Symptoms of active labor include:  · Contractions that are regular. · Contractions that are less than 5 minutes apart. · Contractions that are hard to talk through. Follow-up care is a key part of your treatment and safety. Be sure to make and go to all appointments, and call your doctor if you are having problems. It's also a good idea to know your test results and keep a list of the medicines you take. Where can you learn more? Go to https://[email or web address] [email or web address] org and sign in to your [employer] account. Enter  in the [employer] box to learn more about \"Learning About When to Call Your Doctor During Pregnancy (After 20 Weeks). \"     If you do not have an account, please click on the \"Sign Up Now\" link. Current as of: February 11, 2020               Content Version: 12.6  © 2078-8182 [employer]. Care instructions adapted under license by [facility] ([facility]). If you have questions about a medical condition or this instruction, always ask your healthcare professional. [address] any warranty or liability for your use of this information.

## 2020-11-12 NOTE — PROGRESS NOTES
33 week diabetic  States baby is active Denies bleeding leakage of fluid or contractions. Doing daily kick counts    nst test started @ [phone]  Mom marking fetal movement  Moderate variability noted, accelerations noted  Reviewed ended @  Call your obstetrician for:    Bleeding, leakage of fluid, abdominal tenderness, headaches, burred vision or  epigastric pain or decreased fetal movement or increased in urinary frequency.    Call if any questions or concerns

## 2020-11-16 ENCOUNTER — HOSPITAL ENCOUNTER (OUTPATIENT)
Age: 30
Discharge: HOME | End: 2020-11-16
Attending: OBSTETRICS & GYNECOLOGY | Admitting: OBSTETRICS & GYNECOLOGY
Payer: COMMERCIAL

## 2020-11-16 VITALS
TEMPERATURE: 98.3 F | HEART RATE: 113 BPM | RESPIRATION RATE: 16 BRPM | DIASTOLIC BLOOD PRESSURE: 76 MMHG | SYSTOLIC BLOOD PRESSURE: 121 MMHG

## 2020-11-16 PROBLEM — Z36.89 NST (NON-STRESS TEST) REACTIVE: Status: ACTIVE | Noted: 2020-11-16

## 2020-11-16 PROCEDURE — 59025 FETAL NON-STRESS TEST: CPT

## 2020-11-16 PROCEDURE — 59025 FETAL NON-STRESS TEST: CPT | Performed by: MIDWIFE

## 2020-11-16 PROCEDURE — 99211 OFF/OP EST MAY X REQ PHY/QHP: CPT

## 2020-11-16 NOTE — H&P
Patient is [id number]'F last menstrual period was 03/26/2020.,  33w4d here for NST.     Estimated Date of Delivery: 12/31/20    Reason for NST: historyof stillbirth    /76   Pulse 113   Temp 98.3 °F (36.8 °C) (Oral)   Resp 16   LMP 03/26/2020     Contractions: none    FHR:140,  Variability: moderate,   Accelerations: present, Decelerations: none    Assessment NST is reactive

## 2020-11-16 NOTE — PROGRESS NOTES
. 33.4 weeks gestation. Presents to unit for scheduled NST for thrombophilia and GDM- diet controlled. Placed on efm. NST button provided and explained. Denies lof, vb, ctxs. States positive fm. Call light within reach.

## 2020-11-16 NOTE — PROGRESS NOTES
Discharge instructions provided and explained to patient. Patient verbalized understanding and provided signature. Patient has no further questions at this time. Patient left unit ambulatory.

## 2020-11-18 ENCOUNTER — ROUTINE PRENATAL (OUTPATIENT)
Dept: OBGYN | Age: 30
End: 2020-11-18
Payer: COMMERCIAL

## 2020-11-18 VITALS
WEIGHT: 209 LBS | BODY MASS INDEX: 35.87 KG/M2 | HEART RATE: 111 BPM | SYSTOLIC BLOOD PRESSURE: 121 MMHG | DIASTOLIC BLOOD PRESSURE: 71 MMHG

## 2020-11-18 LAB
GLUCOSE URINE, POC: NEGATIVE
PROTEIN UA: ABNORMAL

## 2020-11-18 PROCEDURE — 0502F SUBSEQUENT PRENATAL CARE: CPT | Performed by: OBSTETRICS & GYNECOLOGY

## 2020-11-18 PROCEDURE — 81002 URINALYSIS NONAUTO W/O SCOPE: CPT | Performed by: OBSTETRICS & GYNECOLOGY

## 2020-11-18 PROCEDURE — 99213 OFFICE O/P EST LOW 20 MIN: CPT | Performed by: OBSTETRICS & GYNECOLOGY

## 2020-11-18 NOTE — PROGRESS NOTES
Patient alert and pleasant with no complaints. Here today for prenatal visit. Fetal heart tones obtained without difficulty. Urine for glucose obtained with negative results. Urine for protein obtained with +1 results  Discharge instructions have been discussed with the patient. Patient advised to call our office with any questions or concerns. Voiced understanding.

## 2020-11-18 NOTE — PROGRESS NOTES
Tammi De La Rosa    Patient presents for routine prenatal visit today at 33w6d. Follows with MFM. Getting twice weekly NSTs. Still diet controlled GDM. Next appointment is 11/19/20. Per [facility], patient can be induced at 38 weeks. Induction scheduled for 12/17/20 at 6am.     Denies complaints  Denies VB, or cramping  Feeling movement at this time. Reviewed above. Fetal Heart Rate: 130    All questions and concerns addressed at this time. ICD-10-CM    1. Thrombophilia affecting pregnancy in third trimester, antepartum ([facility]  O99.113     D68.59    2. Gestational diabetes mellitus (GDM) affecting pregnancy, antepartum  O24.419    3. Recurrent pregnancy loss in pregnant patient in third trimester, antepartum  O26.23    4. Prenatal care in third trimester  Z34.93    5. History of stillbirth in pregnant patient in third trimester, antepartum  O09.293    6. Thrombophilia affecting pregnancy in second trimester, antepartum ([facility]  O99.112     D68.59         Return in about 1 week (around 11/25/2020) for OB visit.     Navdeep Hartley MD

## 2020-11-19 ENCOUNTER — ROUTINE PRENATAL (OUTPATIENT)
Dept: OBGYN CLINIC | Age: 30
End: 2020-11-19
Payer: COMMERCIAL

## 2020-11-19 VITALS
HEART RATE: 107 BPM | WEIGHT: 206.9 LBS | BODY MASS INDEX: 35.32 KG/M2 | HEIGHT: 64 IN | DIASTOLIC BLOOD PRESSURE: 73 MMHG | TEMPERATURE: 97.2 F | SYSTOLIC BLOOD PRESSURE: 110 MMHG

## 2020-11-19 PROBLEM — Z3A.34 34 WEEKS GESTATION OF PREGNANCY: Status: ACTIVE | Noted: 2020-11-19

## 2020-11-19 LAB
GLUCOSE URINE, POC: NEGATIVE
PROTEIN UA: ABNORMAL

## 2020-11-19 PROCEDURE — 81002 URINALYSIS NONAUTO W/O SCOPE: CPT | Performed by: OBSTETRICS & GYNECOLOGY

## 2020-11-19 PROCEDURE — 99213 OFFICE O/P EST LOW 20 MIN: CPT | Performed by: OBSTETRICS & GYNECOLOGY

## 2020-11-19 PROCEDURE — 76820 UMBILICAL ARTERY ECHO: CPT | Performed by: OBSTETRICS & GYNECOLOGY

## 2020-11-19 PROCEDURE — 76818 FETAL BIOPHYS PROFILE W/NST: CPT | Performed by: OBSTETRICS & GYNECOLOGY

## 2020-11-19 PROCEDURE — 76816 OB US FOLLOW-UP PER FETUS: CPT | Performed by: OBSTETRICS & GYNECOLOGY

## 2020-11-19 NOTE — PROGRESS NOTES
Nst completed. Baseline 140  with moderate variabilty+ accelelrations.  occ ctx noted Reactive per dr Selin Carvajal

## 2020-11-19 NOTE — PATIENT INSTRUCTIONS
Please arrive for your scheduled appointment at least 15 minutes early with your actual insurance card+ a photo ID. Also if you need any refills ordered or have questions, it may take up 48 hours to reply. Please allow ample time for your refills. Call me when you use last refill. Thank you for your cooperation. Any questions contact [facility] at [phone]. If you are experiencing an emergency and need immediate help, call 911 or go to go emergency room or labor and delivery. if you are sick, not feeling well or have an infectious process going on please reschedule your appointment by calling [phone]. Also if any family members are not feeling well, please do not bring them to your appointment. We appreciate your cooperation. We are doing this in order to protect our pregnant mothers+ their babies. Call your primary obstetrician with bleeding, leaking of fluid, abdominal tenderness, headache, blurry vision, epigastric pain and increased urinary frequency. Do kick counts after dinner. Call your primary obstetrician if less than 10 kicks in 2 hours after dinner. Call your primary obstetrician with bleeding, leaking of fluid, abdominal tenderness, headache, blurry vision, epigastric pain and increased urinary frequency. You might be having an NST at your next appt. Please eat a large snack or breakfast before coming to office. Thank you  Patient Education        Weeks 34 to 39 of Your Pregnancy: Care Instructions  Your Care Instructions     By now, your baby and your belly have grown quite large. It is almost time to give birth. Your baby's lungs are almost ready to breathe air. The bones in your baby's head are now firm enough to protect it, but soft enough to move down through the birth canal.  You may feel excited, happy, anxious, or scared. You may wonder how you will know if you are in labor or what to expect during labor. Try to be flexible in your expectations of the birth.  Because each birth is different, there is no way to know exactly what childbirth will be like for you. This care sheet will help you know what to expect and how to prepare. This may make your childbirth easier. If you haven't already had the Tdap shot during this pregnancy, talk to your doctor about getting it. It will help protect your  against pertussis infection. In the 36th week, most women have a test for group B streptococcus (GBS). GBS is a common bacteria that can live in the vagina and rectum. It can make your baby sick after birth. If you test positive, you will get antibiotics during labor. The medicine will keep your baby from getting the bacteria. Follow-up care is a key part of your treatment and safety. Be sure to make and go to all appointments, and call your doctor if you are having problems. It's also a good idea to know your test results and keep a list of the medicines you take. How can you care for yourself at home? Learn about pain relief choices  · Pain is different for every woman. Talk with your doctor about your feelings about pain. · You can choose from several types of pain relief. These include medicine or breathing techniques, as well as comfort measures. You can use more than one option. · If you choose to have pain medicine during labor, talk to your doctor about your options. Some medicines lower anxiety and help with some of the pain. Others make your lower body numb so that you won't feel pain. · Be sure to tell your doctor about your pain medicine choice before you start labor or very early in your labor. You may be able to change your mind as labor progresses. · Rarely, a woman is put to sleep by medicine given through a mask or an IV. Labor and delivery  · The first stage of labor has three parts: early, active, and transition. ? Most women have early labor at home. You can stay busy or rest, eat light snacks, drink clear fluids, and start counting contractions. ?  When talking during a contraction gets hard, you may be moving to active labor. During active labor, you should head for the hospital if you are not there already. ? You are in active labor when contractions come every 3 to 4 minutes and last about 60 seconds. Your cervix is opening more rapidly. ? If your water breaks, contractions will come faster and stronger. ? During transition, your cervix is stretching, and contractions are coming more rapidly. ? You may want to push, but your cervix might not be ready. Your doctor will tell you when to push. · The second stage starts when your cervix is completely opened and you are ready to push. ? Contractions are very strong to push the baby down the birth canal.  ? You will feel the urge to push. You may feel like you need to have a bowel movement. ? You may be coached to push with contractions. These contractions will be very strong, but you will not have them as often. You can get a little rest between contractions. ? You may be emotional and irritable. You may not be aware of what is going on around you.  ? One last push, and your baby is born. · The third stage is when a few more contractions push out the placenta. This may take 30 minutes or less. · The fourth stage is the welcome recovery. You may feel overwhelmed with emotions and exhausted but alert. This is a good time to start breastfeeding. Where can you learn more? Go to https://Kahubmary kay.healthCambridge Temperature Concepts. org and sign in to your Curves account. Enter A687 in the Errplane box to learn more about \"Weeks 34 to 36 of Your Pregnancy: Care Instructions. \"     If you do not have an account, please click on the \"Sign Up Now\" link. Current as of: February 11, 2020               Content Version: 12.6  © 9769-6489 [employer]. Care instructions adapted under license by [facility] [employer] [facility] ([facility]).  If you have questions about a medical condition or this instruction, always ask your healthcare professional. vaginal discharge that smells bad. · You have skin changes, such as:  ? A rash. ? Itching. ? Yellow color to your skin. · You have other concerns about your pregnancy. If you have labor signs at 37 weeks or more  If you have signs of labor at 37 weeks or more, your doctor may tell you to call when your labor becomes more active. Symptoms of active labor include:  · Contractions that are regular. · Contractions that are less than 5 minutes apart. · Contractions that are hard to talk through. Follow-up care is a key part of your treatment and safety. Be sure to make and go to all appointments, and call your doctor if you are having problems. It's also a good idea to know your test results and keep a list of the medicines you take. Where can you learn more? Go to https://Verax BiomedicalpepicGemmyoeb.ProVox Technologies. org and sign in to your [employer]Sh[employer] account. Enter  in the [employer] Online box to learn more about \"Learning About When to Call Your Doctor During Pregnancy (After 20 Weeks). \"     If you do not have an account, please click on the \"Sign Up Now\" link. Current as of: February 11, 2020               Content Version: 12.6  © 7527-3298 [employer]. Care instructions adapted under license by [facility] ([facility]). If you have questions about a medical condition or this instruction, always ask your healthcare professional. [place] [address] any warranty or liability for your use of this information. Patient Education        Counting Your Baby's Kicks: Care Instructions  Your Care Instructions     Counting your baby's kicks is one way your doctor can tell that your baby is healthy. Most women--especially in a first pregnancy--feel their baby move for the first time between 16 and 22 weeks. The movement may feel like flutters rather than kicks. Your baby may move more at certain times of the day. When you are active, you may notice less kicking than when you are resting.  At your prenatal visits, your doctor will ask whether the baby is active. In your last trimester, your doctor may ask you to count the number of times you feel your baby move. Follow-up care is a key part of your treatment and safety. Be sure to make and go to all appointments, and call your doctor if you are having problems. It's also a good idea to know your test results and keep a list of the medicines you take. How do you count fetal kicks? · A common method of checking your baby's movement is to count the number of kicks or moves you feel in 1 hour. Ten movements (such as kicks, flutters, or rolls) in 1 hour are normal. Some doctors suggest that you count in the morning until you get to 10 movements. Then you can quit for that day and start again the next day. · Pick your baby's most active time of day to count. This may be any time from morning to evening. · If you do not feel 10 movements in an hour, your baby may be sleeping. Wait for the next hour and count again. When should you call for help? Call your doctor now or seek immediate medical care if:    · You noticed that your baby has stopped moving or is moving much less than normal.   Watch closely for changes in your health, and be sure to contact your doctor if you have any problems. Where can you learn more? Go to https://MTPVkenyonOnce Innovations.Swapsee. org and sign in to your SheZoom account. Enter B018 in the Pathfinder Technologies box to learn more about \"Counting Your Baby's Kicks: Care Instructions. \"     If you do not have an account, please click on the \"Sign Up Now\" link. Current as of: February 11, 2020               Content Version: 12.6  © 2229-5472 [employer]orporated. Care instructions adapted under license by [facility] ([facility]). If you have questions about a medical condition or this instruction, always ask your healthcare professional. [address] any warranty or liability for your use of this information. Patient Education        Diabetes Blood Sugar Emergencies: Your Action Plan  How can you prevent a blood sugar emergency? An important part of living with diabetes is keeping your blood sugar in your target range. You'll need to know what to do if it's too high or too low. Managing your blood sugar levels helps you avoid emergencies. This care sheet will teach you about the signs of high and low blood sugar. It will help you make an action plan with your doctor for when these signs occur. Low blood sugar is more likely to happen if you take certain medicines for diabetes. It can also happen if you skip a meal, drink alcohol, or exercise more than usual.  You may get high blood sugar if you eat differently than you normally do. One example is eating more carbohydrate than usual. Having a cold, the flu, or other sudden illness can also cause high blood sugar levels. Levels can also rise if you miss a dose of medicine. Any change in how you take your medicine may affect your blood sugar level. So it's important to work with your doctor before you make any changes. Check your blood sugar  Work with your doctor to fill in the blank spaces below that apply to you. Track your levels, know your target range, and write down ways you can get your blood sugar back in your target range. A log book can help you track your levels. Take the book to all of your medical appointments. · Check your blood sugar _____ times a day, at these times:________________________________________________. (For example: Before meals, at bedtime, before exercise, during exercise, other.)  · Your blood sugar target range before a meal is ___________________. Your blood sugar target range after a meal is _______________________. · Do this--___________________________________________________--to get your blood sugar back within your safe range if your blood sugar results are _________________________________________.  (For example: Less than 70 or above 250 mg/dL.)  Call your doctor when your blood sugar results are ___________________________________. (For example: Less than 70 or above 250 mg/dL.)  What are the symptoms of low and high blood sugar? Common symptoms of low blood sugar are sweating and feeling shaky, weak, hungry, or confused. Symptoms can start quickly. Common symptoms of high blood sugar are feeling very thirsty or very hungry. You may also pass urine more often than usual. You may have blurry vision and may lose weight without trying. But some people may have high or low blood sugar without having any symptoms. That's a good reason to check your blood sugar on a regular schedule. What should you do if you have symptoms? Work with your doctor to fill in the blank spaces below that apply to you. Low blood sugar  If you have symptoms of low blood sugar, check your blood sugar. If it's below _____ ( for example, below 70), eat or drink a quick-sugar food that has about 15 grams of carbohydrate. Your goal is to get your level back to your safe range. Check your blood sugar again 15 minutes later. If it's still not in your target range, take another 15 grams of carbohydrate and check your blood sugar again in 15 minutes. Repeat this until you reach your target. Then go back to your regular testing schedule. Children usually need less than 15 grams of carbohydrate. Check with your doctor or diabetes educator for the amount that is right for your child. When you have low blood sugar, it's best to stop or reduce any physical activity until your blood sugar is back in your target range and is stable. If you must stay active, eat or drink 30 grams of carbohydrate. Then check your blood sugar again in 15 minutes. If it's not in your target range, take another 30 grams of carbohydrates. Check your blood sugar again in 15 minutes. Keep doing this until you reach your target. You can then go back to your regular testing schedule.   If your symptoms or blood sugar levels are getting worse or have not improved after 15 minutes, seek medical care right away. Here are some examples of quick-sugar foods with 15 grams of carbohydrate:  · 3 or 4 glucose tablets  · 1 tablespoon (3 teaspoons) table sugar  · ½ cup to ¾ cup (4 to 6 ounces) of fruit juice or regular (not diet) soda  · Hard candy (such as 6 Life Savers)  High blood sugar  If you have symptoms of high blood sugar, check your blood sugar. Your goal is to get your level back to your target range. If it's above ______ ( for example, above 250), follow these steps:  · If you missed a dose of your diabetes medicine, take it now. Take only the amount of medicine that you have been prescribed. Do not take more or less medicine. · Give yourself insulin if your doctor has prescribed it for high blood sugar. · Test for ketones, if the doctor told you to do so. If the results of the ketone test show a moderate-to-large amount of ketones, call the doctor for advice. · Wait 30 minutes after you take the extra insulin or the missed medicine. Check your blood sugar again. If your symptoms or blood sugar levels are getting worse or have not improved after taking these steps, seek medical care right away. Follow-up care is a key part of your treatment and safety. Be sure to make and go to all appointments, and call your doctor if you are having problems. It's also a good idea to know your test results and keep a list of the medicines you take. Where can you learn more? Go to https://Let's Talkmary kay.CoinPass. org and sign in to your Quantitative Medicine account. Enter S368 in the [facility] box to learn more about \"Diabetes Blood Sugar Emergencies: Your Action Plan. \"     If you do not have an account, please click on the \"Sign Up Now\" link. Current as of: December 20, 2019               Content Version: 12.6  © 4261-1658 Netshow.me, Incorporated.    Care instructions adapted under license by [id number] [employer] [facility]. If you have questions about a medical condition or this instruction, always ask your healthcare professional. [place] [address] any warranty or liability for your use of this information.

## 2020-11-19 NOTE — LETTER
20    Bruno Villareal, [address] [facility], [address] [place]                RE: Silvia Feng  : 1990   AGE: 26 y.o.     This report has been created using voice recognition software. It may contain errors which are inherent in voice recognition technology.     Dear Dr. 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 o. female, who is G6(2,0,3,1). She has an Estimated Date of Delivery: 20 based on her LMP and previous ultrasound assessment. She is currently 34 weeks 0 days gestation based on that assessment.      The patient has had no major problems since her last visit. She states that her fetal movement has been good. She has had no increased vaginal discharge, vaginal bleeding, back pain, dysuria, hematuria, or leaking of amniotic fluid.     I reviewed the patient's home blood sugar monitoring results. Her fasting blood sugars have been intermittently elevated.  Her postprandial blood sugars are generally less than 120 unless she deviates from her carbohydrate controlled diet.      The patient had 2 prior spontaneous first trimester pregnancy losses.  One of the pregnancies had no yolk sac or embryo identified.  She had a positive pregnancy test which subsequently became negative.     The patient terminated a pregnancy in the first trimester as a result of suspected trisomy 13 based on a chorionic villous sampling.  When the full karyotype analysis was completed it was found that this fetus actually had a normal karyotype.    The patient had a stillbirth with her first pregnancy at 45 weeks 5 days gestational age. She stated that no specific cause was determined. She had no history of medical or surgical disease. She had no history of trauma. She stated that she had decreased fetal movement on the Sunday prior to the Monday on which the baby was confirmed to be demised. No autopsy was performed. The patient stated that the baby's karyotype was normal, however the fetal skin biopsy sample was reported as not able to have a karyotype completed.      The results of [place] screen were negative. I advised her that this a screening test not a diagnostic test. I advised her that the test screens only for trisomy 21, 18 and 13. We discussed the sensitivity of the test which I advised the patient is as follows:       99.99% for detection of trisomy 21 with a specificity of 99.9%     99.99% for trisomy 25 with a specificity of 99.6%     99.99% for trisomy 15 with a specificity of 99.7%      She understands that the [facility] testing does not replace diagnostic genetic testing. She understands the limitations of this testing, including, but not limited to, not detecting partial fetal karyotype abnormalities, and in some cases, limited information regarding unbalanced translocations.  The test is also limited in that the results may not reflect chromosomes of the fetus due to confined placental mosaicism or chromosomal changes in the mother.  The test is validated for single and twin pregnancies with a gestational age of at least 9 weeks.  Chromosomal mosaicism cannot be distinguished, and in some cases, not detected by this method.  A negative test does not eliminate the possibility of fetal chromosome abnormalities in regions tested or and other areas of the genome.  The tests cannot rule out other genetic diseases or birth defects, including open neural tube defects.     A fetal ultrasound evaluation was performed on November 5, 2020.  A detailed report included under the media tab.  A living bae intrauterine fetus was identified in the cephalic presentation, with normal fetal heart motion and normal fetal motion noted.  The placenta was on the posterior and right lateral aspects of the uterus.  The amniotic fluid index was 15.5 cm.  The estimated fetal weight was at the 65th growth percentile for gestational age.  Visualization of the fetal anatomy was somewhat limited secondary to poor acoustic transmission to the patient's anterior abdominal wall in the fetal position. The biophysical profile and cord Doppler studies were both reassuring. There was no evidence of absence, or reversal of end-diastolic flow.     The nonstress test today was reactive.  Irregular, symptomatic uterine contractions were present.       A fetal ultrasound evaluation was performed on November 19, 2020.  A detailed report included under the media tab.  A living bae intrauterine fetus was identified in the cephalic presentation, with normal fetal heart motion and normal fetal motion noted.  The placenta was on the posterior and right lateral aspects of the uterus.  The amniotic fluid index was 16.1 cm.  The estimated fetal weight was at the 61st growth percentile for gestational age.  Visualization of the fetal anatomy was somewhat limited secondary to poor acoustic transmission to the patient's anterior abdominal wall in the fetal position. The biophysical profile and cord Doppler studies were both reassuring.  There was no evidence of absence, or reversal of end-diastolic flow.                          GENETIC SCREENING/TERATOLOGY COUNSELING                      (Includes patient, FTB, and any affected family members)     Patient Age > 35 Years NO   Thalassemia ( MVC<80) NO   Congential Heart Defect NO   Neural Tube Defect NO   Blu-Sachs NO   Sickle Cell Disease NO   Sickle Cell Trait NO   Sickle C Disease or Trait NO   Hemophilia NO   Muscular Dystrophy NO Cystic Fibrosis NO   Cr Disease NO   Autism NO   Mental Retardation NO   History of Fragile X NO   Maternal Diabetes NO   Other Genetic Disease or Syndrome NO   Previous Child With Congenital Abnormality Not Listed NO   Recreational Drugs NO                                                INFECTION HISTORY          HEPATITIS IMMUNIZED:  YES   HEPATITIS INFECTION:  NO   EXPOSURE TO TB NO   GENITAL HERPES    NO   PARVOVIRUS B-19 NO   CHICKEN POX  YES   MEASLES NO   STD NO   HIV NO   OTHER RASH OR VIRAL ILLNESS SINCE LMP NO   UTI RECURRENT NO   HPV NO      OB History    Para Term  AB Living   6 2 2   3 1   SAB TAB Ectopic Molar Multiple Live Births     2 1       1       # Outcome Date GA Lbr Herson/2nd Weight Sex Delivery Anes PTL Lv   6 Current                     5 TAB 2019                   4 2019                   3  2019                   2 Term 17 37w2d   6 lb 9 oz (2.977 kg) F Vag-Spont EPI N DARIUS   1 Term 16 38w5d   6 lb 10 oz (3.005 kg) F Vag-Spont EPI N FD      PAST GYNECOLOGICAL  HISTORY:  Positive for abnormal pap smears. Doesn't know the grade  Negative for cervical LEEP / conization /cryosurgery.    Negative for uterine surgery.    Negative for ovarian or tubal surgery.      Past Medical History:   Diagnosis Date    Abnormal Pap smear of cervix      will repeat after delivery    Gestational diabetes mellitus (GDM) affecting pregnancy, antepartum 2020         Past Surgical History:   Procedure Laterality Date    DILATION AND CURETTAGE   2019     X 2    MASTOIDECTOMY        TYMPANOSTOMY TUBE PLACEMENT          No Known Allergies     Current Outpatient Medications:     vitamin B-6 (PYRIDOXINE) 50 MG tablet, Take 50 mg by mouth daily    aspirin 81 MG chewable tablet, Take 81 mg by mouth daily     diphenhydrAMINE HCl, Sleep, (UNISOM SLEEPMELTS PO), Take by mouth    Prenatal Vit-Fe Fumarate-FA (PRENATAL PO), Take by mouth     Social History      Tobacco Use  Smoking status: Former Smoker       Packs/day: 0.20       Types: Cigarettes       Last attempt to quit: 2014       Years since quittin.4    Smokeless tobacco: Never Used   Substance Use Topics    Alcohol use: No      FAMILY MEDICAL HISTORY:   Negative for congenital abnormalities, autism, genetic disease and mental retardation, not listed above.      Review of Systems :   CONSTITUTIONAL : No fever, no chills   HEENT : No headache, no visual changes, no rhinorrhea, no sore throat   CARDIOVASCULAR : No pain, no palpitations, no edema   RESPIRATORY : No pain, no shortness of breath   GASTROINTESTINAL : No N/V, no D/C, no abdominal pain   GENITOURINARY : No dysuria, hematuria and no incontinence   MUSCULOSKELETAL : No myalgia, No back pain  NEUROLOGICAL : No numbness, no tingling, no tremors. No history of seizures  ALL OTHER SYSTEMS WERE REPORTED AS NEGATIVE.     PERTINENT PHYSICAL EXAMINATION:   /73   Pulse 107   Temp 97.2 °F (36.2 °C) (Temporal)   Ht 5' 4\" (1.626 m)   Wt 206 lb 14.4 oz (93.8 kg)   LMP 2020   BMI 35.51 kg/m²   Urine dipstick:   Negative Glucose  1+ protein     GENERAL:   The patient is a well developed, female who is alert cooperative and oriented times three in no acute distress.     HEENT:  Normo cephalic and atraumatic. No facial edema.      ABDOMEN:   Her uterus is gravid. She had no complaint of abdominal pain or tenderness. The fetal heart rate is 141 bpm. The fetus is in the cephalic presentation which was confirmed by the ultrasound assessment.     EXTREMITIES:  No peripheral edema is noted.      A fetal ultrasound assessment was performed today.  A report is enclosed for your review.     IMPRESSION:    1.  IUP at 34 weeks 0 days gestation based on her Estimated Date of Delivery: 20    2.  Gestational diabetes versus type 2 diabetes.    3.  History of a prior stillbirth at 37 weeks 5 days gestational age [place] of 2 spontaneous first trimester pregnancy losses    5.  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    6.  First trimester NT measurement was at the 65th growth percentile    7.  First trimester nasal bone visualization    8.  [place] screen showed no increased risk for fetal aneuploidy for the chromosomes assessed.    9.  Subchorionic hematoma, [id number]/2020    10.  Normal cervical length 2020  11.  Estimated fetal weight was at the 61st growth percentile 2020   12.  Reassuring biophysical profile and cord Doppler testing 2020  13.  Uterine contractions without cervical change 2020   14.  Reactive nonstress test 2020     PLAN:  I would recommend that the patient count fetal movements and call if she notices any subjective decrease in fetal movements, particularly if there are less than 10 major movements in an hour. Non-stress testing should be performed every 3 to 4 days through the balance of the pregnancy. Serial ultrasounds to assess fetal anatomy and growth should be performed. The patient is at increase risk for  morbidity and mortality secondary to her history. Weekly BPP and cord Doppler testing should be performed, unless there is a clinical indication to perform the testing more frequently.     I advised the patient to continue to do home glucose monitoring. She is to check her blood sugars fasting and 2 hours after each meal. Her fasting blood sugars should be less than 92 mg/dl and her 2 hour post prandial blood sugars less than 120 mg/dl. She is to call if her blood sugars are outside of these parameters.     The patient was advised to call if she has any increased vaginal discharge, vaginal bleeding, contractions, abdominal pain, back pain or any new significant symptomatology prior to her next visit.  I advised her that these are signs and symptoms of cervical change and require follow-up assessment when they occur.     I requested the patient return for a follow-up assessment in 4 days unless there is a clinical reason for her to return prior to that time. She is to call if she has any problems or questions prior to her next visit. Further evaluation and management will be dependent on her clinical presentation and the results of her testing.      The patient is to continue to follow with you in your office for ongoing obstetric care.     The total time in minutes spent with the patient, reviewing medical records, reviewing imaging studies, performing ultrasonic imaging, reviewing laboratory testing, and documenting information was 15 minutes, of which, 50% of the time was spent in patient education, counseling, and coordinating care with the patient and/or her family. Torie Berry discussed my recommendations for management of her blood sugars during pregnancy. We reviewed the results of her ultrasound assessment and fetal testing performed today.  I discussed my recommendations for ongoing evaluation and management of her pregnancy.  I answered all of her questions to her satisfaction.  I asked her to call if she had any additional questions prior to her next visit.       If you have any questions regarding her management, please contact me at your convenience and thank you for allowing me to participate in her care.     Sincerely,           Brennan Ogden MD, [address], Annie Yanez, [address] [facility], [address] BREANNA  Director [address] [place] [address]  [phone]

## 2020-11-24 ENCOUNTER — ROUTINE PRENATAL (OUTPATIENT)
Dept: OBGYN | Age: 30
End: 2020-11-24
Payer: COMMERCIAL

## 2020-11-24 ENCOUNTER — HOSPITAL ENCOUNTER (OUTPATIENT)
Age: 30
Discharge: HOME | End: 2020-11-24
Attending: OBSTETRICS & GYNECOLOGY | Admitting: OBSTETRICS & GYNECOLOGY
Payer: COMMERCIAL

## 2020-11-24 VITALS
RESPIRATION RATE: 16 BRPM | DIASTOLIC BLOOD PRESSURE: 65 MMHG | SYSTOLIC BLOOD PRESSURE: 116 MMHG | TEMPERATURE: 98.1 F | HEART RATE: 90 BPM

## 2020-11-24 VITALS
DIASTOLIC BLOOD PRESSURE: 68 MMHG | WEIGHT: 207 LBS | HEART RATE: 107 BPM | SYSTOLIC BLOOD PRESSURE: 116 MMHG | BODY MASS INDEX: 35.53 KG/M2

## 2020-11-24 LAB
GLUCOSE URINE, POC: NEGATIVE
PROTEIN UA: ABNORMAL

## 2020-11-24 PROCEDURE — 59025 FETAL NON-STRESS TEST: CPT

## 2020-11-24 PROCEDURE — 99213 OFFICE O/P EST LOW 20 MIN: CPT | Performed by: OBSTETRICS & GYNECOLOGY

## 2020-11-24 PROCEDURE — 59025 FETAL NON-STRESS TEST: CPT | Performed by: OBSTETRICS & GYNECOLOGY

## 2020-11-24 PROCEDURE — 81002 URINALYSIS NONAUTO W/O SCOPE: CPT | Performed by: OBSTETRICS & GYNECOLOGY

## 2020-11-24 PROCEDURE — 59025 FETAL NON-STRESS TEST: CPT | Performed by: NURSE PRACTITIONER

## 2020-11-24 PROCEDURE — 99211 OFF/OP EST MAY X REQ PHY/QHP: CPT

## 2020-11-24 NOTE — PROGRESS NOTES
NON STRESS TEST INTERPRETATION    20    RE:  Nilo Bello   : 1990   AGE: 26 y.o. GESTATIONAL AGE:  34w5d      INDICATION:  Gestational diabetes, diet controlled     TIME ON:  1404      TIME OFF:  1433      RESULT:   n/a      FHR Baseline Rate:   155 bpm    PERIODIC CHANGES:    · Unable to keep baby on the monitor due to fetal movement. Multiple attempts made at positioning fetal monitor/ holding in place. Patient will go to L&D for NST.      Chata Weller MD

## 2020-11-24 NOTE — PROGRESS NOTES
Patient is [id number] last menstrual period was 03/26/2020.,  34w5d here for NST.     Estimated Date of Delivery: 12/31/20    Reason for NST:gestational dm diet controlled    /65   Pulse 90   Temp 98.1 °F (36.7 °C) (Oral)   Resp 16   LMP 03/26/2020     FHR:120,  Variability: moderate,   Accelerations: present, Decelerations: none    Assessment NST is Reactive

## 2020-11-24 NOTE — PROGRESS NOTES
Patient alert and pleasant with no complaints. Here today for prenatal visit. Fetal heart tones obtained without difficulty. Urine for glucose obtained with negative results. Urine for protein obtained with +1 results. Discharge instructions have been discussed with the patient. Patient advised to call our office with any questions or concerns. Voiced understanding. Attempted to completed NST with difficulty   Unable to keep baby on monitor. Baby moving quite a bit during NST .  Dr. Tati Gamez did assist and try to keep baby on monitor but was unsuccessful   Patient sent straight to L&D

## 2020-11-27 ENCOUNTER — ROUTINE PRENATAL (OUTPATIENT)
Dept: OBGYN CLINIC | Age: 30
End: 2020-11-27
Payer: COMMERCIAL

## 2020-11-27 VITALS
HEIGHT: 64 IN | HEART RATE: 103 BPM | WEIGHT: 212.4 LBS | SYSTOLIC BLOOD PRESSURE: 110 MMHG | TEMPERATURE: 97.2 F | DIASTOLIC BLOOD PRESSURE: 71 MMHG | BODY MASS INDEX: 36.26 KG/M2

## 2020-11-27 PROBLEM — Z3A.35 35 WEEKS GESTATION OF PREGNANCY: Status: ACTIVE | Noted: 2020-11-27

## 2020-11-27 LAB
GLUCOSE URINE, POC: NEGATIVE
PROTEIN UA: ABNORMAL

## 2020-11-27 PROCEDURE — 99213 OFFICE O/P EST LOW 20 MIN: CPT | Performed by: OBSTETRICS & GYNECOLOGY

## 2020-11-27 PROCEDURE — 81002 URINALYSIS NONAUTO W/O SCOPE: CPT | Performed by: OBSTETRICS & GYNECOLOGY

## 2020-11-27 PROCEDURE — 76815 OB US LIMITED FETUS(S): CPT | Performed by: OBSTETRICS & GYNECOLOGY

## 2020-11-27 PROCEDURE — 76818 FETAL BIOPHYS PROFILE W/NST: CPT | Performed by: OBSTETRICS & GYNECOLOGY

## 2020-11-27 PROCEDURE — 76820 UMBILICAL ARTERY ECHO: CPT | Performed by: OBSTETRICS & GYNECOLOGY

## 2020-11-27 NOTE — PATIENT INSTRUCTIONS
Patient Education        Weeks 34 to 39 of Your Pregnancy: Care Instructions  Your Care Instructions     By now, your baby and your belly have grown quite large. It is almost time to give birth. Your baby's lungs are almost ready to breathe air. The bones in your baby's head are now firm enough to protect it, but soft enough to move down through the birth canal.  You may feel excited, happy, anxious, or scared. You may wonder how you will know if you are in labor or what to expect during labor. Try to be flexible in your expectations of the birth. Because each birth is different, there is no way to know exactly what childbirth will be like for you. This care sheet will help you know what to expect and how to prepare. This may make your childbirth easier. If you haven't already had the Tdap shot during this pregnancy, talk to your doctor about getting it. It will help protect your  against pertussis infection. In the 36th week, most women have a test for group B streptococcus (GBS). GBS is a common bacteria that can live in the vagina and rectum. It can make your baby sick after birth. If you test positive, you will get antibiotics during labor. The medicine will keep your baby from getting the bacteria. Follow-up care is a key part of your treatment and safety. Be sure to make and go to all appointments, and call your doctor if you are having problems. It's also a good idea to know your test results and keep a list of the medicines you take. How can you care for yourself at home? Learn about pain relief choices  · Pain is different for every woman. Talk with your doctor about your feelings about pain. · You can choose from several types of pain relief. These include medicine or breathing techniques, as well as comfort measures. You can use more than one option. · If you choose to have pain medicine during labor, talk to your doctor about your options.  Some medicines lower anxiety and help with some of the pain. Others make your lower body numb so that you won't feel pain. · Be sure to tell your doctor about your pain medicine choice before you start labor or very early in your labor. You may be able to change your mind as labor progresses. · Rarely, a woman is put to sleep by medicine given through a mask or an IV. Labor and delivery  · The first stage of labor has three parts: early, active, and transition. ? Most women have early labor at home. You can stay busy or rest, eat light snacks, drink clear fluids, and start counting contractions. ? When talking during a contraction gets hard, you may be moving to active labor. During active labor, you should head for the hospital if you are not there already. ? You are in active labor when contractions come every 3 to 4 minutes and last about 60 seconds. Your cervix is opening more rapidly. ? If your water breaks, contractions will come faster and stronger. ? During transition, your cervix is stretching, and contractions are coming more rapidly. ? You may want to push, but your cervix might not be ready. Your doctor will tell you when to push. · The second stage starts when your cervix is completely opened and you are ready to push. ? Contractions are very strong to push the baby down the birth canal.  ? You will feel the urge to push. You may feel like you need to have a bowel movement. ? You may be coached to push with contractions. These contractions will be very strong, but you will not have them as often. You can get a little rest between contractions. ? You may be emotional and irritable. You may not be aware of what is going on around you.  ? One last push, and your baby is born. · The third stage is when a few more contractions push out the placenta. This may take 30 minutes or less. · The fourth stage is the welcome recovery. You may feel overwhelmed with emotions and exhausted but alert. This is a good time to start breastfeeding.   Where [place].)  · You think that you may be in labor. This means that you've had at least 6 contractions in an hour. · You notice that your baby has stopped moving or is moving much less than normal.  · You have symptoms of a urinary tract infection. These may include:  ? Pain or burning when you urinate. ? A frequent need to urinate without being able to pass much urine. ? Pain in the flank, which is just below the rib cage and above the waist on either side of the back. ? Blood in your urine. Watch closely for changes in your health, and be sure to contact your doctor if:  · You have vaginal discharge that smells bad. · You have skin changes, such as:  ? A rash. ? Itching. ? Yellow color to your skin. · You have other concerns about your pregnancy. If you have labor signs at 37 weeks or more  If you have signs of labor at 37 weeks or more, your doctor may tell you to call when your labor becomes more active. Symptoms of active labor include:  · Contractions that are regular. · Contractions that are less than 5 minutes apart. · Contractions that are hard to talk through. Follow-up care is a key part of your treatment and safety. Be sure to make and go to all appointments, and call your doctor if you are having problems. It's also a good idea to know your test results and keep a list of the medicines you take. Where can you learn more? Go to [email or web address] [email or web address]. org and sign in to your [employer] account. Enter  in the [facility] box to learn more about \"Learning About When to Call Your Doctor During Pregnancy (After 20 Weeks). \"     If you do not have an account, please click on the \"Sign Up Now\" link. Current as of: February 11, 2020               Content Version: 12.6  © 3365-8934 [employer]. Care instructions adapted under license by [facility] ([facility]).  If you have questions about a medical condition or this instruction, always ask your healthcare professional. your [employer] account. Enter O310 in the [facility] box to learn more about \"Counting Your Baby's Kicks: Care Instructions. \"     If you do not have an account, please click on the \"Sign Up Now\" link. Current as of: February 11, 2020               Content Version: 12.6  © 1238-6243 [employer]. Care instructions adapted under license by [facility] ([facility]). If you have questions about a medical condition or this instruction, always ask your healthcare professional. [address] any warranty or liability for your use of this information.

## 2020-11-27 NOTE — PROGRESS NOTES
States baby is active Denies bleeding leakage of fluid or contractions. Doing daily kick count    nst test started @09:27  Fh 145, irritability noted,DR Babita Grover discussed with patient  Mom marking fetal movement  Moderate variability noted, accelerations noted  Reviewed ended @ 09:54    Call your obstetrician for:    Bleeding, leakage of fluid, abdominal tenderness, headaches, burred vision or  epigastric pain or decreased fetal movement or increased in urinary frequency.    Call if any questions or concerns

## 2020-12-01 ENCOUNTER — ROUTINE PRENATAL (OUTPATIENT)
Dept: OBGYN | Age: 30
End: 2020-12-01
Payer: COMMERCIAL

## 2020-12-01 VITALS — WEIGHT: 213 LBS | BODY MASS INDEX: 36.56 KG/M2 | SYSTOLIC BLOOD PRESSURE: 116 MMHG | DIASTOLIC BLOOD PRESSURE: 58 MMHG

## 2020-12-01 DIAGNOSIS — Z34.93 PRENATAL CARE IN THIRD TRIMESTER: ICD-10-CM

## 2020-12-01 LAB
GLUCOSE URINE, POC: NEGATIVE
PROTEIN UA: ABNORMAL

## 2020-12-01 PROCEDURE — 81002 URINALYSIS NONAUTO W/O SCOPE: CPT | Performed by: OBSTETRICS & GYNECOLOGY

## 2020-12-01 PROCEDURE — 59025 FETAL NON-STRESS TEST: CPT | Performed by: OBSTETRICS & GYNECOLOGY

## 2020-12-01 PROCEDURE — 0502F SUBSEQUENT PRENATAL CARE: CPT | Performed by: OBSTETRICS & GYNECOLOGY

## 2020-12-01 NOTE — PROGRESS NOTES
Patient alert and pleasant with no complaints. Here today for prenatal visit. Fetal heart tones obtained without difficulty. Urine for glucose obtained with negative results. Urine for protein obtained with +1 results. Discharge instructions have been discussed with the patient. Patient advised to call our office with any questions or concerns. Voiced understanding. NST completed without difficulty. Placed on monitor 8:22 AM  Ended NST @ 8:46 AM  Results read by Dr. Yemi Morales. See doctors notes.

## 2020-12-01 NOTE — PROGRESS NOTES
Nicolasa Jordan    Patient presents for routine prenatal visit today at 35w5d. Follows with MFM weekly. GDM this pregnancy. Taking baby ASA. Denies complaints  Denies VB, or cramping  Feeling movement at this time. Reviewed labor precautions and kick counts. Fetal Hr: 150    GBS/ Gonorrhea/ chlamydia collected today. All questions and concerns addressed at this time      ICD-10-CM    1. Thrombophilia affecting pregnancy in third trimester, antepartum [facility]  O99.113     D68.59    2. Recurrent pregnancy loss in pregnant patient in third trimester, antepartum  O26.23    3. Gestational diabetes mellitus (GDM) affecting pregnancy, antepartum  O24.419 Fetal nonstress test   4. 35 weeks gestation of pregnancy  Z3A.35    5. Prenatal care in third trimester  Z34.93 POCT urine qual dipstick protein     POCT urine qual dipstick glucose     C.trachomatis N.gonorrhoeae DNA     Strep B screen   6. History of stillbirth in pregnant patient in third trimester, antepartum  O09.293         Return in about 1 week (around 12/8/2020) for OB visit, NST.     Ashok Daly MD

## 2020-12-01 NOTE — PROGRESS NOTES
NON STRESS TEST INTERPRETATION    20    RE:  Kaveh Poag   : 1990   AGE: 26 y.o.     GESTATIONAL AGE:  35w5d      INDICATION:  GDM, history of stillbirth, thrombophilia     TIME ON:  822      TIME OFF:  846      RESULT:   REACTIVE      FHR Baseline Rate:   150 bpm    PERIODIC CHANGES:    · Accelerations present, variability moderate, no decelerations noted    Leah Osborne MD

## 2020-12-03 ENCOUNTER — ROUTINE PRENATAL (OUTPATIENT)
Dept: OBGYN CLINIC | Age: 30
End: 2020-12-03
Payer: COMMERCIAL

## 2020-12-03 VITALS
WEIGHT: 213 LBS | BODY MASS INDEX: 36.56 KG/M2 | HEART RATE: 107 BPM | SYSTOLIC BLOOD PRESSURE: 114 MMHG | DIASTOLIC BLOOD PRESSURE: 72 MMHG

## 2020-12-03 LAB
GLUCOSE URINE, POC: NORMAL
PROTEIN UA: ABNORMAL

## 2020-12-03 PROCEDURE — 76820 UMBILICAL ARTERY ECHO: CPT | Performed by: OBSTETRICS & GYNECOLOGY

## 2020-12-03 PROCEDURE — 81002 URINALYSIS NONAUTO W/O SCOPE: CPT | Performed by: OBSTETRICS & GYNECOLOGY

## 2020-12-03 PROCEDURE — 76818 FETAL BIOPHYS PROFILE W/NST: CPT | Performed by: OBSTETRICS & GYNECOLOGY

## 2020-12-03 PROCEDURE — 76816 OB US FOLLOW-UP PER FETUS: CPT | Performed by: OBSTETRICS & GYNECOLOGY

## 2020-12-03 PROCEDURE — 99212 OFFICE O/P EST SF 10 MIN: CPT | Performed by: OBSTETRICS & GYNECOLOGY

## 2020-12-03 PROCEDURE — 99213 OFFICE O/P EST LOW 20 MIN: CPT | Performed by: OBSTETRICS & GYNECOLOGY

## 2020-12-03 NOTE — PROGRESS NOTES
Nst completed. Baseline 140  with moderate variabilty+ accelelrations.  Discussed  U/s +NST with dr Romeo Pass per dr Tomi Cornell

## 2020-12-03 NOTE — LETTER
The patient had a stillbirth with her first pregnancy at 45 weeks 5 days gestational age. She stated that no specific cause was determined. She had no history of medical or surgical disease. She had no history of trauma. She stated that she had decreased fetal movement on the Sunday prior to the Monday on which the baby was confirmed to be demised. No autopsy was performed. The patient stated that the baby's karyotype was normal, however the fetal skin biopsy sample was reported as not able to have a karyotype completed.      The results of [facility] screen were negative. I advised her that this a screening test not a diagnostic test. I advised her that the test screens only for trisomy 21, 18 and 13. We discussed the sensitivity of the test which I advised the patient is as follows:       99.99% for detection of trisomy 21 with a specificity of 99.9%     99.99% for trisomy 25 with a specificity of 99.6%     99.99% for trisomy 15 with a specificity of 99.7%      She understands that the [facility] testing does not replace diagnostic genetic testing. She understands the limitations of this testing, including, but not limited to, not detecting partial fetal karyotype abnormalities, and in some cases, limited information regarding unbalanced translocations.  The test is also limited in that the results may not reflect chromosomes of the fetus due to confined placental mosaicism or chromosomal changes in the mother.  The test is validated for single and twin pregnancies with a gestational age of at least 9 weeks.  Chromosomal mosaicism cannot be distinguished, and in some cases, not detected by this method.  A negative test does not eliminate the possibility of fetal chromosome abnormalities in regions tested or and other areas of the genome.  The tests cannot rule out other genetic diseases or birth defects, including open neural tube defects.     A fetal ultrasound evaluation was performed on November 5, 2020.  A detailed report included under the media tab.  A living bae intrauterine fetus was identified in the cephalic presentation, with normal fetal heart motion and normal fetal motion noted.  The placenta was on the posterior and right lateral aspects of the uterus.  The amniotic fluid index was 15.5 cm.  The estimated fetal weight was at the 65th growth percentile for gestational age.  Visualization of the fetal anatomy was somewhat limited secondary to poor acoustic transmission to the patient's anterior abdominal wall in the fetal position. The biophysical profile and cord Doppler studies were both reassuring. There was no evidence of absence, or reversal of end-diastolic flow.     The nonstress test today was reactive.  Occasional variable decelerations were noted.     A fetal ultrasound evaluation was performed on December 3, 2020.  A detailed report included under the media tab.  A living bae intrauterine fetus was identified in the cephalic presentation, with normal fetal heart motion and normal fetal motion noted.  The placenta was on the posterior and right lateral aspects of the uterus.  The amniotic fluid index was 18.7 cm.  The estimated fetal weight was at the 67th growth percentile for gestational age. Linda Ke biophysical profile and cord Doppler studies were both reassuring.  There was no evidence of absence, or reversal of end-diastolic flow.                          GENETIC SCREENING/TERATOLOGY COUNSELING                      (Includes patient, FTB, and any affected family members)     Patient Age > 35 Years NO   Thalassemia ( MVC<80) NO   Congential Heart Defect NO   Neural Tube Defect NO   Blu-Sachs NO   Sickle Cell Disease NO   Sickle Cell Trait NO   Sickle C Disease or Trait NO   Hemophilia NO   Muscular Dystrophy NO   Cystic Fibrosis NO   Dawes Disease NO   Autism NO   Mental Retardation NO   History of Fragile X NO   Maternal Diabetes NO   Other Genetic Disease or Syndrome NO Previous Child With Congenital Abnormality Not Listed NO   Recreational Drugs NO                                                INFECTION HISTORY          HEPATITIS IMMUNIZED:  YES   HEPATITIS INFECTION:  NO   EXPOSURE TO TB NO   GENITAL HERPES    NO   PARVOVIRUS B-19 NO   CHICKEN POX  YES   MEASLES NO   STD NO   HIV NO   OTHER RASH OR VIRAL ILLNESS SINCE LMP NO   UTI RECURRENT NO   HPV NO      OB History    Para Term  AB Living   6 2 2   3 1   SAB TAB Ectopic Molar Multiple Live Births     2 1       1       # Outcome Date GA Lbr Herson/2nd Weight Sex Delivery Anes PTL Lv   6 Current                     5 TAB 2019                   4  2019                   3  2019                   2 Term 17 37w2d   6 lb 9 oz (2.977 kg) F Vag-Spont EPI N DARIUS   1 Term 16 38w5d   6 lb 10 oz (3.005 kg) F Vag-Spont EPI N FD      PAST GYNECOLOGICAL  HISTORY:  Positive for abnormal pap smears. Doesn't know the grade  Negative for cervical LEEP / conization /cryosurgery.    Negative for uterine surgery.    Negative for ovarian or tubal surgery.      Past Medical History:   Diagnosis Date    Abnormal Pap smear of cervix      will repeat after delivery    Gestational diabetes mellitus (GDM) affecting pregnancy, antepartum 2020         Past Surgical History:   Procedure Laterality Date    DILATION AND CURETTAGE   2019     X 2    MASTOIDECTOMY        TYMPANOSTOMY TUBE PLACEMENT          No Known Allergies     Current Outpatient Medications:     vitamin B-6 (PYRIDOXINE) 50 MG tablet, Take 50 mg by mouth daily    aspirin 81 MG chewable tablet, Take 81 mg by mouth daily     diphenhydrAMINE HCl, Sleep, (UNISOM SLEEPMELTS PO), Take by mouth    Prenatal Vit-Fe Fumarate-FA (PRENATAL PO), Take by mouth     Social History      Tobacco Use    Smoking status: Former Smoker       Packs/day: 0.20       Types: Cigarettes       Last attempt to quit:        Years since quittin.4  Smokeless tobacco: Never Used   Substance Use Topics    Alcohol use: No      FAMILY MEDICAL HISTORY:   Negative for congenital abnormalities, autism, genetic disease and mental retardation, not listed above.      Review of Systems :   CONSTITUTIONAL : No fever, no chills   HEENT : No headache, no visual changes, no rhinorrhea, no sore throat   CARDIOVASCULAR : No pain, no palpitations, no edema   RESPIRATORY : No pain, no shortness of breath   GASTROINTESTINAL : No N/V, no D/C, no abdominal pain   GENITOURINARY : No dysuria, hematuria and no incontinence   MUSCULOSKELETAL : No myalgia, No back pain  NEUROLOGICAL : No numbness, no tingling, no tremors. No history of seizures  ALL OTHER SYSTEMS WERE REPORTED AS NEGATIVE.     PERTINENT PHYSICAL EXAMINATION:   /72   Pulse 107   Wt 213 lb (96.6 kg)   LMP 03/26/2020   BMI 36.56 kg/m²   Urine dipstick:   Negative Glucose  1+ protein     A fetal ultrasound assessment was performed today.  A report is enclosed for your review.     IMPRESSION:    1.  IUP at 36 weeks 0 days gestation based on her Estimated Date of Delivery: 12/31/20    2.  Gestational diabetes versus type 2 diabetes.    3.  History of a prior stillbirth at 37 weeks 5 days gestational age    2.  History of 2 spontaneous first trimester pregnancy losses    5.  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    6.  First trimester NT measurement was at the 65th growth percentile    7.  First trimester nasal bone visualization    8.  [place] screen showed no increased risk for fetal aneuploidy for the chromosomes assessed.    9.  Subchorionic hematoma, [id number]/8/2020    10.  Normal cervical length 11/12/2020  11.  Estimated fetal weight was at the 67th growth percentile 12/3/2020  12.  Reassuring biophysical profile and cord Doppler testing 12/3/2020  13.  Uterine contractions without cervical change 11/12/2020 14.  Reactive nonstress test with occasional variables 12/3/2020     PLAN:  I would recommend that the patient count fetal movements and call if she notices any subjective decrease in fetal movements, particularly if there are less than 10 major movements in an hour. Non-stress testing should be performed every 3 to 4 days through the balance of the pregnancy. Serial ultrasounds to assess fetal anatomy and growth should be performed. The patient is at increase risk for  morbidity and mortality secondary to her history. Weekly BPP and cord Doppler testing should be performed, unless there is a clinical indication to perform the testing more frequently.     I advised the patient to continue to do home glucose monitoring. She is to check her blood sugars fasting and 2 hours after each meal. Her fasting blood sugars should be less than 92 mg/dl and her 2 hour post prandial blood sugars less than 120 mg/dl. She is to call if her blood sugars are outside of these parameters.     The patient was advised to call if she has any increased vaginal discharge, vaginal bleeding, contractions, abdominal pain, back pain or any new significant symptomatology prior to her next visit. I advised her that these are signs and symptoms of cervical change and require follow-up assessment when they occur.     I requested the patient return for a follow-up assessment in 4 days unless there is a clinical reason for her to return prior to that time. She is to call if she has any problems or questions prior to her next visit.  Further evaluation and management will be dependent on her clinical presentation and the results of her testing.      The patient is to continue to follow with you in your office for ongoing obstetric care.     The total time in minutes spent with the patient, reviewing medical records, reviewing imaging studies, performing ultrasonic imaging, reviewing laboratory testing, and documenting information was 10 minutes, of which, 50% of the time was spent in patient education, counseling, and coordinating care with the patient and/or her family. Carlos Alberto Taveras discussed my recommendations for management of her blood sugars during pregnancy. We reviewed the results of her ultrasound assessment and fetal testing performed today.  I discussed my recommendations for ongoing evaluation and management of her pregnancy.  I answered all of her questions to her satisfaction. I asked her to call if she had any additional questions prior to her next visit.      As we previously discussed, because of the patient's prior history of an unexplained stillbirth at 37 weeks 5 days gestational age and her multiple comorbidities, I recommended that she be delivered at approximately 37 weeks gestational age with her current pregnancy.   I would recommend administering Celestone approximately 48 hours prior to delivery.     If you have any questions regarding her management, please contact me at your convenience and thank you for allowing me to participate in her care.     Sincerely,           Efrem Webb MD, Sheyla Elvin [address], Riky Veliz, [address] [facility],  Tori Garcia RVT  Director [address] [place] [address]  [phone]

## 2020-12-03 NOTE — PATIENT INSTRUCTIONS
Please arrive for your scheduled appointment at least 15 minutes early with your actual insurance card+ a photo ID. Also if you need any refills ordered or have questions, it may take up 48 hours to reply. Please allow ample time for your refills. Call me when you use last refill. Thank you for your cooperation. Any questions contact [place] at [phone]. If you are experiencing an emergency and need immediate help, call 911 or go to go emergency room or labor and delivery. if you are sick, not feeling well or have an infectious process going on please reschedule your appointment by calling [phone]. Also if any family members are not feeling well, please do not bring them to your appointment. We appreciate your cooperation. We are doing this in order to protect our pregnant mothers+ their babies. Call your primary obstetrician with bleeding, leaking of fluid, abdominal tenderness, headache, blurry vision, epigastric pain and increased urinary frequency. Do kick counts after dinner. Call your primary obstetrician if less than 10 kicks in 2 hours after dinner. Call your primary obstetrician with bleeding, leaking of fluid, abdominal tenderness, headache, blurry vision, epigastric pain and increased urinary frequency. You might be having an NST at your next appt. Please eat a large snack or breakfast before coming to office. Thank you  Patient Education        Weeks 34 to 39 of Your Pregnancy: Care Instructions  Your Care Instructions     By now, your baby and your belly have grown quite large. It is almost time to give birth. Your baby's lungs are almost ready to breathe air. The bones in your baby's head are now firm enough to protect it, but soft enough to move down through the birth canal.  You may feel excited, happy, anxious, or scared. You may wonder how you will know if you are in labor or what to expect during labor. Try to be flexible in your expectations of the birth.  Because each birth is different, [employer] [facility] disclaims any warranty or liability for your use of this information. Patient Education        Learning About When to Call Your Doctor During Pregnancy (After 20 Weeks)  Your Care Instructions  It's common to have concerns about what might be a problem during pregnancy. Although most pregnant women don't have any serious problems, it's important to know when to call your doctor if you have certain symptoms or signs of labor. These are general suggestions. Your doctor may give you some more information about when to call. When to call your doctor (after 20 weeks)  Call 911 anytime you think you may need emergency care. For example, call if:  · You have severe vaginal bleeding. · You have sudden, severe pain in your belly. · You passed out (lost consciousness). · You have a seizure. · You see or feel the umbilical cord. · You think you are about to deliver your baby and can't make it safely to the hospital.  Call your doctor now or seek immediate medical care if:  · You have vaginal bleeding. · You have belly pain. · You have a fever. · You have symptoms of preeclampsia, such as:  ? Sudden swelling of your face, hands, or feet. ? New vision problems (such as dimness, blurring, or seeing spots). ? A severe headache. · You have a sudden release of fluid from your vagina. (You think your water broke.)  · You think that you may be in labor. This means that you've had at least 6 contractions in an hour. · You notice that your baby has stopped moving or is moving much less than normal.  · You have symptoms of a urinary tract infection. These may include:  ? Pain or burning when you urinate. ? A frequent need to urinate without being able to pass much urine. ? Pain in the flank, which is just below the rib cage and above the waist on either side of the back. ? Blood in your urine.   Watch closely for changes in your health, and be sure to contact your doctor if:  · You have prenatal visits, your doctor will ask whether the baby is active. In your last trimester, your doctor may ask you to count the number of times you feel your baby move. Follow-up care is a key part of your treatment and safety. Be sure to make and go to all appointments, and call your doctor if you are having problems. It's also a good idea to know your test results and keep a list of the medicines you take. How do you count fetal kicks? · A common method of checking your baby's movement is to count the number of kicks or moves you feel in 1 hour. Ten movements (such as kicks, flutters, or rolls) in 1 hour are normal. Some doctors suggest that you count in the morning until you get to 10 movements. Then you can quit for that day and start again the next day. · Pick your baby's most active time of day to count. This may be any time from morning to evening. · If you do not feel 10 movements in an hour, your baby may be sleeping. Wait for the next hour and count again. When should you call for help? Call your doctor now or seek immediate medical care if:    · You noticed that your baby has stopped moving or is moving much less than normal.   Watch closely for changes in your health, and be sure to contact your doctor if you have any problems. Where can you learn more? Go to https://[employer]GardenStory. org and sign in to your Ernie's account. Enter C736 in the [employer] box to learn more about \"Counting Your Baby's Kicks: Care Instructions. \"     If you do not have an account, please click on the \"Sign Up Now\" link. Current as of: February 11, 2020               Content Version: 12.6  © 0201-6715 [employer]. Care instructions adapted under license by [facility] ([facility]). If you have questions about a medical condition or this instruction, always ask your healthcare professional. [address] any warranty or liability for your use of this information. Patient Education        Diabetes Blood Sugar Emergencies: Your Action Plan  How can you prevent a blood sugar emergency? An important part of living with diabetes is keeping your blood sugar in your target range. You'll need to know what to do if it's too high or too low. Managing your blood sugar levels helps you avoid emergencies. This care sheet will teach you about the signs of high and low blood sugar. It will help you make an action plan with your doctor for when these signs occur. Low blood sugar is more likely to happen if you take certain medicines for diabetes. It can also happen if you skip a meal, drink alcohol, or exercise more than usual.  You may get high blood sugar if you eat differently than you normally do. One example is eating more carbohydrate than usual. Having a cold, the flu, or other sudden illness can also cause high blood sugar levels. Levels can also rise if you miss a dose of medicine. Any change in how you take your medicine may affect your blood sugar level. So it's important to work with your doctor before you make any changes. Check your blood sugar  Work with your doctor to fill in the blank spaces below that apply to you. Track your levels, know your target range, and write down ways you can get your blood sugar back in your target range. A log book can help you track your levels. Take the book to all of your medical appointments. · Check your blood sugar _____ times a day, at these times:________________________________________________. (For example: Before meals, at bedtime, before exercise, during exercise, other.)  · Your blood sugar target range before a meal is ___________________. Your blood sugar target range after a meal is _______________________. · Do this--___________________________________________________--to get your blood sugar back within your safe range if your blood sugar results are _________________________________________.  (For example: Less than 70 or above 250 mg/dL.)  Call your doctor when your blood sugar results are ___________________________________. (For example: Less than 70 or above 250 mg/dL.)  What are the symptoms of low and high blood sugar? Common symptoms of low blood sugar are sweating and feeling shaky, weak, hungry, or confused. Symptoms can start quickly. Common symptoms of high blood sugar are feeling very thirsty or very hungry. You may also pass urine more often than usual. You may have blurry vision and may lose weight without trying. But some people may have high or low blood sugar without having any symptoms. That's a good reason to check your blood sugar on a regular schedule. What should you do if you have symptoms? Work with your doctor to fill in the blank spaces below that apply to you. Low blood sugar  If you have symptoms of low blood sugar, check your blood sugar. If it's below _____ ( for example, below 70), eat or drink a quick-sugar food that has about 15 grams of carbohydrate. Your goal is to get your level back to your safe range. Check your blood sugar again 15 minutes later. If it's still not in your target range, take another 15 grams of carbohydrate and check your blood sugar again in 15 minutes. Repeat this until you reach your target. Then go back to your regular testing schedule. Children usually need less than 15 grams of carbohydrate. Check with your doctor or diabetes educator for the amount that is right for your child. When you have low blood sugar, it's best to stop or reduce any physical activity until your blood sugar is back in your target range and is stable. If you must stay active, eat or drink 30 grams of carbohydrate. Then check your blood sugar again in 15 minutes. If it's not in your target range, take another 30 grams of carbohydrates. Check your blood sugar again in 15 minutes. Keep doing this until you reach your target. You can then go back to your regular testing schedule.   If your symptoms or blood sugar levels are getting worse or have not improved after 15 minutes, seek medical care right away. Here are some examples of quick-sugar foods with 15 grams of carbohydrate:  · 3 or 4 glucose tablets  · 1 tablespoon (3 teaspoons) table sugar  · ½ cup to ¾ cup (4 to 6 ounces) of fruit juice or regular (not diet) soda  · Hard candy (such as 6 Life Savers)  High blood sugar  If you have symptoms of high blood sugar, check your blood sugar. Your goal is to get your level back to your target range. If it's above ______ ( for example, above 250), follow these steps:  · If you missed a dose of your diabetes medicine, take it now. Take only the amount of medicine that you have been prescribed. Do not take more or less medicine. · Give yourself insulin if your doctor has prescribed it for high blood sugar. · Test for ketones, if the doctor told you to do so. If the results of the ketone test show a moderate-to-large amount of ketones, call the doctor for advice. · Wait 30 minutes after you take the extra insulin or the missed medicine. Check your blood sugar again. If your symptoms or blood sugar levels are getting worse or have not improved after taking these steps, seek medical care right away. Follow-up care is a key part of your treatment and safety. Be sure to make and go to all appointments, and call your doctor if you are having problems. It's also a good idea to know your test results and keep a list of the medicines you take. Where can you learn more? Go to https://[email or web address] kay.fashionandyou.com. org and sign in to your [email or web address] account. Enter S575 in the [facility] box to learn more about \"Diabetes Blood Sugar Emergencies: Your Action Plan. \"     If you do not have an account, please click on the \"Sign Up Now\" link. Current as of: December 20, 2019               Content Version: 12.6  © 9869-0062 [employer].    Care instructions adapted under license by [facility]. If you have questions about a medical condition or this instruction, always ask your healthcare professional. [place] [address] any warranty or liability for your use of this information.

## 2020-12-04 LAB
C TRACH DNA GENITAL QL NAA+PROBE: NEGATIVE
GROUP B STREP CULTURE: NORMAL
N. GONORRHOEAE DNA: NEGATIVE
SOURCE: NORMAL

## 2020-12-08 ENCOUNTER — ROUTINE PRENATAL (OUTPATIENT)
Dept: OBGYN | Age: 30
End: 2020-12-08
Payer: COMMERCIAL

## 2020-12-08 VITALS
BODY MASS INDEX: 36.73 KG/M2 | TEMPERATURE: 97.1 F | DIASTOLIC BLOOD PRESSURE: 74 MMHG | HEART RATE: 108 BPM | WEIGHT: 214 LBS | SYSTOLIC BLOOD PRESSURE: 139 MMHG

## 2020-12-08 PROCEDURE — 99213 OFFICE O/P EST LOW 20 MIN: CPT | Performed by: OBSTETRICS & GYNECOLOGY

## 2020-12-08 PROCEDURE — 59025 FETAL NON-STRESS TEST: CPT | Performed by: OBSTETRICS & GYNECOLOGY

## 2020-12-08 PROCEDURE — 0502F SUBSEQUENT PRENATAL CARE: CPT | Performed by: OBSTETRICS & GYNECOLOGY

## 2020-12-08 NOTE — PROGRESS NOTES
Here for routine prenatal and NST today. Doing well, occasional B-H contractions. No LOF, good movement. Scheduled for Induction of labor the  I believe. Scheduled for NST Thursday at [facility]. GBS was Negative. NON STRESS TEST INTERPRETATION    20    RE:  Gagandeep Virgiekeyanna   : 1990   AGE: 26 y.o.     GESTATIONAL AGE:  36w5d      DIAGNOSIS:   Thrombophelia     Gest Diabetes    INDICATION:  Thrombophelia    TIME ON:  825      TIME OFF:  846      RESULT:   REACTIVE      FHR Baseline Rate:   140 bpm    PERIODIC CHANGES:    · Accelerations present, variability moderate, no decelerations noted    COMMENTS:      She is to continue having NST's every 3-4 days, and BPP with umbilical artery doppler studies once per week      Natalie Cano MD

## 2020-12-08 NOTE — PROGRESS NOTES
NST completed. FHR baseline 135-140 with moderate variability + accelerations. Occasional contractions noted. NST reactive per Dr. Tracee Muir. See his note. Discharge instructions have been discussed with the patient and she voiced understanding of care plan.

## 2020-12-08 NOTE — PROGRESS NOTES
Pt here today for routine prenatal visit at 36w5d and NST. She states feeling occasional artemio browning ctxs but denies any lof or vb. Perceives good fetal movement.

## 2020-12-10 ENCOUNTER — ROUTINE PRENATAL (OUTPATIENT)
Dept: OBGYN CLINIC | Age: 30
End: 2020-12-10
Payer: COMMERCIAL

## 2020-12-10 VITALS
HEIGHT: 64 IN | DIASTOLIC BLOOD PRESSURE: 70 MMHG | TEMPERATURE: 97.4 F | HEART RATE: 118 BPM | WEIGHT: 214 LBS | SYSTOLIC BLOOD PRESSURE: 116 MMHG | BODY MASS INDEX: 36.54 KG/M2

## 2020-12-10 LAB
GLUCOSE URINE, POC: NORMAL
PROTEIN UA: ABNORMAL

## 2020-12-10 PROCEDURE — 99213 OFFICE O/P EST LOW 20 MIN: CPT | Performed by: OBSTETRICS & GYNECOLOGY

## 2020-12-10 PROCEDURE — 99212 OFFICE O/P EST SF 10 MIN: CPT | Performed by: OBSTETRICS & GYNECOLOGY

## 2020-12-10 PROCEDURE — 81002 URINALYSIS NONAUTO W/O SCOPE: CPT | Performed by: OBSTETRICS & GYNECOLOGY

## 2020-12-10 PROCEDURE — 76820 UMBILICAL ARTERY ECHO: CPT | Performed by: OBSTETRICS & GYNECOLOGY

## 2020-12-10 PROCEDURE — 76818 FETAL BIOPHYS PROFILE W/NST: CPT | Performed by: OBSTETRICS & GYNECOLOGY

## 2020-12-10 NOTE — PATIENT INSTRUCTIONS
Please arrive for your scheduled appointment at least 15 minutes early with your actual insurance card+ a photo ID. Also if you need any refills ordered or have questions, it may take up 48 hours to reply. Please allow ample time for your refills. Call me when you use last refill. Thank you for your cooperation. Any questions contact Julisa at [phone]. If you are experiencing an emergency and need immediate help, call 911 or go to go emergency room or labor and delivery. if you are sick, not feeling well or have an infectious process going on please reschedule your appointment by calling [phone]. Also if any family members are not feeling well, please do not bring them to your appointment. We appreciate your cooperation. We are doing this in order to protect our pregnant mothers+ their babies. Call your primary obstetrician with bleeding, leaking of fluid, abdominal tenderness, headache, blurry vision, epigastric pain and increased urinary frequency. Do kick counts after dinner. Call your primary obstetrician if less than 10 kicks in 2 hours after dinner. Call your primary obstetrician with bleeding, leaking of fluid, abdominal tenderness, headache, blurry vision, epigastric pain and increased urinary frequency. You might be having an NST at your next appt. Please eat a large snack or breakfast before coming to office. Thank you  Patient Education        Week 40 of Your Pregnancy: Care Instructions  Your Care Instructions     You are near the end of your pregnancy--and you're probably pretty uncomfortable. It may be harder to walk around. Lying down probably isn't comfortable either. You may have trouble getting to sleep or staying asleep. Most women deliver their babies between 40 and 41 weeks. This is a good time to think about packing a bag for the hospital with items you'll need. Then you'll be ready when labor starts. Follow-up care is a key part of your treatment and safety.  Be sure to make and go stool is called meconium. After the meconium is passed, your baby will develop his or her own bowel pattern. ? Some babies, especially  babies, have several bowel movements a day. Others have one or two a day, or one every 2 to 3 days. ?  babies often have loose, yellow stools. Formula-fed babies have more formed stools. ? If your baby's stools look like little pellets, he or she is constipated. After 2 days of constipation, call your baby's doctor. · If your baby will be circumcised, you can care for him at home. ? Gently rinse his penis with warm water after every diaper change. Do not try to remove the film that forms on the penis. This film will go away on its own. Pat dry. ? Put petroleum ointment, such as Vaseline, on the area of the diaper that will touch your baby's penis. This will keep the diaper from sticking to your baby. ? Ask the doctor about giving your baby acetaminophen (Tylenol) for pain. Where can you learn more? Go to https://PartSimple.[employer]. org and sign in to your [employer] account. Enter 68 21 [id number] in the Mob[employer] box to learn more about \"Week 37 of Your Pregnancy: Care Instructions. \"     If you do not have an account, please click on the \"Sign Up Now\" link. Current as of: February 11, 2020               Content Version: 12.6  © 5891-2128 [employer]. Care instructions adapted under license by [facility] ([facility]). If you have questions about a medical condition or this instruction, always ask your healthcare professional. [place] [address] any warranty or liability for your use of this information. Patient Education        Learning About When to Call Your Doctor During Pregnancy (After 20 Weeks)  Your Care Instructions  It's common to have concerns about what might be a problem during pregnancy.  Although most pregnant women don't have any serious problems, it's important to know when to call your doctor if you have certain symptoms or signs of labor. These are general suggestions. Your doctor may give you some more information about when to call. When to call your doctor (after 20 weeks)  Call 911 anytime you think you may need emergency care. For example, call if:  · You have severe vaginal bleeding. · You have sudden, severe pain in your belly. · You passed out (lost consciousness). · You have a seizure. · You see or feel the umbilical cord. · You think you are about to deliver your baby and can't make it safely to the hospital.  Call your doctor now or seek immediate medical care if:  · You have vaginal bleeding. · You have belly pain. · You have a fever. · You have symptoms of preeclampsia, such as:  ? Sudden swelling of your face, hands, or feet. ? New vision problems (such as dimness, blurring, or seeing spots). ? A severe headache. · You have a sudden release of fluid from your vagina. (You think your water broke.)  · You think that you may be in labor. This means that you've had at least 6 contractions in an hour. · You notice that your baby has stopped moving or is moving much less than normal.  · You have symptoms of a urinary tract infection. These may include:  ? Pain or burning when you urinate. ? A frequent need to urinate without being able to pass much urine. ? Pain in the flank, which is just below the rib cage and above the waist on either side of the back. ? Blood in your urine. Watch closely for changes in your health, and be sure to contact your doctor if:  · You have vaginal discharge that smells bad. · You have skin changes, such as:  ? A rash. ? Itching. ? Yellow color to your skin. · You have other concerns about your pregnancy. If you have labor signs at 37 weeks or more  If you have signs of labor at 37 weeks or more, your doctor may tell you to call when your labor becomes more active. Symptoms of active labor include:  · Contractions that are regular.   · Contractions will help you make an action plan with your doctor for when these signs occur. Low blood sugar is more likely to happen if you take certain medicines for diabetes. It can also happen if you skip a meal, drink alcohol, or exercise more than usual.  You may get high blood sugar if you eat differently than you normally do. One example is eating more carbohydrate than usual. Having a cold, the flu, or other sudden illness can also cause high blood sugar levels. Levels can also rise if you miss a dose of medicine. Any change in how you take your medicine may affect your blood sugar level. So it's important to work with your doctor before you make any changes. Check your blood sugar  Work with your doctor to fill in the blank spaces below that apply to you. Track your levels, know your target range, and write down ways you can get your blood sugar back in your target range. A log book can help you track your levels. Take the book to all of your medical appointments. · Check your blood sugar _____ times a day, at these times:________________________________________________. (For example: Before meals, at bedtime, before exercise, during exercise, other.)  · Your blood sugar target range before a meal is ___________________. Your blood sugar target range after a meal is _______________________. · Do this--___________________________________________________--to get your blood sugar back within your safe range if your blood sugar results are _________________________________________. (For example: Less than 70 or above 250 mg/dL.)  Call your doctor when your blood sugar results are ___________________________________. (For example: Less than 70 or above 250 mg/dL.)  What are the symptoms of low and high blood sugar? Common symptoms of low blood sugar are sweating and feeling shaky, weak, hungry, or confused. Symptoms can start quickly. Common symptoms of high blood sugar are feeling very thirsty or very hungry.  You high blood sugar, check your blood sugar. Your goal is to get your level back to your target range. If it's above ______ ( for example, above 250), follow these steps:  · If you missed a dose of your diabetes medicine, take it now. Take only the amount of medicine that you have been prescribed. Do not take more or less medicine. · Give yourself insulin if your doctor has prescribed it for high blood sugar. · Test for ketones, if the doctor told you to do so. If the results of the ketone test show a moderate-to-large amount of ketones, call the doctor for advice. · Wait 30 minutes after you take the extra insulin or the missed medicine. Check your blood sugar again. If your symptoms or blood sugar levels are getting worse or have not improved after taking these steps, seek medical care right away. Follow-up care is a key part of your treatment and safety. Be sure to make and go to all appointments, and call your doctor if you are having problems. It's also a good idea to know your test results and keep a list of the medicines you take. Where can you learn more? Go to https://MobileApps.compepiceweb.Webflow. org and sign in to your [employer]2G account. Enter Y195 in the [facility] box to learn more about \"Diabetes Blood Sugar Emergencies: Your Action Plan. \"     If you do not have an account, please click on the \"Sign Up Now\" link. Current as of: December 20, 2019               Content Version: 12.6  © 8242-3674 [employer]. Care instructions adapted under license by [facility] ([facility]). If you have questions about a medical condition or this instruction, always ask your healthcare professional. [place] [address] any warranty or liability for your use of this information.

## 2020-12-10 NOTE — LETTER
12/10/20    Ruiz Monroy, [address] [facility]  [place], [address]                RE: Lazara Padgett  : 1990   AGE: 26 y.o.     This report has been created using voice recognition software. It may contain errors which are inherent in voice recognition technology.     Dear Dr. 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 o. female, who is G6(2,0,3,1). She has an Estimated Date of Delivery: 20 based on her LMP and previous ultrasound assessment. She is currently 37 weeks 0 days gestation based on that assessment.      The patient has had no major problems since her last visit. She states that her fetal movement has been good. She has had no increased vaginal discharge, vaginal bleeding, back pain, dysuria, hematuria, or leaking of amniotic fluid.     I reviewed the patient's home blood sugar monitoring results. Her fasting blood sugars have been intermittently elevated.  Her postprandial blood sugars are generally less than 120 unless she deviates from her carbohydrate controlled diet.      The patient had 2 prior spontaneous first trimester pregnancy losses.  One of the pregnancies had no yolk sac or embryo identified.  She had a positive pregnancy test which subsequently became negative.     The patient terminated a pregnancy in the first trimester as a result of suspected trisomy 13 based on a chorionic villous sampling.  When the full karyotype analysis was completed it was found that this fetus actually had a normal karyotype.    The patient had a stillbirth with her first pregnancy at 45 weeks 5 days gestational age. She stated that no specific cause was determined. She had no history of medical or surgical disease. She had no history of trauma. She stated that she had decreased fetal movement on the Sunday prior to the Monday on which the baby was confirmed to be demised. No autopsy was performed. The patient stated that the baby's karyotype was normal, however the fetal skin biopsy sample was reported as not able to have a karyotype completed.      The results of [place] screen were negative. I advised her that this a screening test not a diagnostic test. I advised her that the test screens only for trisomy 21, 18 and 13. We discussed the sensitivity of the test which I advised the patient is as follows:       99.99% for detection of trisomy 21 with a specificity of 99.9%     99.99% for trisomy 25 with a specificity of 99.6%     99.99% for trisomy 15 with a specificity of 99.7%      She understands that the [facility] testing does not replace diagnostic genetic testing. She understands the limitations of this testing, including, but not limited to, not detecting partial fetal karyotype abnormalities, and in some cases, limited information regarding unbalanced translocations.  The test is also limited in that the results may not reflect chromosomes of the fetus due to confined placental mosaicism or chromosomal changes in the mother.  The test is validated for single and twin pregnancies with a gestational age of at least 9 weeks.  Chromosomal mosaicism cannot be distinguished, and in some cases, not detected by this method.  A negative test does not eliminate the possibility of fetal chromosome abnormalities in regions tested or and other areas of the genome.  The tests cannot rule out other genetic diseases or birth defects, including open neural tube defects.     A fetal ultrasound evaluation was performed on November 5, 2020.  A detailed report included under the media tab.  A living bae intrauterine fetus was identified in the cephalic presentation, with normal fetal heart motion and normal fetal motion noted.  The placenta was on the posterior and right lateral aspects of the uterus.  The amniotic fluid index was 15.5 cm.  The estimated fetal weight was at the 65th growth percentile for gestational age.  Visualization of the fetal anatomy was somewhat limited secondary to poor acoustic transmission to the patient's anterior abdominal wall in the fetal position. The biophysical profile and cord Doppler studies were both reassuring. There was no evidence of absence, or reversal of end-diastolic flow.     The nonstress test today was reactive.  Occasional variable decelerations were noted.     A fetal ultrasound evaluation was performed on December 10, 2020.  A detailed report is included in the EMR included under the media tab.  A living bae intrauterine fetus was identified in the cephalic presentation, with normal fetal heart motion and normal fetal motion noted.  The placenta was on the posterior and right lateral aspects of the uterus.  The amniotic fluid index was 20.8 cm.  The biophysical profile and cord Doppler studies were both reassuring.  There was no evidence of absence, or reversal of end-diastolic flow.                          GENETIC SCREENING/TERATOLOGY COUNSELING                      (Includes patient, FTB, and any affected family members)     Patient Age > 35 Years NO   Thalassemia ( MVC<80) NO   Congential Heart Defect NO   Neural Tube Defect NO   Blu-Sachs NO   Sickle Cell Disease NO   Sickle Cell Trait NO   Sickle C Disease or Trait NO   Hemophilia NO   Muscular Dystrophy NO   Cystic Fibrosis NO   Beauregard Disease NO   Autism NO   Mental Retardation NO   History of Fragile X NO   Maternal Diabetes NO   Other Genetic Disease or Syndrome NO   Previous Child With Congenital Abnormality Not Listed NO Recreational Drugs NO                                                INFECTION HISTORY          HEPATITIS IMMUNIZED:  YES   HEPATITIS INFECTION:  NO   EXPOSURE TO TB NO   GENITAL HERPES    NO   PARVOVIRUS B-19 NO   CHICKEN POX  YES   MEASLES NO   STD NO   HIV NO   OTHER RASH OR VIRAL ILLNESS SINCE LMP NO   UTI RECURRENT NO   HPV NO      OB History    Para Term  AB Living   6 2 2   3 1   SAB TAB Ectopic Molar Multiple Live Births     2 1       1       # Outcome Date GA Lbr Herson/2nd Weight Sex Delivery Anes PTL Lv   6 Current                     5 TAB 2019                   4 2019                   3 2019                   2 Term 17 37w2d   6 lb 9 oz (2.977 kg) F Vag-Spont EPI N DARIUS   1 Term 16 38w5d   6 lb 10 oz (3.005 kg) F Vag-Spont EPI N FD      PAST GYNECOLOGICAL  HISTORY:  Positive for abnormal pap smears. Doesn't know the grade  Negative for cervical LEEP / conization /cryosurgery.    Negative for uterine surgery. Negative for ovarian or tubal surgery.      Past Medical History:   Diagnosis Date    Abnormal Pap smear of cervix      will repeat after delivery    Gestational diabetes mellitus (GDM) affecting pregnancy, antepartum 2020         Past Surgical History:   Procedure Laterality Date    DILATION AND CURETTAGE        X 2    MASTOIDECTOMY        TYMPANOSTOMY TUBE PLACEMENT          No Known Allergies     Current Outpatient Medications:     vitamin B-6 (PYRIDOXINE) 50 MG tablet, Take 50 mg by mouth daily    aspirin 81 MG chewable tablet, Take 81 mg by mouth daily     diphenhydrAMINE HCl, Sleep, (UNISOM SLEEPMELTS PO), Take by mouth    Prenatal Vit-Fe Fumarate-FA (PRENATAL PO), Take by mouth     Social History      Tobacco Use    Smoking status: Former Smoker       Packs/day: 0.20       Types: Cigarettes       Last attempt to quit: 2014       Years since quittin.4    Smokeless tobacco: Never Used   Substance Use Topics    Alcohol use:  No     FAMILY MEDICAL HISTORY:   Negative for congenital abnormalities, autism, genetic disease and mental retardation, not listed above.      Review of Systems :   CONSTITUTIONAL : No fever, no chills   HEENT : No headache, no visual changes, no rhinorrhea, no sore throat   CARDIOVASCULAR : No pain, no palpitations, no edema   RESPIRATORY : No pain, no shortness of breath   GASTROINTESTINAL : No N/V, no D/C, no abdominal pain   GENITOURINARY : No dysuria, hematuria and no incontinence   MUSCULOSKELETAL : No myalgia, No back pain  NEUROLOGICAL : No numbness, no tingling, no tremors. No history of seizures  ALL OTHER SYSTEMS WERE REPORTED AS NEGATIVE.     PERTINENT PHYSICAL EXAMINATION:   /70   Pulse 118   Temp 97.4 °F (36.3 °C)   Ht 5' 4\" (1.626 m)   Wt 214 lb (97.1 kg)   LMP 03/26/2020   BMI 36.73 kg/m²   Urine dipstick:   Negative Glucose  2+ protein     A fetal ultrasound assessment was performed today.  A report is enclosed for your review.     IMPRESSION:    1.  IUP at 37 weeks 0 days gestation based on her Estimated Date of Delivery: 12/31/20    2.  Gestational diabetes versus type 2 diabetes.    3.  History of a prior stillbirth at 37 weeks 5 days gestational age    2.  History of 2 spontaneous first trimester pregnancy losses    5.  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    6.  First trimester NT measurement was at the 65th growth percentile    7.  First trimester nasal bone visualization    8.  [place] screen showed no increased risk for fetal aneuploidy for the chromosomes assessed.    9.  Subchorionic hematoma, gbisugyr79/8/2020    10.  Normal cervical length 11/12/2020  11.  Estimated fetal weight was at the 67th growth percentile 12/3/2020  12.  Reassuring biophysical profile and cord Doppler testing 12/10/2020  13.  Uterine contractions without cervical change 11/12/2020   14.  Reactive nonstress test with occasional variables 12/10/2020    PLAN:  I would recommend that the patient count fetal movements and call if she notices any subjective decrease in fetal movements, particularly if there are less than 10 major movements in an hour. Non-stress testing should be performed every 3 to 4 days through the balance of the pregnancy. Serial ultrasounds to assess fetal anatomy and growth should be performed. The patient is at increase risk for  morbidity and mortality secondary to her history. Weekly BPP and cord Doppler testing should be performed, unless there is a clinical indication to perform the testing more frequently.     I advised the patient to continue to do home glucose monitoring. She is to check her blood sugars fasting and 2 hours after each meal. Her fasting blood sugars should be less than 92 mg/dl and her 2 hour post prandial blood sugars less than 120 mg/dl. She is to call if her blood sugars are outside of these parameters.     The patient was advised to call if she has any increased vaginal discharge, vaginal bleeding, contractions, abdominal pain, back pain or any new significant symptomatology prior to her next visit. I advised her that these are signs and symptoms of cervical change and require follow-up assessment when they occur.     I requested the patient return for a follow-up assessment in 4 days unless there is a clinical reason for her to return prior to that time. She is to call if she has any problems or questions prior to her next visit.  Further evaluation and management will be dependent on her clinical presentation and the results of her testing.      The patient is to continue to follow with you in your office for ongoing obstetric care.     The total time in minutes spent with the patient, reviewing medical records, reviewing imaging studies, performing ultrasonic imaging, reviewing laboratory testing, and documenting information was 10 minutes, of which, 50% of the time was spent in patient education, counseling, and coordinating care with the patient and/or her family. Mala Irene discussed my recommendations for management of her blood sugars during pregnancy. We reviewed the results of her ultrasound assessment and fetal testing performed today.  I discussed my recommendations for ongoing evaluation and management of her pregnancy.  I answered all of her questions to her satisfaction. I asked her to call if she had any additional questions prior to her next visit.       As we previously discussed, because of the patient's prior history of an unexplained stillbirth at 37 weeks 5 days gestational age and her multiple comorbidities, I recommended that she be delivered at approximately 37 weeks gestational age with her current pregnancy.   I would recommend administering Celestone approximately 48 hours prior to delivery.     If you have any questions regarding her management, please contact me at your convenience and thank you for allowing me to participate in her care.     Sincerely,           Marline Moran MD, [address], Iris Tucker, [address] [facility],  Tori Garcia BREANNA  Director [address] [place] [address]  [phone]

## 2020-12-10 NOTE — PROGRESS NOTES
Pt denies bleeding,lof,ctxPt states blood sugars wnl. Blood sugar record scanned to pt chart. Pt states good fetal movement. Kick counts encouraged. All questions answered+ information confirmed by pt. Nst completed. Baseline 140  with moderate variabilty+ accelelrations.  Discussed u/s+ NST with dr Edvin Boykin Reactive per dr Edvin Boykin

## 2020-12-15 ENCOUNTER — HOSPITAL ENCOUNTER (OUTPATIENT)
Age: 30
Discharge: HOME | End: 2020-12-15
Attending: OBSTETRICS & GYNECOLOGY | Admitting: OBSTETRICS & GYNECOLOGY
Payer: COMMERCIAL

## 2020-12-15 ENCOUNTER — ROUTINE PRENATAL (OUTPATIENT)
Dept: OBGYN | Age: 30
End: 2020-12-15
Payer: COMMERCIAL

## 2020-12-15 VITALS
TEMPERATURE: 97.6 F | HEART RATE: 97 BPM | SYSTOLIC BLOOD PRESSURE: 119 MMHG | RESPIRATION RATE: 16 BRPM | DIASTOLIC BLOOD PRESSURE: 56 MMHG

## 2020-12-15 VITALS
SYSTOLIC BLOOD PRESSURE: 126 MMHG | DIASTOLIC BLOOD PRESSURE: 82 MMHG | BODY MASS INDEX: 36.56 KG/M2 | HEART RATE: 106 BPM | WEIGHT: 213 LBS

## 2020-12-15 PROBLEM — Z3A.37 37 WEEKS GESTATION OF PREGNANCY: Status: ACTIVE | Noted: 2020-12-15

## 2020-12-15 PROBLEM — O24.410 DIET CONTROLLED GESTATIONAL DIABETES MELLITUS (GDM) IN THIRD TRIMESTER: Status: ACTIVE | Noted: 2020-06-18

## 2020-12-15 LAB
GLUCOSE URINE, POC: NEGATIVE
PROTEIN UA: ABNORMAL

## 2020-12-15 PROCEDURE — 96372 THER/PROPH/DIAG INJ SC/IM: CPT

## 2020-12-15 PROCEDURE — 6360000002 HC RX W HCPCS: Performed by: OBSTETRICS & GYNECOLOGY

## 2020-12-15 PROCEDURE — 59025 FETAL NON-STRESS TEST: CPT | Performed by: OBSTETRICS & GYNECOLOGY

## 2020-12-15 PROCEDURE — 99213 OFFICE O/P EST LOW 20 MIN: CPT | Performed by: OBSTETRICS & GYNECOLOGY

## 2020-12-15 PROCEDURE — 59025 FETAL NON-STRESS TEST: CPT

## 2020-12-15 PROCEDURE — 99211 OFF/OP EST MAY X REQ PHY/QHP: CPT

## 2020-12-15 PROCEDURE — 59025 FETAL NON-STRESS TEST: CPT | Performed by: NURSE PRACTITIONER

## 2020-12-15 PROCEDURE — 59426 ANTEPARTUM CARE ONLY: CPT | Performed by: OBSTETRICS & GYNECOLOGY

## 2020-12-15 RX ORDER — BETAMETHASONE SODIUM PHOSPHATE AND BETAMETHASONE ACETATE 3; 3 MG/ML; MG/ML
12 INJECTION, SUSPENSION INTRA-ARTICULAR; INTRALESIONAL; INTRAMUSCULAR; SOFT TISSUE ONCE
Status: COMPLETED | OUTPATIENT
Start: 2020-12-15 | End: 2020-12-15

## 2020-12-15 RX ORDER — BETAMETHASONE SODIUM PHOSPHATE AND BETAMETHASONE ACETATE 3; 3 MG/ML; MG/ML
12 INJECTION, SUSPENSION INTRA-ARTICULAR; INTRALESIONAL; INTRAMUSCULAR; SOFT TISSUE ONCE
Status: DISCONTINUED | OUTPATIENT
Start: 2020-12-16 | End: 2020-12-15 | Stop reason: HOSPADM

## 2020-12-15 RX ADMIN — BETAMETHASONE SODIUM PHOSPHATE AND BETAMETHASONE ACETATE 12 MG: 3; 3 INJECTION, SUSPENSION INTRA-ARTICULAR; INTRALESIONAL; INTRAMUSCULAR at 14:24

## 2020-12-15 NOTE — PROGRESS NOTES
Discharge instructions provided and explained to patient. Also explained that she should return in 24 hours for her 2nd celestone injection. Patient verbalized understanding with no further questions. Patient left the unit ambulatory with no concerns.

## 2020-12-15 NOTE — PROGRESS NOTES
Oswaldo Aguilera    Patient presents for routine prenatal visit today at 37w5d. Patient is scheduled for induction of labor at 38 weeks per [facility]. Patient has a history of IUFD at 38w5d in prior pregnancy. Her induction is scheduled for this Thursday, December 17th. [facility] recommends celestone 48 hours prior to delivery. Will send today for first injection. Patient with GDM. Sugars controlled. Denies complaints  Denies VB, or cramping  Feeling movement at this time. Reviewed labor precautions and kick counts. Fetal Hr: 150  Cervical exam 1/ thick    GBS negative  Gonorrhea/ chlamydia negative     All questions and concerns addressed at this time      ICD-10-CM    1. Prenatal care in third trimester  Z34.93 Fetal nonstress test   2. Thrombophilia affecting pregnancy in third trimester, antepartum ([facility]  O99.113     D68.59    3. Gestational diabetes mellitus (GDM) affecting pregnancy, antepartum  O24.419    4. Recurrent pregnancy loss in pregnant patient in third trimester, antepartum  O26.23    5. History of stillbirth in pregnant patient in third trimester, antepartum  O09.293    6. Anxiety and depression  F41.9     F32.9    7. Heterozygous MTHFR mutation [id number] ([facility]  E72.12         Return in about 7 weeks (around 2/2/2021) for postpartum visit .     Ofelia Pritchett MD

## 2020-12-15 NOTE — PROGRESS NOTES
Patient presents to unit from clinic for celestone shot and NST due to decreased FM during NST at office. She receives NSTs 2x/week for gestational diabetes and history of fetal demise at 44w7d. Denies any VB, LOF, or contractions. States positive fetal movement. Placed on EFM, NST button given, call light within reach.

## 2020-12-15 NOTE — PROGRESS NOTES
NON STRESS TEST INTERPRETATION    12/15/20    RE:  Kentrell Chang   : 1990   AGE: 26 y.o.     GESTATIONAL AGE:  37w5d      INDICATION:  GDM, history of IUFD at 38w5d, thrombophilia     TIME ON:  1302    TIME OFF:  1322      RESULT:   REACTIVE      FHR Baseline Rate:   150 bpm    PERIODIC CHANGES:    · Accelerations present, variability moderate, no decelerations noted    Anna Marie Cook MD

## 2020-12-15 NOTE — DISCHARGE SUMMARY
Patient presented today for first celestone injection and NST. Discharge home in stable condition.      Julius Santiago MD

## 2020-12-15 NOTE — PROGRESS NOTES
Patient alert and pleasant with no complaints. Here today for prenatal visit. Fetal heart tones obtained without difficulty. Urine for glucose obtained with negative results. Urine for protein obtained with +1 results. Discharge instructions have been discussed with the patient. Patient advised to call our office with any questions or concerns. Voiced understanding. Here today for NST   Placed on monitor 1:02 pm  Ended NST 1:22 pm  Results read by Yolande Ruelas  See doctors notes. Patient tolerated well.

## 2020-12-16 ENCOUNTER — HOSPITAL ENCOUNTER (OUTPATIENT)
Age: 30
Discharge: HOME | End: 2020-12-16
Attending: OBSTETRICS & GYNECOLOGY | Admitting: OBSTETRICS & GYNECOLOGY
Payer: COMMERCIAL

## 2020-12-16 PROCEDURE — 99211 OFF/OP EST MAY X REQ PHY/QHP: CPT

## 2020-12-16 PROCEDURE — 96372 THER/PROPH/DIAG INJ SC/IM: CPT

## 2020-12-16 PROCEDURE — 6360000002 HC RX W HCPCS: Performed by: OBSTETRICS & GYNECOLOGY

## 2020-12-16 RX ORDER — BETAMETHASONE SODIUM PHOSPHATE AND BETAMETHASONE ACETATE 3; 3 MG/ML; MG/ML
12 INJECTION, SUSPENSION INTRA-ARTICULAR; INTRALESIONAL; INTRAMUSCULAR; SOFT TISSUE ONCE
Status: COMPLETED | OUTPATIENT
Start: 2020-12-16 | End: 2020-12-16

## 2020-12-16 RX ADMIN — BETAMETHASONE SODIUM PHOSPHATE AND BETAMETHASONE ACETATE 12 MG: 3; 3 INJECTION, SUSPENSION INTRA-ARTICULAR; INTRALESIONAL; INTRAMUSCULAR at 14:31

## 2020-12-16 NOTE — PROGRESS NOTES
37.6wks IUP presents to unit for second dose of celestone. Administered R side, ventrogluteal. Given discharge instructions.  Pt left unit ambulatory

## 2020-12-17 ENCOUNTER — APPOINTMENT (OUTPATIENT)
Dept: LABOR AND DELIVERY | Age: 30
End: 2020-12-17
Payer: COMMERCIAL

## 2020-12-17 ENCOUNTER — ANESTHESIA EVENT (OUTPATIENT)
Dept: LABOR AND DELIVERY | Age: 30
End: 2020-12-17
Payer: COMMERCIAL

## 2020-12-17 ENCOUNTER — HOSPITAL ENCOUNTER (INPATIENT)
Age: 30
LOS: 2 days | Discharge: HOME | End: 2020-12-19
Attending: OBSTETRICS & GYNECOLOGY | Admitting: OBSTETRICS & GYNECOLOGY
Payer: COMMERCIAL

## 2020-12-17 ENCOUNTER — ANESTHESIA (OUTPATIENT)
Dept: LABOR AND DELIVERY | Age: 30
End: 2020-12-17
Payer: COMMERCIAL

## 2020-12-17 PROBLEM — Z3A.38 38 WEEKS GESTATION OF PREGNANCY: Status: ACTIVE | Noted: 2020-12-17

## 2020-12-17 PROBLEM — Z64.1 MULTIPARITY: Status: ACTIVE | Noted: 2020-12-17

## 2020-12-17 LAB
ABO/RH: NORMAL
AMPHETAMINE SCREEN, URINE: NOT DETECTED
ANTIBODY SCREEN: NORMAL
BARBITURATE SCREEN URINE: NOT DETECTED
BENZODIAZEPINE SCREEN, URINE: NOT DETECTED
CANNABINOID SCREEN URINE: NOT DETECTED
COCAINE METABOLITE SCREEN URINE: NOT DETECTED
FENTANYL SCREEN, URINE: NOT DETECTED
HCT VFR BLD CALC: 30.9 % (ref 34–48)
HEMOGLOBIN: 10.1 G/DL (ref 11.5–15.5)
Lab: NORMAL
MCH RBC QN AUTO: 28.3 PG (ref 26–35)
MCHC RBC AUTO-ENTMCNC: 32.7 % (ref 32–34.5)
MCV RBC AUTO: 86.6 FL (ref 80–99.9)
METHADONE SCREEN, URINE: NOT DETECTED
OPIATE SCREEN URINE: NOT DETECTED
OXYCODONE URINE: NOT DETECTED
PDW BLD-RTO: 13.5 FL (ref 11.5–15)
PHENCYCLIDINE SCREEN URINE: NOT DETECTED
PLATELET # BLD: 229 E9/L (ref 130–450)
PMV BLD AUTO: 10.6 FL (ref 7–12)
RBC # BLD: 3.57 E12/L (ref 3.5–5.5)
WBC # BLD: 9.8 E9/L (ref 4.5–11.5)

## 2020-12-17 PROCEDURE — 1220000001 HC SEMI PRIVATE L&D R&B

## 2020-12-17 PROCEDURE — 51701 INSERT BLADDER CATHETER: CPT

## 2020-12-17 PROCEDURE — 86850 RBC ANTIBODY SCREEN: CPT

## 2020-12-17 PROCEDURE — 80307 DRUG TEST PRSMV CHEM ANLYZR: CPT

## 2020-12-17 PROCEDURE — 2500000003 HC RX 250 WO HCPCS: Performed by: ANESTHESIOLOGY

## 2020-12-17 PROCEDURE — 85027 COMPLETE CBC AUTOMATED: CPT

## 2020-12-17 PROCEDURE — 2580000003 HC RX 258: Performed by: OBSTETRICS & GYNECOLOGY

## 2020-12-17 PROCEDURE — 86901 BLOOD TYPING SEROLOGIC RH(D): CPT

## 2020-12-17 PROCEDURE — 3700000025 EPIDURAL BLOCK: Performed by: ANESTHESIOLOGY

## 2020-12-17 PROCEDURE — 6360000002 HC RX W HCPCS: Performed by: OBSTETRICS & GYNECOLOGY

## 2020-12-17 PROCEDURE — 86900 BLOOD TYPING SEROLOGIC ABO: CPT

## 2020-12-17 PROCEDURE — 36415 COLL VENOUS BLD VENIPUNCTURE: CPT

## 2020-12-17 PROCEDURE — 10907ZC DRAINAGE OF AMNIOTIC FLUID, THERAPEUTIC FROM PRODUCTS OF CONCEPTION, VIA NATURAL OR ARTIFICIAL OPENING: ICD-10-PCS | Performed by: OBSTETRICS & GYNECOLOGY

## 2020-12-17 RX ORDER — SODIUM CHLORIDE 0.9 % (FLUSH) 0.9 %
10 SYRINGE (ML) INJECTION PRN
Status: DISCONTINUED | OUTPATIENT
Start: 2020-12-17 | End: 2020-12-18 | Stop reason: HOSPADM

## 2020-12-17 RX ORDER — ACETAMINOPHEN 650 MG
TABLET, EXTENDED RELEASE ORAL
Status: DISPENSED
Start: 2020-12-17 | End: 2020-12-17

## 2020-12-17 RX ORDER — SODIUM CHLORIDE 0.9 % (FLUSH) 0.9 %
10 SYRINGE (ML) INJECTION EVERY 12 HOURS SCHEDULED
Status: DISCONTINUED | OUTPATIENT
Start: 2020-12-17 | End: 2020-12-18 | Stop reason: HOSPADM

## 2020-12-17 RX ORDER — DOCUSATE SODIUM 100 MG/1
100 CAPSULE, LIQUID FILLED ORAL 2 TIMES DAILY
Status: DISCONTINUED | OUTPATIENT
Start: 2020-12-17 | End: 2020-12-18 | Stop reason: HOSPADM

## 2020-12-17 RX ORDER — NALOXONE HYDROCHLORIDE 0.4 MG/ML
0.4 INJECTION, SOLUTION INTRAMUSCULAR; INTRAVENOUS; SUBCUTANEOUS PRN
Status: DISCONTINUED | OUTPATIENT
Start: 2020-12-17 | End: 2020-12-18 | Stop reason: HOSPADM

## 2020-12-17 RX ORDER — ACETAMINOPHEN 325 MG/1
650 TABLET ORAL EVERY 4 HOURS PRN
Status: DISCONTINUED | OUTPATIENT
Start: 2020-12-17 | End: 2020-12-18 | Stop reason: HOSPADM

## 2020-12-17 RX ORDER — ONDANSETRON 2 MG/ML
4 INJECTION INTRAMUSCULAR; INTRAVENOUS EVERY 6 HOURS PRN
Status: DISCONTINUED | OUTPATIENT
Start: 2020-12-17 | End: 2020-12-18 | Stop reason: HOSPADM

## 2020-12-17 RX ORDER — SODIUM CHLORIDE, SODIUM LACTATE, POTASSIUM CHLORIDE, CALCIUM CHLORIDE 600; 310; 30; 20 MG/100ML; MG/100ML; MG/100ML; MG/100ML
INJECTION, SOLUTION INTRAVENOUS CONTINUOUS
Status: DISCONTINUED | OUTPATIENT
Start: 2020-12-17 | End: 2020-12-19 | Stop reason: HOSPADM

## 2020-12-17 RX ORDER — NALBUPHINE HCL 10 MG/ML
5 AMPUL (ML) INJECTION EVERY 4 HOURS PRN
Status: DISCONTINUED | OUTPATIENT
Start: 2020-12-17 | End: 2020-12-18 | Stop reason: HOSPADM

## 2020-12-17 RX ORDER — ONDANSETRON 2 MG/ML
4 INJECTION INTRAMUSCULAR; INTRAVENOUS EVERY 6 HOURS PRN
Status: DISCONTINUED | OUTPATIENT
Start: 2020-12-17 | End: 2020-12-19 | Stop reason: HOSPADM

## 2020-12-17 RX ORDER — LIDOCAINE HYDROCHLORIDE 10 MG/ML
INJECTION, SOLUTION INFILTRATION; PERINEURAL
Status: DISPENSED
Start: 2020-12-17 | End: 2020-12-17

## 2020-12-17 RX ADMIN — SODIUM CHLORIDE, POTASSIUM CHLORIDE, SODIUM LACTATE AND CALCIUM CHLORIDE: 600; 310; 30; 20 INJECTION, SOLUTION INTRAVENOUS at 06:45

## 2020-12-17 RX ADMIN — SODIUM CHLORIDE, POTASSIUM CHLORIDE, SODIUM LACTATE AND CALCIUM CHLORIDE: 600; 310; 30; 20 INJECTION, SOLUTION INTRAVENOUS at 06:37

## 2020-12-17 RX ADMIN — Medication 15 ML/HR: at 13:19

## 2020-12-17 RX ADMIN — SODIUM CHLORIDE, POTASSIUM CHLORIDE, SODIUM LACTATE AND CALCIUM CHLORIDE: 600; 310; 30; 20 INJECTION, SOLUTION INTRAVENOUS at 13:22

## 2020-12-17 RX ADMIN — Medication 1 MILLI-UNITS/MIN: at 08:15

## 2020-12-17 RX ADMIN — Medication 15 ML/HR: at 22:00

## 2020-12-17 ASSESSMENT — PAIN - FUNCTIONAL ASSESSMENT: PAIN_FUNCTIONAL_ASSESSMENT: 0-10

## 2020-12-17 NOTE — PROGRESS NOTES
Dr. Bouchra Walden notified of repetitive late decelerations and pitocin off. Uterine resuscitation done. FHT strip reviewed. Order for internal monitors and to leave pitocin off for now.

## 2020-12-17 NOTE — PROGRESS NOTES
Sara Mcgregor presents at 38w0d for scheduled IOL d/t GDM-diet controlled. States +FM. Denies VB/LOF. Placed on EFM, call light within reach.

## 2020-12-17 NOTE — PROGRESS NOTES
Dr. Francis Dietz called in. States that he reviewed FHTs. Determined baseline of 140s with accelerations. States to keep pitocin on and increase as needed.

## 2020-12-17 NOTE — ANESTHESIA PROCEDURE NOTES
Epidural Block    Patient location during procedure: OB  Start time: 12/17/2020 1:03 PM  End time: 12/17/2020 1:20 PM  Reason for block: labor epidural  Staffing  Performed: resident/CRNA   Anesthesiologist: Beto Sam MD  Resident/CRNA: AIDEN James CRNA  Preanesthetic Checklist  Completed: patient identified, IV checked, site marked, risks and benefits discussed, surgical consent, monitors and equipment checked, pre-op evaluation, timeout performed, anesthesia consent given, oxygen available and patient being monitored  Epidural  Patient position: sitting  Prep: ChloraPrep  Patient monitoring: continuous pulse ox and frequent blood pressure checks  Approach: midline  Location: lumbar (1-5)  Injection technique: RANDY air  Provider prep: sterile gloves and mask  Needle  Needle type: Tuohy   Needle gauge: 18 G  Needle length: 3.5 in  Needle insertion depth: 7 cm  Catheter type: side hole  Catheter size: 20g.   Catheter at skin depth: 12 cm  Test dose: negative  Assessment  Hemodynamics: stable  Attempts: 1

## 2020-12-17 NOTE — H&P
Department of Obstetrics and Gynecology  Attending Obstetrics History and Physical      HISTORY OF PRESENT ILLNESS:      The patient is a 26 y.o.  6 parity 2 at 38 weeks 0 days. Here for scheduled induction. History of fetal demise. Some irregular ctx's. Estimated Due Date:  20  Contractions:  Yes  Leaking of fluid: no  nfm  Blleeding:  No    PRENATAL CARE:    Complications: No  Group B Strep: Negative    Active Problems:    38 weeks gestation of pregnancy  Resolved Problems:    * No resolved hospital problems.  *        PAST OB HISTORY    OB History    Para Term  AB Living   6 2 2   3 1   SAB TAB Ectopic Molar Multiple Live Births   2 1       1      # Outcome Date GA Lbr Herson/2nd Weight Sex Delivery Anes PTL Lv   6 Current            5 TAB 2019           4 2019           3 2019           2 Term 17 37w2d  6 lb 9 oz (2.977 kg) F Vag-Spont EPI N DARIUS   1 Term 16 38w5d  6 lb 10 oz (3.005 kg) F Vag-Spont EPI N FD           Pre-eclampsia:  No      :  No      D & C:  Yes      Cerclage:  No      LEEP:  No      Myomectomy:  No       Labor: No    Past Medical History:    Past Medical History:   Diagnosis Date    Abnormal Pap smear of cervix     will repeat after delivery    Gestational diabetes mellitus (GDM) affecting pregnancy, antepartum 2020        Past Surgical History:    Past Surgical History:   Procedure Laterality Date    DILATION AND CURETTAGE  2019    X 2    MASTOIDECTOMY      TYMPANOSTOMY TUBE PLACEMENT          Past Family History:  Family History   Problem Relation Age of Onset    Depression Maternal Grandmother     Cancer Maternal Grandmother 48        breast    Depression Mother     Cancer Mother         melanoma    Depression Maternal Aunt        ROS:  Const: No fever, chills, night sweats, no recent unexplained weight gain/loss HEENT: No blurred vision, double vision; no ear problems; no sore throat, congestion; no running nose. Resp: No cough, no sputum, no pleuritic chest pain, no sob  Cardio: No chest pain, no exertional dyspnea, no PND, no orthopnea, no palpitation, no leg swelling. GI: No dysphagia, no reflux; no abdominal pain, no n/v; no c/d. No hematochezia    : No dysuria, no frequency, hesitancy; no hematuria  MSK: no joint pain, no myalgia, no change in ROM  Neuro: no focal weakness in extremities, no slurred speech, no double vision, no numbness or tingling in extremities  Endo: no heat/cold intolerance, no polyphagia, polydipsia or polyuria  Hem: no increased bleeding, no bruising, no lymphadenopathy  Skin: no skin changes  Psych: no depressed mood, no suicidal ideation    Social History:     reports that she quit smoking about 6 years ago. Her smoking use included cigarettes. She smoked 0.20 packs per day. She has never used smokeless tobacco. She reports that she does not drink alcohol or use drugs. Medications Prior to Admission:  Medications Prior to Admission: Doxylamine Succinate, Sleep, (UNISOM PO), Take 1 tablet by mouth nightly as needed  sertraline (ZOLOFT) 25 MG tablet, Take 1 tablet by mouth daily  aspirin 81 MG chewable tablet, Take 81 mg by mouth daily  diphenhydrAMINE HCl, Sleep, (UNISOM SLEEPMELTS PO), Take by mouth  Prenatal Vit-Fe Fumarate-FA (PRENATAL PO), Take by mouth  vitamin B-6 (PYRIDOXINE) 50 MG tablet, Take 50 mg by mouth daily  Lancets MISC, 4 times daily. Do fasting blood sugar+ test blood sugar 2 hours after each meal  blood glucose monitor strips, Use In Vitro route 4 times daily do fasting blood sugar+ test blood sugar 2 hours after each meal  glucose monitoring kit (FREESTYLE) monitoring kit, Dispense one meter    Allergies:  Patient has no known allergies.     PHYSICAL EXAM: /74   Pulse 91   Temp 98.3 °F (36.8 °C) (Oral)   Resp 16   Ht 5' 4\" (1.626 m)   Wt 214 lb (97.1 kg)   LMP 03/26/2020   BMI 36.73 kg/m²   General appearance: Comfortable  Lungs:  CTA   Heart:  Regular Rhythm  Abdomen:  Soft, non-tender, gravid  Fetal heart rate:  reactive  Cervix:    DILATION: 2 cm  EFFACEMENT:   Long  STATION:  -2 cm  CONSISTENCY:  soft  POSITION:  posterior  Presentation:Cephalic, confirmed by u/s  Contraction frequency:  irregular  Membranes:  Intact      ASSESSMENT     IUP at 38 weeks. gdm  History of stillbirth.  Induction recommended at 38 wks by mfm  gbs neg           Plan: start pitocin          Electronically signed by Martha Regalado MD on 12/17/2020 at 8:09 AM

## 2020-12-17 NOTE — ANESTHESIA PRE PROCEDURE
Department of Anesthesiology  Preprocedure Note       Name:  Kaveh Thurman   Age:  26 y.o.  :  1990                                          MRN:  [id number]         Date:  2020      Surgeon: * No surgeons listed *    Procedure: * No procedures listed *    Medications prior to admission:   Prior to Admission medications    Medication Sig Start Date End Date Taking? Authorizing Provider   Doxylamine Succinate, Sleep, (UNISOM PO) Take 1 tablet by mouth nightly as needed   Yes Historical Provider, MD   sertraline (ZOLOFT) 25 MG tablet Take 1 tablet by mouth daily 20  Yes Leah Osborne MD   aspirin 81 MG chewable tablet Take 81 mg by mouth daily   Yes Historical Provider, MD   diphenhydrAMINE HCl, Sleep, (UNISOM SLEEPMELTS PO) Take by mouth   Yes Historical Provider, MD   Prenatal Vit-Fe Fumarate-FA (PRENATAL PO) Take by mouth   Yes Historical Provider, MD   vitamin B-6 (PYRIDOXINE) 50 MG tablet Take 50 mg by mouth daily    Historical Provider, MD   Lancets MISC 4 times daily.  Do fasting blood sugar+ test blood sugar 2 hours after each meal 20   Vik Stone MD   blood glucose monitor strips Use In Vitro route 4 times daily do fasting blood sugar+ test blood sugar 2 hours after each meal 20   Vik Stone MD   glucose monitoring kit (FREESTYLE) monitoring kit Dispense one meter 20   Vik Stone MD       Current medications:    Current Facility-Administered Medications   Medication Dose Route Frequency Provider Last Rate Last Admin    lactated ringers infusion   Intravenous Continuous Maryan Krishnamurthy  mL/hr at 20 0637 New Bag at 20 0637    oxytocin (PITOCIN) 10 unit bolus from the bag  10 Units Intravenous PRN Maryan Krishnamurthy MD        And    oxytocin (PITOCIN) 30 units in 500 mL infusion  87.3 irvin-units/min Intravenous PRN Maryan Krishnamurthy MD  ondansetron (ZOFRAN) injection 4 mg  4 mg Intravenous Q6H PRN Kody Hendrix MD        oxytocin (PITOCIN) 30 units in 500 mL infusion  1 irvin-units/min Intravenous Continuous Kody Hendrix MD 8 mL/hr at 12/17/20 1100 8 irvin-units/min at 12/17/20 1100    lactated ringers infusion   Intravenous Continuous Kody Hendrix  mL/hr at 12/17/20 0645 Given by Other Clinician at 12/17/20 0645    sodium chloride flush 0.9 % injection 10 mL  10 mL Intravenous 2 times per day Kody Hendrix MD        sodium chloride flush 0.9 % injection 10 mL  10 mL Intravenous PRN Kody Hendrix MD        ondansetron [facility]) injection 4 mg  4 mg Intravenous Q6H PRN Kody Hendrix MD        acetaminophen (TYLENOL) tablet 650 mg  650 mg Oral Q4H PRN Kody Hendrix MD        docusate sodium (COLACE) capsule 100 mg  100 mg Oral BID Kody Hendrix MD        butorphanol (STADOL) injection 1 mg  1 mg Intravenous Q3H PRN Kody Hendrix MD           Allergies:  No Known Allergies    Problem List:    Patient Active Problem List   Diagnosis Code    Obesity E66.9    History of stillbirth in currently pregnant patient O09.299    Heterozygous MTHFR mutation L9869S ([facility] E72.12    Thrombophilia affecting pregnancy in third trimester, antepartum ([facility] O99.113, D68.59    Recurrent pregnancy loss in pregnant patient in third trimester, antepartum O26.23    Diet controlled gestational diabetes mellitus (GDM) in third trimester O20.18    Low lying placenta nos or without hemorrhage, second trimester O44.42    Fetal heart rate decelerations affecting management of mother Z93.0456    NST (non-stress test) reactive Z36.89    History of unexplained stillbirth Z87.59    37 weeks gestation of pregnancy Z3A.37    38 weeks gestation of pregnancy Z3A.38    Multiparity Z64.1       Past Medical History:        Diagnosis Date    Abnormal Pap smear of cervix 2020    will repeat after delivery  Gestational diabetes mellitus (GDM) affecting pregnancy, antepartum 2020       Past Surgical History:        Procedure Laterality Date    DILATION AND CURETTAGE  2019    X 2    MASTOIDECTOMY      TYMPANOSTOMY TUBE PLACEMENT         Social History:    Social History     Tobacco Use    Smoking status: Former Smoker     Packs/day: 0.20     Types: Cigarettes     Quit date:      Years since quittin.9    Smokeless tobacco: Never Used   Substance Use Topics    Alcohol use: No                                Counseling given: Not Answered      Vital Signs (Current):   Vitals:    20 0746 20 0958 20 1029 20 1058   BP: 117/74 113/79 107/63 121/77   Pulse: 91 98 85 96   Resp:    Temp: 36.8 °C (98.3 °F)   36.7 °C (98.1 °F)   TempSrc: Oral   Oral   Weight:       Height:                                                  BP Readings from Last 3 Encounters:   20 121/77   12/15/20 (!) 119/56   12/15/20 126/82       NPO Status: Time of last liquid consumption: 430                        Time of last solid consumption: 430                        Date of last liquid consumption: 20                        Date of last solid food consumption: 20    BMI:   Wt Readings from Last 3 Encounters:   20 214 lb (97.1 kg)   12/15/20 213 lb (96.6 kg)   12/10/20 214 lb (97.1 kg)     Body mass index is 36.73 kg/m².     CBC:   Lab Results   Component Value Date    WBC 9.8 2020    RBC 3.57 2020    HGB 10.1 2020    HCT 30.9 2020    MCV 86.6 2020    RDW 13.5 2020     2020       CMP:   Lab Results   Component Value Date     2015    K 3.5 2015     2015    CO2 22 2015    BUN 12 2015    CREATININE 0.5 2015    GFRAA >60 2015    LABGLOM >60 2015    GLUCOSE 149 2020    GLUCOSE 98 2015    PROT 6.9 2015    CALCIUM 9.3 2015    BILITOT 0.3 2015 ALKPHOS 57 08/31/2015    AST 19 08/31/2015    ALT 13 08/31/2015       POC Tests: No results for input(s): POCGLU, POCNA, POCK, POCCL, POCBUN, POCHEMO, POCHCT in the last 72 hours. Coags:   Lab Results   Component Value Date    PROTIME 14.5 03/02/2012    INR 1.8 03/02/2012    APTT 25.4 03/02/2012       HCG (If Applicable):   Lab Results   Component Value Date    PREGTESTUR positive 05/21/2020    PREGSERUM NEGATIVE 03/02/2012        ABGs: No results found for: PHART, PO2ART, IBT9XEA, MMU0GDC, BEART, P0YIFYEC     Type & Screen (If Applicable):  No results found for: LABABO, LABRH    Drug/Infectious Status (If Applicable):  No results found for: HIV, HEPCAB    COVID-19 Screening (If Applicable): No results found for: COVID19      Anesthesia Evaluation  Patient summary reviewed and Nursing notes reviewed   history of anesthetic complications: PONV. Airway: Mallampati: III  TM distance: >3 FB     Mouth opening: > = 3 FB Dental:      Comment: Denies chipped, loose, missing teeth. Pulmonary:Negative Pulmonary ROS   (+) decreased breath sounds,                            ROS comment: Former smoker, quit in 2014   Cardiovascular:Negative CV ROS          ECG reviewed  Rhythm: regular  Rate: normal           Beta Blocker:  Not on Beta Blocker         Neuro/Psych:   (+) depression/anxiety              ROS comment: On zoloft GI/Hepatic/Renal:   (+) GERD (taking tums,): poorly controlled,           Endo/Other:    (+) Diabetes (gestational diabetes, diet controlled.)well controlled, , blood dyscrasia (Thrombophilia affecting pregnancy in third trimester, antepartum, was taking aspirin 81mg, stopped 12/13/2020)::., .                 Abdominal:           Vascular: negative vascular ROS. Anesthesia Plan      epidural     ASA 2             Anesthetic plan and risks discussed with patient. Use of blood products discussed with patient and spouse whom consented to blood products. Plan discussed with CRNA.                   Rob Jorgensen RN   12/17/2020

## 2020-12-18 PROCEDURE — 1220000000 HC SEMI PRIVATE OB R&B

## 2020-12-18 PROCEDURE — 0UQMXZZ REPAIR VULVA, EXTERNAL APPROACH: ICD-10-PCS | Performed by: OBSTETRICS & GYNECOLOGY

## 2020-12-18 PROCEDURE — 59409 OBSTETRICAL CARE: CPT | Performed by: MIDWIFE

## 2020-12-18 PROCEDURE — 6360000002 HC RX W HCPCS: Performed by: OBSTETRICS & GYNECOLOGY

## 2020-12-18 PROCEDURE — 6370000000 HC RX 637 (ALT 250 FOR IP): Performed by: MIDWIFE

## 2020-12-18 PROCEDURE — 7200000001 HC VAGINAL DELIVERY

## 2020-12-18 PROCEDURE — 51701 INSERT BLADDER CATHETER: CPT

## 2020-12-18 PROCEDURE — 2500000003 HC RX 250 WO HCPCS: Performed by: ANESTHESIOLOGY

## 2020-12-18 PROCEDURE — 2580000003 HC RX 258: Performed by: OBSTETRICS & GYNECOLOGY

## 2020-12-18 RX ORDER — ACETAMINOPHEN 325 MG/1
650 TABLET ORAL EVERY 4 HOURS PRN
Status: DISCONTINUED | OUTPATIENT
Start: 2020-12-18 | End: 2020-12-19 | Stop reason: HOSPADM

## 2020-12-18 RX ORDER — FERROUS SULFATE 325(65) MG
325 TABLET ORAL 2 TIMES DAILY WITH MEALS
Status: DISCONTINUED | OUTPATIENT
Start: 2020-12-18 | End: 2020-12-19 | Stop reason: HOSPADM

## 2020-12-18 RX ORDER — IBUPROFEN 800 MG/1
800 TABLET ORAL EVERY 6 HOURS PRN
Status: DISCONTINUED | OUTPATIENT
Start: 2020-12-18 | End: 2020-12-19 | Stop reason: HOSPADM

## 2020-12-18 RX ORDER — MODIFIED LANOLIN
OINTMENT (GRAM) TOPICAL PRN
Status: DISCONTINUED | OUTPATIENT
Start: 2020-12-18 | End: 2020-12-19 | Stop reason: HOSPADM

## 2020-12-18 RX ORDER — DOCUSATE SODIUM 100 MG/1
100 CAPSULE, LIQUID FILLED ORAL 2 TIMES DAILY
Status: DISCONTINUED | OUTPATIENT
Start: 2020-12-18 | End: 2020-12-19 | Stop reason: HOSPADM

## 2020-12-18 RX ADMIN — DOCUSATE SODIUM 100 MG: 100 CAPSULE ORAL at 20:30

## 2020-12-18 RX ADMIN — Medication 15 ML/HR: at 06:32

## 2020-12-18 RX ADMIN — IBUPROFEN 800 MG: 800 TABLET, FILM COATED ORAL at 15:12

## 2020-12-18 RX ADMIN — SODIUM CHLORIDE, POTASSIUM CHLORIDE, SODIUM LACTATE AND CALCIUM CHLORIDE: 600; 310; 30; 20 INJECTION, SOLUTION INTRAVENOUS at 02:07

## 2020-12-18 RX ADMIN — Medication 166.7 ML: at 07:25

## 2020-12-18 RX ADMIN — Medication 87.3 MILLI-UNITS/MIN: at 07:35

## 2020-12-18 RX ADMIN — SODIUM CHLORIDE, POTASSIUM CHLORIDE, SODIUM LACTATE AND CALCIUM CHLORIDE: 600; 310; 30; 20 INJECTION, SOLUTION INTRAVENOUS at 04:02

## 2020-12-18 ASSESSMENT — PAIN SCALES - GENERAL: PAINLEVEL_OUTOF10: 3

## 2020-12-18 NOTE — PROGRESS NOTES
Pt brought over to room by L+D nurse in stable condition. Was able to transfer self into bed. IV LR infusing at 125cc/hr and IV pitocin infusing at 85cc/hr. Fundus firm, midline and 1 finger above umbilicus. Mod amt lochia rubra on ice pack and armando pad. Instructed to call nurse prior to getting up to BR. Alert and oriented x 2 and denies pain. Bedside pamphlets reviewed with pt-verbalized understanding-gave verbal permission for baby to receive Hep B vaccine. Oriented to room and unit and unit policies including infant safety and COVID 19 policies.

## 2020-12-18 NOTE — PROGRESS NOTES
CRNA notified pt is uncomfortable again with her epidural. States he will come see pt to possibly re-bolus

## 2020-12-18 NOTE — PROGRESS NOTES
Called for pt having 10/10 pain, pt bolused 10cc of 0.25% bupiv via epidural. VSS will cont. To follow.

## 2020-12-18 NOTE — LACTATION NOTE
Experienced mom reports baby nursed well in LD, sleepy now. Encouraged skin to skin and frequent attempts at breast to stimulate milk production. Instructed on normal infant behavior in the first 12-24 hours and importance of stimulating the baby frequently to eat during this time. Reviewed hand expression, and encouraged to hand express drops of colostrum when baby is sleepy. Instructed that baby may also feed 8-12 times a day- cluster feeding at times- as her milk supply is being established. Instructed on benefits of skin to skin and avoidance of pacifier / artificial nipple use until breastfeeding is well established. Educated on making sure infant has an open airway while breastfeeding and skin to skin. Instructed on hunger cues and waking techniques to try. Reviewed signs of adequate I & O; allow baby to feed ad sukhjinder and not to limit time at breast. Information given regarding health benefits of colostrum and exclusive breastfeeding. Encouraged to call with any concerns. Mom has a breast pump for home use. Lactation office # and [employer] brandon information supplied for educational needs.

## 2020-12-18 NOTE — L&D DELIVERY NOTE
Arrived in room as patient was complete, feeling pressure and urge to push, very uncomfortable. Dr. Ezra Polanco in the [address] [place] [address]. Pt pushed effectively through 3-4 contractions,  liveborn female weighing 7#9, Apgars not available. Nuchal cord x 1, unable to reduce, baby somersaulted out and cord reduced after delivery of body. Vigorous at delivery; dried, stimulated and bulb suctioned on mother's abdomen. Cord clamped x 2 and cut after 90 second delay. Cord gasses and cord blood obtained and sent. Placenta spontaneous, complete, 3V cord. Fundus massaged to firm, small clots expressed, EBL ~500 cc. 1st degree left labial laceration repaired with 3-0 vicryl under epidural anesthesia. Patient tolerated well. Plans to breastfeed. Dr. Ezra Polanco updated.

## 2020-12-19 VITALS
RESPIRATION RATE: 18 BRPM | BODY MASS INDEX: 36.54 KG/M2 | SYSTOLIC BLOOD PRESSURE: 110 MMHG | HEART RATE: 92 BPM | WEIGHT: 214 LBS | TEMPERATURE: 98.3 F | OXYGEN SATURATION: 98 % | HEIGHT: 64 IN | DIASTOLIC BLOOD PRESSURE: 64 MMHG

## 2020-12-19 LAB
HCT VFR BLD CALC: 27.5 % (ref 34–48)
HEMOGLOBIN: 8.9 G/DL (ref 11.5–15.5)

## 2020-12-19 PROCEDURE — 36415 COLL VENOUS BLD VENIPUNCTURE: CPT

## 2020-12-19 PROCEDURE — 6370000000 HC RX 637 (ALT 250 FOR IP): Performed by: MIDWIFE

## 2020-12-19 PROCEDURE — 85018 HEMOGLOBIN: CPT

## 2020-12-19 PROCEDURE — 85014 HEMATOCRIT: CPT

## 2020-12-19 RX ORDER — FERROUS SULFATE 325(65) MG
325 TABLET ORAL
Qty: 30 TABLET | Refills: 2 | Status: SHIPPED | OUTPATIENT
Start: 2020-12-19

## 2020-12-19 RX ORDER — IBUPROFEN 800 MG/1
800 TABLET ORAL EVERY 8 HOURS PRN
Qty: 18 TABLET | Refills: 0 | Status: SHIPPED | OUTPATIENT
Start: 2020-12-19

## 2020-12-19 RX ADMIN — IBUPROFEN 800 MG: 800 TABLET, FILM COATED ORAL at 16:26

## 2020-12-19 RX ADMIN — FERROUS SULFATE TAB 325 MG (65 MG ELEMENTAL FE) 325 MG: 325 (65 FE) TAB at 08:17

## 2020-12-19 RX ADMIN — Medication: at 08:17

## 2020-12-19 RX ADMIN — DOCUSATE SODIUM 100 MG: 100 CAPSULE ORAL at 08:17

## 2020-12-19 RX ADMIN — IBUPROFEN 800 MG: 800 TABLET, FILM COATED ORAL at 05:37

## 2020-12-19 ASSESSMENT — PAIN SCALES - GENERAL
PAINLEVEL_OUTOF10: 2
PAINLEVEL_OUTOF10: 2

## 2020-12-19 NOTE — PLAN OF CARE
Problem: Fluid Volume - Imbalance:  Goal: Absence of imbalanced fluid volume signs and symptoms  Description: Absence of imbalanced fluid volume signs and symptoms  Outcome: Met This Shift  Goal: Absence of intrapartum hemorrhage signs and symptoms  Description: Absence of intrapartum hemorrhage signs and symptoms  Outcome: Met This Shift  Goal: Absence of postpartum hemorrhage signs and symptoms  Description: Absence of postpartum hemorrhage signs and symptoms  Outcome: Met This Shift     Problem: Infection - Intrapartum Infection:  Goal: Will show no infection signs and symptoms  Description: Will show no infection signs and symptoms  Outcome: Met This Shift     Problem: Pain - Acute:  Goal: Pain level will decrease  Description: Pain level will decrease  Outcome: Met This Shift  Goal: Able to cope with pain  Description: Able to cope with pain  Outcome: Met This Shift     Problem: Infection - Risk of, Puerperal Infection:  Goal: Will show no infection signs and symptoms  Description: Will show no infection signs and symptoms  Outcome: Met This Shift     Problem: Mood - Altered:  Goal: Mood stable  Description: Mood stable  Outcome: Met This Shift

## 2020-12-19 NOTE — PROGRESS NOTES
Discharge and follow up instructions given with mother/infant teaching and verbally understood. Discharged home per w/c in stable condition with infant.

## 2020-12-19 NOTE — PROGRESS NOTES
Subjective:    Patient without complaints. Normal lochia. Wants to go home. Objective:  /62   Pulse 89   Temp 98.3 °F (36.8 °C) (Oral)   Resp 16   Ht 5' 4\" (1.626 m)   Wt 214 lb (97.1 kg)   LMP 03/26/2020   SpO2 98%   Breastfeeding Unknown   BMI 36.73 kg/m²    Lungs cta  Heart rrr  Abdomen:  Uterus firm, non-tender  Extremities:  No calf pain    Assessment:  Post-partum day # 1   Vaginal delivery  Anemia acute and chronic  gdm    Plan:Discharge home. Office in 6 wks. Call if pain, fever,heavy bleeding, calf pain, shortness of breath,breast pain or redness or concerns. Nothing in vagina or heavy lifting.

## 2021-01-08 NOTE — DISCHARGE SUMMARY
[address] [address]                               DISCHARGE SUMMARY    PATIENT NAME: Kimberlee Gautam                        :        1990  MED REC NO:   [id number]                            ROOM:       8542  ACCOUNT NO:   [de-identified]                           ADMIT DATE: 2020  PROVIDER:     Steff Rodriguez MD                  [facility] DATE:  2020    The patient was admitted on 2020. The patient was admitted for  scheduled induction. The patient was at 38 weeks, had a history of  fetal demise. I was the house officer covering that day. The patient  was delivered on 2020 by Rony Pak. Infant weighed 7 pounds 9  ounces. Apgars were not available at that time. I was the house  officer covering on 2020. The patient had no complaints. Blood  pressure 103/62. The patient was discharged home in stable condition  and was instructed to follow up in the clinic in six weeks. Precautions  were given.         Mateo Romero MD    D: 2021 10:29:41       T: 2021 12:06:54     [email or web address]  Job#: [id number]     Doc#: [id number]    CC:

## 2021-02-04 ENCOUNTER — POSTPARTUM VISIT (OUTPATIENT)
Dept: OBGYN | Age: 31
End: 2021-02-04
Payer: COMMERCIAL

## 2021-02-04 VITALS
TEMPERATURE: 97 F | WEIGHT: 202 LBS | BODY MASS INDEX: 34.49 KG/M2 | SYSTOLIC BLOOD PRESSURE: 135 MMHG | DIASTOLIC BLOOD PRESSURE: 72 MMHG | HEART RATE: 135 BPM | HEIGHT: 64 IN

## 2021-02-04 DIAGNOSIS — O24.410 DIET CONTROLLED GESTATIONAL DIABETES MELLITUS (GDM) IN THIRD TRIMESTER: ICD-10-CM

## 2021-02-04 PROBLEM — Z3A.38 38 WEEKS GESTATION OF PREGNANCY: Status: RESOLVED | Noted: 2020-12-17 | Resolved: 2021-02-04

## 2021-02-04 PROBLEM — O99.113 THROMBOPHILIA AFFECTING PREGNANCY IN THIRD TRIMESTER, ANTEPARTUM (HCC): Status: RESOLVED | Noted: 2017-02-02 | Resolved: 2021-02-04

## 2021-02-04 PROBLEM — D68.59 THROMBOPHILIA AFFECTING PREGNANCY IN THIRD TRIMESTER, ANTEPARTUM (HCC): Status: RESOLVED | Noted: 2017-02-02 | Resolved: 2021-02-04

## 2021-02-04 PROBLEM — O26.23 RECURRENT PREGNANCY LOSS IN PREGNANT PATIENT IN THIRD TRIMESTER, ANTEPARTUM: Status: RESOLVED | Noted: 2020-06-18 | Resolved: 2021-02-04

## 2021-02-04 PROBLEM — O36.8390 FETAL HEART RATE DECELERATIONS AFFECTING MANAGEMENT OF MOTHER: Status: RESOLVED | Noted: 2020-10-29 | Resolved: 2021-02-04

## 2021-02-04 PROBLEM — O44.42 LOW LYING PLACENTA NOS OR WITHOUT HEMORRHAGE, SECOND TRIMESTER: Status: RESOLVED | Noted: 2020-07-23 | Resolved: 2021-02-04

## 2021-02-04 PROBLEM — Z64.1 MULTIPARITY: Status: RESOLVED | Noted: 2020-12-17 | Resolved: 2021-02-04

## 2021-02-04 PROBLEM — Z36.89 NST (NON-STRESS TEST) REACTIVE: Status: RESOLVED | Noted: 2020-11-16 | Resolved: 2021-02-04

## 2021-02-04 PROBLEM — Z3A.37 37 WEEKS GESTATION OF PREGNANCY: Status: RESOLVED | Noted: 2020-12-15 | Resolved: 2021-02-04

## 2021-02-04 PROCEDURE — 99213 OFFICE O/P EST LOW 20 MIN: CPT | Performed by: OBSTETRICS & GYNECOLOGY

## 2021-02-04 NOTE — PROGRESS NOTES
Diana Garcia     Patient presents for postpartum visit. Patient is s/p  20. She had a first degree laceration. Patient is doing well. Denies problems with bowel and bladder. Baby is doing well. Patient is bottle feeding. Patient has good support at home. Denies feeling sad/ overwhelmed. Patient had gestational diabetes in pregnancy, aware she will need 2 hour glucose tolerance test. She must be fasting for this. Discussed birth control with patient. Patient wants a paragard IUD. Advised to call [facility]. Patient had ASCUS with negative HPV 2020.        Past Medical History:   Diagnosis Date    Abnormal Pap smear of cervix     will repeat after delivery    Gestational diabetes mellitus (GDM) affecting pregnancy, antepartum 2020        Past Surgical History:   Procedure Laterality Date    DILATION AND CURETTAGE  2019    X 2    MASTOIDECTOMY      TYMPANOSTOMY TUBE PLACEMENT          Family History   Problem Relation Age of Onset    Depression Maternal Grandmother     Cancer Maternal Grandmother 48        breast    Depression Mother     Cancer Mother         melanoma    Depression Maternal Aunt         Social History     Tobacco History     Smoking Status  Former Smoker Quit date  2014 Smoking Frequency  0.2 packs/day Smoking Tobacco Type  Cigarettes    Smokeless Tobacco Use  Never Used          Alcohol History     Alcohol Use Status  No          Drug Use     Drug Use Status  No          Sexual Activity     Sexually Active  Yes Partners  Male                  Current Outpatient Medications:     sertraline (ZOLOFT) 25 MG tablet, Take 1 tablet by mouth daily, Disp: 30 tablet, Rfl: 6    ibuprofen (ADVIL;MOTRIN) 800 MG tablet, Take 1 tablet by mouth every 8 hours as needed for Pain (Patient not taking: Reported on 2021), Disp: 18 tablet, Rfl: 0   ferrous sulfate (IRON 325) 325 (65 Fe) MG tablet, Take 1 tablet by mouth daily (with breakfast) (Patient not taking: Reported on 2/4/2021), Disp: 30 tablet, Rfl: 2    aspirin 81 MG chewable tablet, Take 81 mg by mouth daily, Disp: , Rfl:     Prenatal Vit-Fe Fumarate-FA (PRENATAL PO), Take by mouth, Disp: , Rfl:      No Known Allergies     Vitals:    02/04/21 1525   BP: 135/72   Pulse: 135   Temp: 97 °F (36.1 °C)        Physical Exam:  General: pleasant, alert     Breasts: deferred     Pelvic exam: well healed vaginal repair     Annika Nobles was seen today for postpartum care. Diagnoses and all orders for this visit:    Postpartum care and examination    Normal delivery    Diet controlled gestational diabetes mellitus (GDM) in third trimester  -     GLUCOSE TOLERANCE, 2 HOURS; Future          Return for Annual in May .      Ap Quintana MD

## 2021-02-04 NOTE — PROGRESS NOTES
Patient alert and pleasant with no complaints  Here today for postpartum visit  Depression screening obtained with a score of zero  Pelvic exam, no specimens obtained. Discharge instructions have been discussed with the patient. Patient advised to call our office with any questions or concerns. Voiced understanding.

## 2022-01-27 ENCOUNTER — TELEPHONE (OUTPATIENT)
Dept: OBGYN | Age: 32
End: 2022-01-27

## 2022-01-27 NOTE — TELEPHONE ENCOUNTER
Patient called stating that as she was removing her menstrual cup, she felt cramping and realized that her ParaGard was coming out. Advised the patient that if she can, with ease, to remove it. Patient states the rest of it came out fine with no additional camping. Patient aware that we cannot replace the ParaGard. Asked if she would like an appointment next week to speak w/ Dr. Saravanan Ulloa. Patient states she is fine and does not think she needs an appointment. Told patient to call us if she has any other problems or if she chances he mind re; an appointment. patient verbalized understanding.

## 2022-02-01 ENCOUNTER — TELEPHONE (OUTPATIENT)
Dept: OBGYN | Age: 32
End: 2022-02-01

## 2022-02-01 RX ORDER — NORETHINDRONE ACETATE AND ETHINYL ESTRADIOL 1.5-30(21)
1 KIT ORAL DAILY
Qty: 3 PACKET | Refills: 3 | Status: SHIPPED | OUTPATIENT
Start: 2022-02-01

## 2022-02-01 NOTE — TELEPHONE ENCOUNTER
Spoke to patient. Patient would like to try continuous OCP. Medication reviewed. All questions were answered. Thank you.

## 2022-02-22 ENCOUNTER — TELEPHONE (OUTPATIENT)
Dept: OBGYN | Age: 32
End: 2022-02-22

## 2022-02-22 DIAGNOSIS — F41.9 ANXIETY AND DEPRESSION: ICD-10-CM

## 2022-02-22 DIAGNOSIS — F32.A ANXIETY AND DEPRESSION: ICD-10-CM

## 2022-02-22 RX ORDER — SERTRALINE HYDROCHLORIDE 25 MG/1
25 TABLET, FILM COATED ORAL DAILY
Qty: 90 TABLET | Refills: 3 | Status: SHIPPED | OUTPATIENT
Start: 2022-02-22

## 2022-02-22 NOTE — TELEPHONE ENCOUNTER
Patient left message on prescription line requesting a refill on her Zoloft. She stated her insurance requires a 3 month supply. She confirmed pharmacy as [facility] in [place].

## 2022-05-06 ENCOUNTER — PROCEDURE VISIT (OUTPATIENT)
Dept: AUDIOLOGY | Age: 32
End: 2022-05-06
Payer: COMMERCIAL

## 2022-05-06 ENCOUNTER — OFFICE VISIT (OUTPATIENT)
Dept: ENT CLINIC | Age: 32
End: 2022-05-06
Payer: COMMERCIAL

## 2022-05-06 VITALS
HEART RATE: 90 BPM | HEIGHT: 64 IN | WEIGHT: 190 LBS | DIASTOLIC BLOOD PRESSURE: 81 MMHG | SYSTOLIC BLOOD PRESSURE: 117 MMHG | BODY MASS INDEX: 32.44 KG/M2

## 2022-05-06 DIAGNOSIS — H71.92 CHOLESTEATOMA OF LEFT EAR: Primary | ICD-10-CM

## 2022-05-06 DIAGNOSIS — H90.12 CONDUCTIVE HEARING LOSS OF LEFT EAR WITH UNRESTRICTED HEARING OF RIGHT EAR: ICD-10-CM

## 2022-05-06 DIAGNOSIS — H90.12 CONDUCTIVE HEARING LOSS OF LEFT EAR WITH UNRESTRICTED HEARING OF RIGHT EAR: Primary | ICD-10-CM

## 2022-05-06 DIAGNOSIS — H65.92 FLUID LEVEL BEHIND TYMPANIC MEMBRANE OF LEFT EAR: ICD-10-CM

## 2022-05-06 PROCEDURE — 99204 OFFICE O/P NEW MOD 45 MIN: CPT | Performed by: NURSE PRACTITIONER

## 2022-05-06 PROCEDURE — 92557 COMPREHENSIVE HEARING TEST: CPT | Performed by: AUDIOLOGIST

## 2022-05-06 PROCEDURE — 69420 INCISION OF EARDRUM: CPT | Performed by: NURSE PRACTITIONER

## 2022-05-06 RX ORDER — CIPROFLOXACIN HYDROCHLORIDE 3.5 MG/ML
4 SOLUTION/ DROPS TOPICAL 2 TIMES DAILY
Qty: 1 EACH | Refills: 1 | Status: SHIPPED | OUTPATIENT
Start: 2022-05-06 | End: 2022-05-13

## 2022-05-06 ASSESSMENT — ENCOUNTER SYMPTOMS
SINUS PRESSURE: 0
SINUS PAIN: 0
RHINORRHEA: 0

## 2022-05-06 NOTE — PROGRESS NOTES
[id number] [facility] Otolaryngology  Dr. Trena Jiménez. HIMANSHU Watson Ms.Ed. New Consult       Patient Name:  Tammi De La Rosa  :  1990     CHIEF C/O:    Chief Complaint   Patient presents with    New Patient     Hx of mastoidectomy, bloody discharge, minimal hearing loss, pressure,        HISTORY OBTAINED FROM:  patient    HISTORY OF PRESENT ILLNESS:       Antoni Yin is a 27y.o. year old female, here today for left ear discharge with worsened hearing loss. Symptoms for 1 week  Started after flying 5 days ago  Got clogged and popped, noticed drainage from left ear  Has hx of left mastoidectomy and cholesteatoma, surgery in , Dr. Nhi Pinto at [facility]  Does complain of soreness in the ear currently  Denies pain or pressure  Ringing in the left, high frequency, non-pulsatile  No recent fevers or antibiotics  Hx of recurrent recurrent ear infections with several sets of tubes  No recent imaging on the ear. Denies any episodic dizziness. Past Medical History:   Diagnosis Date    Abnormal Pap smear of cervix     will repeat after delivery    Gestational diabetes mellitus (GDM) affecting pregnancy, antepartum 2020     Past Surgical History:   Procedure Laterality Date    DILATION AND CURETTAGE  2019    X 2    MASTOIDECTOMY      TYMPANOSTOMY TUBE PLACEMENT         Current Outpatient Medications:     ciprofloxacin (CILOXAN) 0.3 % ophthalmic solution, Place 4 drops in ear(s) 2 times daily for 7 days, Disp: 1 each, Rfl: 1    sertraline (ZOLOFT) 25 MG tablet, Take 1 tablet by mouth daily, Disp: 90 tablet, Rfl: 3    norethindrone-ethinyl estradiol-iron (LOESTRIN FE 1.5/30) 1.5-30 MG-MCG tablet, Take 1 tablet by mouth daily Take 1 active tablet daily, skip placebo pills.  (Patient not taking: Reported on 2022), Disp: 3 packet, Rfl: 3    ibuprofen (ADVIL;MOTRIN) 800 MG tablet, Take 1 tablet by mouth every 8 hours as needed for Pain (Patient not taking: Reported on 2021), Disp: 18 tablet, Rfl: 0   ferrous sulfate (IRON 325) 325 (65 Fe) MG tablet, Take 1 tablet by mouth daily (with breakfast) (Patient not taking: Reported on 2021), Disp: 30 tablet, Rfl: 2    aspirin 81 MG chewable tablet, Take 81 mg by mouth daily (Patient not taking: Reported on 2022), Disp: , Rfl:     Prenatal Vit-Fe Fumarate-FA (PRENATAL PO), Take by mouth (Patient not taking: Reported on 2022), Disp: , Rfl:   Patient has no known allergies. Social History     Tobacco Use    Smoking status: Former Smoker     Packs/day: 0.20     Types: Cigarettes     Quit date:      Years since quittin.3    Smokeless tobacco: Never Used   Vaping Use    Vaping Use: Never used   Substance Use Topics    Alcohol use: No    Drug use: No     Family History   Problem Relation Age of Onset    Depression Maternal Grandmother     Cancer Maternal Grandmother 48        breast    Depression Mother     Cancer Mother         melanoma    Depression Maternal Aunt        Review of Systems   HENT: Positive for ear pain (Pressure left ear), hearing loss and tinnitus. Negative for congestion, postnasal drip, rhinorrhea, sinus pressure and sinus pain. Neurological: Negative for dizziness. /81   Pulse 90   Ht 5' 4\" (1.626 m)   Wt 190 lb (86.2 kg)   BMI 32.61 kg/m²   Physical Exam  Constitutional:       Appearance: Normal appearance. HENT:      Head: Normocephalic. Right Ear: Tympanic membrane, ear canal and external ear normal.      Left Ear: External ear normal. A middle ear effusion is present. There is hemotympanum. Ears:        Nose: Nose normal. No rhinorrhea. Right Turbinates: Not swollen or pale. Left Turbinates: Not swollen or pale. Mouth/Throat:      Lips: Pink. Mouth: Mucous membranes are moist.      Pharynx: Oropharynx is clear. Eyes:      Conjunctiva/sclera: Conjunctivae normal.      Pupils: Pupils are equal, round, and reactive to light.    Cardiovascular:      Rate and Rhythm: Normal rate and regular rhythm. Pulses: Normal pulses. Pulmonary:      Effort: Pulmonary effort is normal. No respiratory distress. Breath sounds: No stridor. Musculoskeletal:         General: Normal range of motion. Cervical back: Normal range of motion. No rigidity. No muscular tenderness. Skin:     General: Skin is warm and dry. Neurological:      General: No focal deficit present. Mental Status: She is alert and oriented to person, place, and time. Psychiatric:         Mood and Affect: Mood normal.         Behavior: Behavior normal.         Thought Content: Thought content normal.         Judgment: Judgment normal.        Left TM:            Audiogram and tympanogram reviewed with patient. Audiogram reveals 10 dB hearing loss in the right ear with 100% discrimination at 50 dB, 40 dB of hearing loss in the left ear with 100% discrimination at 80 dB. Audiogram is asymmetrical on left. IMPRESSION/PLAN:    Op Note    Pre op diagnosis: Cholesteatoma, middle ear effusion, hemotympanum    Post Op: Same    Procedure: Left diagnostic myringotomy    Anesthesia: Topical phenol    Surgeon: Bertha Montes    Procedure Note: Procedure was explained to the patient with written consent obtained. Patient is placed in a reclined position within the exam chair with speculum placed into the left ear canal.  The left TM is anesthetized with phenol in the inferior posterior quadrant where a large collection of blood is present. A myringotomy knife is used to make a small incision into the left TM with moderate amount of fluid and blood drained from the ear canal.  Patient does state relief of pressure and mild improvement of her hearing following the procedure. Patient tolerated well. Complications: none    Blood Loss: Min. Disposition: home    April Fontana was seen today for new patient.     Diagnoses and all orders for this visit:    Cholesteatoma of left ear  -     CT IAC POSTERIOR FOSSA WO CONTRAST; Future  -     Ambulatory referral to ENT    Fluid level behind tympanic membrane of left ear  -     OK INCISION EARDRUM,ASPIR    Conductive hearing loss of left ear with unrestricted hearing of right ear    Other orders  -     ciprofloxacin (CILOXAN) 0.3 % ophthalmic solution; Place 4 drops in ear(s) 2 times daily for 7 days    Patient is seen and examined today for complaint of left ear pressure and drainage with a history of mastoidectomy and cholesteatoma removal.  The left TM is bulging with signs of hemotympanum and middle ear effusion. Audiogram is performed on the patient showing significant air-bone gap in the left ear with conductive hearing this is discussed with the patient who elects for a diagnostic myringotomy to be performed in the office today. Myringotomy was performed with relief provided to the patient of the ear fullness with decreased muffling of her left ear. She is given a prescription for ciprofloxacin drops, 4 drops for the left ear twice daily for 7 days for any new drainage from the ear. CT of the IAC posterior fossa without contrast is ordered for further evaluation of the left cholesteatoma. She will follow-up following completion of her CT scan with Dr. Jaime Ruff for further evaluation. She is instructed to call with any new or worsening symptoms prior to her next appointment.       Eboni Jose, MSN, FNP-C  8 [facility], Nose and Throat    The information contained in this note has been dictated using drug and medical speech recognition software and may contain errors

## 2022-05-06 NOTE — PROGRESS NOTES
This patient was referred for audiometric testing by [facility] due to hearing loss. The patient has a history of cholesteatoma in the left ear. Audiometry using pure tone air and bone conduction testing revealed hearing sensitivity within normal limits in the right ear, and a mild-to-moderate  conductive hearing loss, in the left ear. Reliability was good. Speech reception thresholds were in good agreement with the pure tone averages, bilaterally. Speech discrimination scores were excellent, bilaterally. The results were reviewed with the patient. Recommendations for follow up will be made pending physician consult.     Oleksandr Anthony

## 2022-05-17 ENCOUNTER — HOSPITAL ENCOUNTER (OUTPATIENT)
Dept: CT IMAGING | Age: 32
Discharge: HOME | End: 2022-05-17
Payer: COMMERCIAL

## 2022-05-17 DIAGNOSIS — H71.92 CHOLESTEATOMA OF LEFT EAR: ICD-10-CM

## 2022-05-17 PROCEDURE — 70480 CT ORBIT/EAR/FOSSA W/O DYE: CPT

## 2023-05-18 ENCOUNTER — TELEPHONE (OUTPATIENT)
Dept: OBGYN | Age: 33
End: 2023-05-18

## 2023-05-18 NOTE — TELEPHONE ENCOUNTER
Attempted to contact patient to schedule an appointment for an annual per provider.   Voicemail message was left to return call

## 2025-07-07 NOTE — PATIENT INSTRUCTIONS
Discharged per DR Griselda Solomons  Patient Education        Weeks 30 to 28 of Your Pregnancy: Care Instructions  Your Care Instructions     You have made it to the final months of your pregnancy. By now, your baby is really starting to look like a baby, with hair and plump skin. As you enter the final weeks of pregnancy, the reality of having a baby may start to set in. This is the time to settle on a name, get your household in order, set up a safe nursery, and find quality  if needed. Doing these things in advance will allow you to focus on caring for and enjoying your new baby. You may also want to have a tour of your hospital's labor and delivery unit to get a better idea of what to expect while you are in the hospital.  During these last months, it is very important to take good care of yourself and pay attention to what your body needs. If your doctor says it is okay for you to work, don't push yourself too hard. Use the tips provided in this care sheet to ease heartburn and care for varicose veins. If you haven't already had the Tdap shot during this pregnancy, talk to your doctor about getting it. It will help protect your  against pertussis infection. Follow-up care is a key part of your treatment and safety. Be sure to make and go to all appointments, and call your doctor if you are having problems. It's also a good idea to know your test results and keep a list of the medicines you take. How can you care for yourself at home? Pay attention to your baby's movements  · You should feel your baby move several times every day. · Your baby now turns less, and kicks and jabs more. · Your baby sleeps 20 to 45 minutes at a time and is more active at certain times of day. · If your doctor wants you to count your baby's kicks:  ? Empty your bladder, and lie on your side or relax in a comfortable chair. ? Write down your start time. ? Pay attention only to your baby's movements.  Count any movement except hiccups. ? After you have counted 10 movements, write down your stop time. ? Write down how many minutes it took for your baby to move 10 times. ? If an hour goes by and you have not recorded 10 movements, have something to eat or drink and then count for another hour. If you do not record 10 movements in either hour, call your doctor. Ease heartburn  · Eat small, frequent meals. · Do not eat chocolate, peppermint, or very spicy foods. Avoid drinks with caffeine, such as coffee, tea, and sodas. · Avoid bending over or lying down after meals. · Talk a short walk after you eat. · If heartburn is a problem at night, do not eat for 2 hours before bedtime. · Take antacids like Mylanta, Maalox, Rolaids, or Tums. Do not take antacids that have sodium bicarbonate. Care for varicose veins  · Varicose veins are blood vessels that stretch out with the extra blood during pregnancy. Your legs may ache or throb. Most varicose veins will go away after the birth. · Avoid standing for long periods of time. Sit with your legs crossed at the ankles, not the knees. · Sit with your feet propped up. · Avoid tight clothing or stockings. Wear support hose. · Exercise regularly. Try walking for at least 30 minutes a day. Where can you learn more? Go to https://Plastic JunglepenellaMatisse Networks.healthImmusanT. org and sign in to your [employer] account. Enter U692 in the [facility] box to learn more about \"Weeks 30 to 32 of Your Pregnancy: Care Instructions. \"     If you do not have an account, please click on the \"Sign Up Now\" link. Current as of: February 11, 2020               Content Version: 12.6  © 0313-1171 [employer], [employer]. Care instructions adapted under license by [facility] [employer] [facility] ([facility]). If you have questions about a medical condition or this instruction, always ask your healthcare professional. [address]  any warranty or liability for your use of this information.          Patient Education Learning About When to Call Your Doctor During Pregnancy (After 20 Weeks)  Your Care Instructions  It's common to have concerns about what might be a problem during pregnancy. Although most pregnant women don't have any serious problems, it's important to know when to call your doctor if you have certain symptoms or signs of labor. These are general suggestions. Your doctor may give you some more information about when to call. When to call your doctor (after 20 weeks)  Call 911 anytime you think you may need emergency care. For example, call if:  · You have severe vaginal bleeding. · You have sudden, severe pain in your belly. · You passed out (lost consciousness). · You have a seizure. · You see or feel the umbilical cord. · You think you are about to deliver your baby and can't make it safely to the hospital.  Call your doctor now or seek immediate medical care if:  · You have vaginal bleeding. · You have belly pain. · You have a fever. · You have symptoms of preeclampsia, such as:  ? Sudden swelling of your face, hands, or feet. ? New vision problems (such as dimness, blurring, or seeing spots). ? A severe headache. · You have a sudden release of fluid from your vagina. (You think your water broke.)  · You think that you may be in labor. This means that you've had at least 6 contractions in an hour. · You notice that your baby has stopped moving or is moving much less than normal.  · You have symptoms of a urinary tract infection. These may include:  ? Pain or burning when you urinate. ? A frequent need to urinate without being able to pass much urine. ? Pain in the flank, which is just below the rib cage and above the waist on either side of the back. ? Blood in your urine. Watch closely for changes in your health, and be sure to contact your doctor if:  · You have vaginal discharge that smells bad. · You have skin changes, such as:  ? A rash. ? Itching.   ? Yellow color to your count the number of times you feel your baby move. Follow-up care is a key part of your treatment and safety. Be sure to make and go to all appointments, and call your doctor if you are having problems. It's also a good idea to know your test results and keep a list of the medicines you take. How do you count fetal kicks? · A common method of checking your baby's movement is to count the number of kicks or moves you feel in 1 hour. Ten movements (such as kicks, flutters, or rolls) in 1 hour are normal. Some doctors suggest that you count in the morning until you get to 10 movements. Then you can quit for that day and start again the next day. · Pick your baby's most active time of day to count. This may be any time from morning to evening. · If you do not feel 10 movements in an hour, your baby may be sleeping. Wait for the next hour and count again. When should you call for help? Call your doctor now or seek immediate medical care if:    · You noticed that your baby has stopped moving or is moving much less than normal.   Watch closely for changes in your health, and be sure to contact your doctor if you have any problems. Where can you learn more? Go to https://EthonovapeFamilyApp.Zapcoder. org and sign in to your [employer] account. Enter A914 in the [facility] box to learn more about \"Counting Your Baby's Kicks: Care Instructions. \"     If you do not have an account, please click on the \"Sign Up Now\" link. Current as of: February 11, 2020               Content Version: 12.6  © 4620-1283 [employer]. Care instructions adapted under license by [address] If you have questions about a medical condition or this instruction, always ask your healthcare professional. [place] [address] any warranty or liability for your use of this information. 07-Jul-2025